# Patient Record
Sex: FEMALE | Race: WHITE | NOT HISPANIC OR LATINO | Employment: OTHER | URBAN - METROPOLITAN AREA
[De-identification: names, ages, dates, MRNs, and addresses within clinical notes are randomized per-mention and may not be internally consistent; named-entity substitution may affect disease eponyms.]

---

## 2017-09-06 ENCOUNTER — TELEPHONE (OUTPATIENT)
Dept: INTERNAL MEDICINE | Facility: CLINIC | Age: 82
End: 2017-09-06

## 2017-12-05 ENCOUNTER — APPOINTMENT (OUTPATIENT)
Dept: GENERAL RADIOLOGY | Facility: HOSPITAL | Age: 82
End: 2017-12-05

## 2017-12-05 ENCOUNTER — HOSPITAL ENCOUNTER (OUTPATIENT)
Facility: HOSPITAL | Age: 82
Setting detail: OBSERVATION
LOS: 1 days | Discharge: NURSING, REHAB OR LONG-TERM CARE | End: 2017-12-12
Attending: FAMILY MEDICINE | Admitting: ORTHOPAEDIC SURGERY

## 2017-12-05 DIAGNOSIS — Z74.09 IMPAIRED MOBILITY AND ACTIVITIES OF DAILY LIVING: ICD-10-CM

## 2017-12-05 DIAGNOSIS — S82.841A CLOSED BIMALLEOLAR FRACTURE OF RIGHT ANKLE, INITIAL ENCOUNTER: Primary | ICD-10-CM

## 2017-12-05 DIAGNOSIS — Z74.09 IMPAIRED PHYSICAL MOBILITY: ICD-10-CM

## 2017-12-05 DIAGNOSIS — Z78.9 IMPAIRED MOBILITY AND ACTIVITIES OF DAILY LIVING: ICD-10-CM

## 2017-12-05 PROBLEM — S82.851A: Status: ACTIVE | Noted: 2017-12-05

## 2017-12-05 PROBLEM — S82.843A ANKLE FRACTURE, BIMALLEOLAR, CLOSED: Status: ACTIVE | Noted: 2017-12-05

## 2017-12-05 PROCEDURE — 99219 PR INITIAL OBSERVATION CARE/DAY 50 MINUTES: CPT | Performed by: ORTHOPAEDIC SURGERY

## 2017-12-05 PROCEDURE — 96376 TX/PRO/DX INJ SAME DRUG ADON: CPT

## 2017-12-05 PROCEDURE — 99284 EMERGENCY DEPT VISIT MOD MDM: CPT

## 2017-12-05 PROCEDURE — 25010000002 FENTANYL CITRATE (PF) 100 MCG/2ML SOLUTION: Performed by: FAMILY MEDICINE

## 2017-12-05 PROCEDURE — 73610 X-RAY EXAM OF ANKLE: CPT

## 2017-12-05 PROCEDURE — 96374 THER/PROPH/DIAG INJ IV PUSH: CPT

## 2017-12-05 PROCEDURE — G0378 HOSPITAL OBSERVATION PER HR: HCPCS

## 2017-12-05 PROCEDURE — 25010000002 FENTANYL CITRATE (PF) 100 MCG/2ML SOLUTION

## 2017-12-05 RX ORDER — FENTANYL CITRATE 50 UG/ML
INJECTION, SOLUTION INTRAMUSCULAR; INTRAVENOUS
Status: DISCONTINUED
Start: 2017-12-05 | End: 2017-12-12 | Stop reason: HOSPADM

## 2017-12-05 RX ORDER — FENTANYL CITRATE 50 UG/ML
INJECTION, SOLUTION INTRAMUSCULAR; INTRAVENOUS
Status: DISCONTINUED
Start: 2017-12-05 | End: 2017-12-05 | Stop reason: WASHOUT

## 2017-12-05 RX ORDER — FENTANYL CITRATE 50 UG/ML
50 INJECTION, SOLUTION INTRAMUSCULAR; INTRAVENOUS ONCE
Status: COMPLETED | OUTPATIENT
Start: 2017-12-05 | End: 2017-12-05

## 2017-12-05 RX ORDER — FENTANYL CITRATE 50 UG/ML
INJECTION, SOLUTION INTRAMUSCULAR; INTRAVENOUS
Status: COMPLETED
Start: 2017-12-05 | End: 2017-12-05

## 2017-12-05 RX ORDER — SODIUM CHLORIDE 0.9 % (FLUSH) 0.9 %
10 SYRINGE (ML) INJECTION AS NEEDED
Status: DISCONTINUED | OUTPATIENT
Start: 2017-12-05 | End: 2017-12-12 | Stop reason: HOSPADM

## 2017-12-05 RX ADMIN — FENTANYL CITRATE 50 MCG: 50 INJECTION, SOLUTION INTRAMUSCULAR; INTRAVENOUS at 21:42

## 2017-12-05 RX ADMIN — FENTANYL CITRATE 50 MCG: 50 INJECTION, SOLUTION INTRAMUSCULAR; INTRAVENOUS at 23:12

## 2017-12-05 RX ADMIN — FENTANYL CITRATE 50 MCG: 50 INJECTION, SOLUTION INTRAMUSCULAR; INTRAVENOUS at 21:10

## 2017-12-05 RX ADMIN — Medication 10 ML: at 20:50

## 2017-12-06 ENCOUNTER — ANESTHESIA EVENT (OUTPATIENT)
Dept: PERIOP | Facility: HOSPITAL | Age: 82
End: 2017-12-06

## 2017-12-06 LAB — GLUCOSE BLDC GLUCOMTR-MCNC: 128 MG/DL (ref 70–130)

## 2017-12-06 PROCEDURE — G0378 HOSPITAL OBSERVATION PER HR: HCPCS

## 2017-12-06 PROCEDURE — 96376 TX/PRO/DX INJ SAME DRUG ADON: CPT

## 2017-12-06 PROCEDURE — 82962 GLUCOSE BLOOD TEST: CPT

## 2017-12-06 PROCEDURE — 25010000002 MORPHINE PER 10 MG: Performed by: ORTHOPAEDIC SURGERY

## 2017-12-06 PROCEDURE — 96375 TX/PRO/DX INJ NEW DRUG ADDON: CPT

## 2017-12-06 RX ORDER — SODIUM CHLORIDE AND POTASSIUM CHLORIDE 150; 900 MG/100ML; MG/100ML
75 INJECTION, SOLUTION INTRAVENOUS CONTINUOUS
Status: DISCONTINUED | OUTPATIENT
Start: 2017-12-07 | End: 2017-12-11

## 2017-12-06 RX ORDER — PROMETHAZINE HYDROCHLORIDE 25 MG/ML
12.5 INJECTION, SOLUTION INTRAMUSCULAR; INTRAVENOUS EVERY 6 HOURS PRN
Status: DISCONTINUED | OUTPATIENT
Start: 2017-12-06 | End: 2017-12-12 | Stop reason: HOSPADM

## 2017-12-06 RX ORDER — MORPHINE SULFATE 2 MG/ML
4 INJECTION, SOLUTION INTRAMUSCULAR; INTRAVENOUS EVERY 4 HOURS PRN
Status: DISCONTINUED | OUTPATIENT
Start: 2017-12-06 | End: 2017-12-06 | Stop reason: CLARIF

## 2017-12-06 RX ORDER — MORPHINE SULFATE 8 MG/ML
4 INJECTION INTRAMUSCULAR; INTRAVENOUS; SUBCUTANEOUS EVERY 4 HOURS PRN
Status: DISCONTINUED | OUTPATIENT
Start: 2017-12-06 | End: 2017-12-12 | Stop reason: HOSPADM

## 2017-12-06 RX ORDER — ZOLPIDEM TARTRATE 5 MG/1
10 TABLET ORAL NIGHTLY PRN
Status: DISCONTINUED | OUTPATIENT
Start: 2017-12-06 | End: 2017-12-12 | Stop reason: HOSPADM

## 2017-12-06 RX ADMIN — MORPHINE SULFATE 4 MG: 8 INJECTION, SOLUTION INTRAMUSCULAR; INTRAVENOUS at 04:55

## 2017-12-06 RX ADMIN — MORPHINE SULFATE 4 MG: 8 INJECTION, SOLUTION INTRAMUSCULAR; INTRAVENOUS at 10:44

## 2017-12-06 RX ADMIN — MORPHINE SULFATE 4 MG: 8 INJECTION, SOLUTION INTRAMUSCULAR; INTRAVENOUS at 21:23

## 2017-12-06 NOTE — PROGRESS NOTES
Pain controlled, eating lunch  Vitals:    12/06/17 1138   BP: 100/54   Pulse: 80   Resp: 18   Temp: 98 °F (36.7 °C)   SpO2: 94%     Alert  Splint in place  Moving toes well  Digits warm to touch  Bedrest  Surgery for ankle fracture soon.

## 2017-12-06 NOTE — H&P
Patient Care Team:  No Known Provider as PCP - General    Chief complaint   Chief Complaint   Patient presents with   • Ankle Injury   • Ankle Pain   • Fall       Subjective    Ankle Pain: Patient complains of right ankle pain.  Onset of the symptoms was today. Inciting event: injured while walk. Current symptoms include inability to bear weight and pain at the medial, lateral, dorsal and plantar aspect of the ankle.  Aggravating symptoms: any weight bearing, standing and walking. Patient's course of pain: began worsening 4 hr ago. Patient has had no prior ankle problems. Previous visits for this problem: none.  Evaluation to date: plain films: fracture.  Treatment to date: none.      Ankle Injury      Ankle Pain      Fall         Review of Systems   Constitutional: Negative.    HENT: Negative.    Eyes: Negative.    Respiratory: Negative.    Cardiovascular: Negative.    Gastrointestinal: Negative.    Endocrine: Negative.    Genitourinary: Negative.    Musculoskeletal: Positive for gait problem and joint swelling.   Skin: Negative.    Allergic/Immunologic: Negative.    Hematological: Negative.    Psychiatric/Behavioral: Negative.         History reviewed. No pertinent past medical history.  Past Surgical History:   Procedure Laterality Date   • HYSTERECTOMY       History reviewed. No pertinent family history.  Social History   Substance Use Topics   • Smoking status: Never Smoker   • Smokeless tobacco: Never Used   • Alcohol use No       (Not in a hospital admission)  Allergies:  Review of patient's allergies indicates no known allergies.    History reviewed. No pertinent family history.  Objective      Vital Signs  Temp:  [98.1 °F (36.7 °C)] 98.1 °F (36.7 °C)  Heart Rate:  [70-95] 70  Resp:  [18] 18  BP: (118-159)/(63-85) 118/63    Physical Exam      General Appearance:    Cooperative, elderly, frail, thin   Head:    Normocephalic, without obvious abnormality, atraumatic   Eyes:            Lids and lashes  normal, conjunctivae and sclerae normal, no   icterus, no pallor, corneas clear, PERRLA   Ears:    Ears appear intact with no abnormalities noted   Throat:   No oral lesions, no thrush, oral mucosa moist   Neck:   No adenopathy, supple, trachea midline, no thyromegaly, no     carotid bruit, no JVD   Back:     No kyphosis present, no scoliosis present, no skin lesions,       erythema or scars, no tenderness to percussion or                   palpation,   range of motion normal   Lungs:     Clear to auscultation,respirations regular, even and                   unlabored    Heart:    Regular rhythm and normal rate, normal S1 and S2, no            murmur, no gallop, no rub, no click   Breast Exam:    Deferred   Abdomen:     Normal bowel sounds, no masses, no organomegaly, soft        non-tender, non-distended, no guarding, no rebound                 tenderness   Genitalia:    Deferred   Extremities:   Moves all extremities well, no edema, no cyanosis, no              Redness, pain with palpation of the right ankle, + associated swelling.     Pulses:   Pulses palpable and equal bilaterally   Skin:   No bleeding, bruising or rash   Lymph nodes:   No palpable adenopathy   Neurologic:   Cranial nerves 2 - 12 grossly intact, sensation intact, DTR        present and equal bilaterally     Results Review:  Lab Results (last 24 hours)     ** No results found for the last 24 hours. **        Imaging Results (last 24 hours)     Procedure Component Value Units Date/Time    XR Ankle 3+ View Right [[id number]] Collected:  12/05/17 2053     Updated:  12/05/17 2116    Narrative:       Right ankle three view on 12/5/2017    CLINICAL INDICATION: Pain after fall    COMPARISON: None    FINDINGS: Ankle dislocation is noted with posterior and lateral  dislocation of the talus in relation to the distal tibial  plafond. There is an acute, oblique, displaced distal fibula  metadiaphysis fracture with lateral and posterior displacement  and  lateral and posterior angulation of the distal fracture  fragment. There is an acute, transverse, displaced fracture  through the base of the medial malleolus with lateral and likely  posterior displacement of the distal fracture fragment. No  definite posterior malleolar fracture is noted however would  recommend reimaging after relocation. Plantar calcaneal spur is  noted. No other fracture is noted.      Impression:       At least bimalleolar ankle fracture/dislocation as  above. Recommend reimaging after relocation.    Electronically signed by:  Anthony Cobos  12/5/2017 9:15 PM  CST Workstation: [id number]    XR Ankle 3+ View Right [[id number]] Collected:  12/05/17 2219     Updated:  12/05/17 2239    Narrative:       Right ankle three view on 12/5/2017    CLINICAL INDICATION: Post reduction    COMPARISON: 12/5/2017    FINDINGS: There remains posterior dislocation of the talus in  relation to the distal tibial plafond. Would still recommend  reimaging after successful relocation. Bimalleolar ankle  fractures are again noted with continued lateral and posterior  displacement of the distal fracture fragments.      Impression:       Unsuccessful reduction of the ankle dislocation with  continued posterior dislocation. Recommend reimaging after  relocation.    Electronically signed by:  Anthony Cobos  12/5/2017 10:38 PM  CST Workstation: [id number]      xray- dislocated trimalleolar ankle fracture.     I reviewed the patient's new clinical results.      Assessment/Plan     Principal Problem:    Ankle fracture, bimalleolar, closed      Assessment & Plan  Will need assistance before she can go home.  I discussed surgical treatment and reviewed options for treatment with her including risk of infection, non union, malunion, worsening pain, degenerative arthritic changes, talar avascular necrosis and revision surgery, possible amputation.  Medical risks of surgery include death, heart attack, arrhythmia,  heart failure, stroke, gastric ulceration perforation peritonitis deep vein thrombosis pulmonary embolus pneumonia pulmonary failure necessitating prolonged ventilation renal failure sepsis and multiorgan system failure.    All risks of surgery are discussed with her at length.    I discussed the patients findings and my recommendations with patient    aMrco A Ibrahim MD  12/05/17  11:01 PM    Time: More than 50% of time spent in counseling and coordination of care:  Total face-to-face/floor time 35 min.  Time spent in counseling 35 min. Counseling included the following topics: surgery

## 2017-12-06 NOTE — PLAN OF CARE
Problem: Patient Care Overview (Adult)  Goal: Plan of Care Review  Outcome: Ongoing (interventions implemented as appropriate)  Goal: Adult Individualization and Mutuality  Outcome: Ongoing (interventions implemented as appropriate)  Goal: Discharge Needs Assessment  Outcome: Ongoing (interventions implemented as appropriate)    Problem: Orthopaedic Fracture (Adult)  Goal: Signs and Symptoms of Listed Potential Problems Will be Absent or Manageable (Orthopaedic Fracture)  Outcome: Ongoing (interventions implemented as appropriate)

## 2017-12-06 NOTE — PLAN OF CARE
Problem: Patient Care Overview (Adult)  Goal: Plan of Care Review  Outcome: Ongoing (interventions implemented as appropriate)    12/06/17 0515   Coping/Psychosocial Response Interventions   Plan Of Care Reviewed With patient   Patient Care Overview   Progress no change       Goal: Adult Individualization and Mutuality  Outcome: Ongoing (interventions implemented as appropriate)  Goal: Discharge Needs Assessment  Outcome: Ongoing (interventions implemented as appropriate)    Problem: Orthopaedic Fracture (Adult)  Goal: Signs and Symptoms of Listed Potential Problems Will be Absent or Manageable (Orthopaedic Fracture)  Outcome: Ongoing (interventions implemented as appropriate)

## 2017-12-06 NOTE — ED PROVIDER NOTES
Subjective   Patient is a 82 y.o. female presenting with lower extremity pain.   Lower Extremity Issue   Location:  Ankle  Time since incident:  2 hours  Injury: yes    Mechanism of injury: fall    Fall:     Fall occurred:  Walking and tripped    Impact surface:  Dirt    Point of impact:  Unable to specify    Entrapped after fall: no    Ankle location:  R ankle  Pain details:     Quality:  Aching    Radiates to:  Does not radiate    Severity:  Severe    Onset quality:  Sudden    Timing:  Constant    Progression:  Unchanged  Chronicity:  New  Dislocation: yes    Foreign body present:  No foreign bodies  Prior injury to area:  Unable to specify  Relieved by:  Nothing  Worsened by:  Bearing weight  Ineffective treatments:  None tried  Associated symptoms: decreased ROM, numbness, swelling and tingling    Associated symptoms: no back pain, no fatigue, no fever, no itching, no muscle weakness, no neck pain and no stiffness    Risk factors: no concern for non-accidental trauma and no obesity        Review of Systems   Constitutional: Negative for appetite change, chills, diaphoresis, fatigue and fever.   HENT: Negative for congestion, ear discharge, ear pain, nosebleeds, rhinorrhea, sinus pressure, sore throat and trouble swallowing.    Eyes: Negative for discharge and redness.   Respiratory: Negative for apnea, cough, chest tightness, shortness of breath and wheezing.    Cardiovascular: Negative for chest pain.   Gastrointestinal: Negative for abdominal pain, diarrhea, nausea and vomiting.   Endocrine: Negative for polyuria.   Genitourinary: Negative for dysuria, frequency and urgency.   Musculoskeletal: Negative for back pain, myalgias, neck pain and stiffness.   Skin: Negative for color change, itching and rash.   Allergic/Immunologic: Negative for immunocompromised state.   Neurological: Negative for dizziness, seizures, syncope, weakness, light-headedness and headaches.   Hematological: Negative for adenopathy. Does  not bruise/bleed easily.   Psychiatric/Behavioral: Negative for behavioral problems and confusion.   All other systems reviewed and are negative.      History reviewed. No pertinent past medical history.    No Known Allergies    Past Surgical History:   Procedure Laterality Date   • HYSTERECTOMY         History reviewed. No pertinent family history.    Social History     Social History   • Marital status:      Spouse name: N/A   • Number of children: N/A   • Years of education: N/A     Social History Main Topics   • Smoking status: Never Smoker   • Smokeless tobacco: Never Used   • Alcohol use No   • Drug use: No   • Sexual activity: Defer     Other Topics Concern   • None     Social History Narrative   • None           Objective   Physical Exam   Constitutional: She is oriented to person, place, and time. She appears well-developed and well-nourished.   HENT:   Head: Normocephalic and atraumatic.   Nose: Nose normal.   Mouth/Throat: Oropharynx is clear and moist.   Eyes: Conjunctivae and EOM are normal. Pupils are equal, round, and reactive to light. Right eye exhibits no discharge. Left eye exhibits no discharge. No scleral icterus.   Neck: Normal range of motion. Neck supple. No tracheal deviation present.   Cardiovascular: Normal rate, regular rhythm and normal heart sounds.    No murmur heard.  Pulmonary/Chest: Effort normal and breath sounds normal. No stridor. No respiratory distress. She has no wheezes. She has no rales.   Abdominal: Soft. Bowel sounds are normal. She exhibits no distension and no mass. There is no tenderness. There is no rebound and no guarding.   Musculoskeletal: She exhibits edema and tenderness.        Right ankle: She exhibits decreased range of motion, swelling and deformity. Tenderness. Lateral malleolus and medial malleolus tenderness found.   Neurological: She is alert and oriented to person, place, and time. Coordination normal.   Skin: Skin is warm and dry. No rash noted.  No erythema.   Psychiatric: She has a normal mood and affect. Her behavior is normal. Thought content normal.   Nursing note and vitals reviewed.      Splint - Cast - Strapping  Date/Time: 12/5/2017 11:47 PM  Performed by: PASQUALE STEWART  Authorized by: PASQUALE STEWART     Consent:     Consent obtained:  Verbal  Pre-procedure details:     Sensation:  Normal  Procedure details:     Laterality:  Right    Location:  Ankle    Ankle:  R ankle    Splint type:  Ankle stirrup and long leg    Supplies:  Ortho-Glass  Post-procedure details:     Pain:  Improved    Sensation:  Normal    Patient tolerance of procedure:  Tolerated well, no immediate complications             ED Course  ED Course          Labs Reviewed - No data to display    XR Ankle 3+ View Right   Final Result   Unsuccessful reduction of the ankle dislocation with   continued posterior dislocation. Recommend reimaging after   relocation.      Electronically signed by:  Anthony Cobos  12/5/2017 10:38 PM   CST Workstation: [id number]      XR Ankle 3+ View Right   Final Result   At least bimalleolar ankle fracture/dislocation as   above. Recommend reimaging after relocation.      Electronically signed by:  Anthony Cobos  12/5/2017 9:15 PM   CST Workstation: [id number]      XR Ankle 3+ View Right    (Results Pending)                 MDM    Final diagnoses:   Closed bimalleolar fracture of right ankle, initial encounter            Pasquale Stewart MD  12/05/17 [id number]

## 2017-12-07 ENCOUNTER — ANESTHESIA (OUTPATIENT)
Dept: PERIOP | Facility: HOSPITAL | Age: 82
End: 2017-12-07

## 2017-12-07 ENCOUNTER — APPOINTMENT (OUTPATIENT)
Dept: GENERAL RADIOLOGY | Facility: HOSPITAL | Age: 82
End: 2017-12-07

## 2017-12-07 PROCEDURE — 25010000002 MIDAZOLAM PER 1 MG: Performed by: NURSE ANESTHETIST, CERTIFIED REGISTERED

## 2017-12-07 PROCEDURE — 25010000002 DEXAMETHASONE PER 1 MG: Performed by: NURSE ANESTHETIST, CERTIFIED REGISTERED

## 2017-12-07 PROCEDURE — 96361 HYDRATE IV INFUSION ADD-ON: CPT

## 2017-12-07 PROCEDURE — 94799 UNLISTED PULMONARY SVC/PX: CPT

## 2017-12-07 PROCEDURE — 73610 X-RAY EXAM OF ANKLE: CPT | Performed by: ORTHOPAEDIC SURGERY

## 2017-12-07 PROCEDURE — C1713 ANCHOR/SCREW BN/BN,TIS/BN: HCPCS | Performed by: ORTHOPAEDIC SURGERY

## 2017-12-07 PROCEDURE — G0378 HOSPITAL OBSERVATION PER HR: HCPCS

## 2017-12-07 PROCEDURE — 25010000002 PROPOFOL 10 MG/ML EMULSION: Performed by: NURSE ANESTHETIST, CERTIFIED REGISTERED

## 2017-12-07 PROCEDURE — 96376 TX/PRO/DX INJ SAME DRUG ADON: CPT

## 2017-12-07 PROCEDURE — 25010000002 ONDANSETRON PER 1 MG: Performed by: NURSE ANESTHETIST, CERTIFIED REGISTERED

## 2017-12-07 PROCEDURE — 25810000003 SODIUM CHLORIDE 0.9 % WITH KCL 20 MEQ 20-0.9 MEQ/L-% SOLUTION: Performed by: ORTHOPAEDIC SURGERY

## 2017-12-07 PROCEDURE — 76000 FLUOROSCOPY <1 HR PHYS/QHP: CPT

## 2017-12-07 PROCEDURE — 25010000003 CEFAZOLIN PER 500 MG: Performed by: ORTHOPAEDIC SURGERY

## 2017-12-07 PROCEDURE — 25010000002 MORPHINE PER 10 MG: Performed by: ORTHOPAEDIC SURGERY

## 2017-12-07 PROCEDURE — 27823 TREATMENT OF ANKLE FRACTURE: CPT | Performed by: ORTHOPAEDIC SURGERY

## 2017-12-07 PROCEDURE — 25010000002 FENTANYL CITRATE (PF) 100 MCG/2ML SOLUTION: Performed by: NURSE ANESTHETIST, CERTIFIED REGISTERED

## 2017-12-07 DEVICE — SCRW CANN LNG THRD 1/2 4X50MM: Type: IMPLANTABLE DEVICE | Site: ANKLE | Status: FUNCTIONAL

## 2017-12-07 DEVICE — PLT TBG 1/3 LCP W COL 6HL 69MM: Type: IMPLANTABLE DEVICE | Site: ANKLE | Status: FUNCTIONAL

## 2017-12-07 DEVICE — IMPLANTABLE DEVICE: Type: IMPLANTABLE DEVICE | Site: ANKLE | Status: FUNCTIONAL

## 2017-12-07 DEVICE — SCRW CORT S/TAP 3.5X14MM: Type: IMPLANTABLE DEVICE | Site: ANKLE | Status: FUNCTIONAL

## 2017-12-07 RX ORDER — DEXAMETHASONE SODIUM PHOSPHATE 4 MG/ML
INJECTION, SOLUTION INTRA-ARTICULAR; INTRALESIONAL; INTRAMUSCULAR; INTRAVENOUS; SOFT TISSUE AS NEEDED
Status: DISCONTINUED | OUTPATIENT
Start: 2017-12-07 | End: 2017-12-07 | Stop reason: SURG

## 2017-12-07 RX ORDER — ACETAMINOPHEN 325 MG/1
650 TABLET ORAL ONCE AS NEEDED
Status: DISCONTINUED | OUTPATIENT
Start: 2017-12-07 | End: 2017-12-07 | Stop reason: HOSPADM

## 2017-12-07 RX ORDER — BACTERIOSTATIC SODIUM CHLORIDE 0.9 %
VIAL (ML) INJECTION AS NEEDED
Status: DISCONTINUED | OUTPATIENT
Start: 2017-12-07 | End: 2017-12-12 | Stop reason: HOSPADM

## 2017-12-07 RX ORDER — CEFAZOLIN SODIUM 1 G/3ML
INJECTION, POWDER, FOR SOLUTION INTRAMUSCULAR; INTRAVENOUS AS NEEDED
Status: DISCONTINUED | OUTPATIENT
Start: 2017-12-07 | End: 2017-12-07

## 2017-12-07 RX ORDER — SODIUM CHLORIDE, SODIUM GLUCONATE, SODIUM ACETATE, POTASSIUM CHLORIDE AND MAGNESIUM CHLORIDE 526; 502; 368; 37; 30 MG/100ML; MG/100ML; MG/100ML; MG/100ML; MG/100ML
INJECTION, SOLUTION INTRAVENOUS CONTINUOUS
Status: DISCONTINUED | OUTPATIENT
Start: 2017-12-07 | End: 2017-12-11

## 2017-12-07 RX ORDER — SODIUM CHLORIDE, SODIUM GLUCONATE, SODIUM ACETATE, POTASSIUM CHLORIDE, AND MAGNESIUM CHLORIDE 526; 502; 368; 37; 30 MG/100ML; MG/100ML; MG/100ML; MG/100ML; MG/100ML
INJECTION, SOLUTION INTRAVENOUS CONTINUOUS PRN
Status: DISCONTINUED | OUTPATIENT
Start: 2017-12-07 | End: 2017-12-07 | Stop reason: SURG

## 2017-12-07 RX ORDER — MORPHINE SULFATE 8 MG/ML
4 INJECTION INTRAMUSCULAR; INTRAVENOUS; SUBCUTANEOUS EVERY 4 HOURS PRN
Status: DISCONTINUED | OUTPATIENT
Start: 2017-12-07 | End: 2017-12-12 | Stop reason: HOSPADM

## 2017-12-07 RX ORDER — DOCUSATE SODIUM 100 MG/1
100 CAPSULE, LIQUID FILLED ORAL 2 TIMES DAILY
Status: DISCONTINUED | OUTPATIENT
Start: 2017-12-07 | End: 2017-12-12 | Stop reason: HOSPADM

## 2017-12-07 RX ORDER — NALOXONE HCL 0.4 MG/ML
0.2 VIAL (ML) INJECTION AS NEEDED
Status: DISCONTINUED | OUTPATIENT
Start: 2017-12-07 | End: 2017-12-07 | Stop reason: HOSPADM

## 2017-12-07 RX ORDER — SODIUM CHLORIDE, SODIUM GLUCONATE, SODIUM ACETATE, POTASSIUM CHLORIDE AND MAGNESIUM CHLORIDE 526; 502; 368; 37; 30 MG/100ML; MG/100ML; MG/100ML; MG/100ML; MG/100ML
1000 INJECTION, SOLUTION INTRAVENOUS CONTINUOUS
Status: DISCONTINUED | OUTPATIENT
Start: 2017-12-07 | End: 2017-12-11

## 2017-12-07 RX ORDER — MIDAZOLAM HYDROCHLORIDE 1 MG/ML
INJECTION INTRAMUSCULAR; INTRAVENOUS AS NEEDED
Status: DISCONTINUED | OUTPATIENT
Start: 2017-12-07 | End: 2017-12-07 | Stop reason: SURG

## 2017-12-07 RX ORDER — BACITRACIN 50000 [IU]/1
INJECTION, POWDER, FOR SOLUTION INTRAMUSCULAR AS NEEDED
Status: DISCONTINUED | OUTPATIENT
Start: 2017-12-07 | End: 2017-12-12 | Stop reason: HOSPADM

## 2017-12-07 RX ORDER — HYDROMORPHONE HCL 110MG/55ML
0.5 PATIENT CONTROLLED ANALGESIA SYRINGE INTRAVENOUS
Status: DISCONTINUED | OUTPATIENT
Start: 2017-12-07 | End: 2017-12-07 | Stop reason: HOSPADM

## 2017-12-07 RX ORDER — ACETAMINOPHEN 500 MG
500 TABLET ORAL EVERY 6 HOURS PRN
Status: DISCONTINUED | OUTPATIENT
Start: 2017-12-07 | End: 2017-12-12 | Stop reason: HOSPADM

## 2017-12-07 RX ORDER — SODIUM CHLORIDE AND POTASSIUM CHLORIDE 150; 900 MG/100ML; MG/100ML
100 INJECTION, SOLUTION INTRAVENOUS CONTINUOUS
Status: DISCONTINUED | OUTPATIENT
Start: 2017-12-07 | End: 2017-12-11

## 2017-12-07 RX ORDER — PROPOFOL 10 MG/ML
VIAL (ML) INTRAVENOUS AS NEEDED
Status: DISCONTINUED | OUTPATIENT
Start: 2017-12-07 | End: 2017-12-07 | Stop reason: SURG

## 2017-12-07 RX ORDER — ACETAMINOPHEN 650 MG/1
650 SUPPOSITORY RECTAL ONCE AS NEEDED
Status: DISCONTINUED | OUTPATIENT
Start: 2017-12-07 | End: 2017-12-07 | Stop reason: HOSPADM

## 2017-12-07 RX ORDER — FENTANYL CITRATE 50 UG/ML
INJECTION, SOLUTION INTRAMUSCULAR; INTRAVENOUS AS NEEDED
Status: DISCONTINUED | OUTPATIENT
Start: 2017-12-07 | End: 2017-12-07 | Stop reason: SURG

## 2017-12-07 RX ORDER — DIPHENHYDRAMINE HYDROCHLORIDE 50 MG/ML
12.5 INJECTION INTRAMUSCULAR; INTRAVENOUS
Status: DISCONTINUED | OUTPATIENT
Start: 2017-12-07 | End: 2017-12-07 | Stop reason: HOSPADM

## 2017-12-07 RX ORDER — ONDANSETRON 2 MG/ML
4 INJECTION INTRAMUSCULAR; INTRAVENOUS ONCE AS NEEDED
Status: DISCONTINUED | OUTPATIENT
Start: 2017-12-07 | End: 2017-12-07 | Stop reason: HOSPADM

## 2017-12-07 RX ORDER — EPHEDRINE SULFATE 50 MG/ML
5 INJECTION, SOLUTION INTRAVENOUS ONCE AS NEEDED
Status: DISCONTINUED | OUTPATIENT
Start: 2017-12-07 | End: 2017-12-07 | Stop reason: HOSPADM

## 2017-12-07 RX ORDER — LABETALOL HYDROCHLORIDE 5 MG/ML
5 INJECTION, SOLUTION INTRAVENOUS
Status: DISCONTINUED | OUTPATIENT
Start: 2017-12-07 | End: 2017-12-07 | Stop reason: HOSPADM

## 2017-12-07 RX ORDER — FLUMAZENIL 0.1 MG/ML
0.2 INJECTION INTRAVENOUS AS NEEDED
Status: DISCONTINUED | OUTPATIENT
Start: 2017-12-07 | End: 2017-12-07 | Stop reason: HOSPADM

## 2017-12-07 RX ORDER — ONDANSETRON 2 MG/ML
INJECTION INTRAMUSCULAR; INTRAVENOUS AS NEEDED
Status: DISCONTINUED | OUTPATIENT
Start: 2017-12-07 | End: 2017-12-07 | Stop reason: SURG

## 2017-12-07 RX ORDER — LIDOCAINE HYDROCHLORIDE 20 MG/ML
INJECTION, SOLUTION INFILTRATION; PERINEURAL AS NEEDED
Status: DISCONTINUED | OUTPATIENT
Start: 2017-12-07 | End: 2017-12-07 | Stop reason: SURG

## 2017-12-07 RX ORDER — PROMETHAZINE HYDROCHLORIDE 25 MG/ML
12.5 INJECTION, SOLUTION INTRAMUSCULAR; INTRAVENOUS EVERY 6 HOURS PRN
Status: DISCONTINUED | OUTPATIENT
Start: 2017-12-07 | End: 2017-12-12 | Stop reason: HOSPADM

## 2017-12-07 RX ADMIN — ONDANSETRON 4 MG: 2 INJECTION INTRAMUSCULAR; INTRAVENOUS at 11:46

## 2017-12-07 RX ADMIN — POTASSIUM CHLORIDE AND SODIUM CHLORIDE 75 ML/HR: 900; 150 INJECTION, SOLUTION INTRAVENOUS at 07:09

## 2017-12-07 RX ADMIN — SODIUM CHLORIDE, SODIUM GLUCONATE, SODIUM ACETATE, POTASSIUM CHLORIDE AND MAGNESIUM CHLORIDE 250 ML: 526; 502; 368; 37; 30 INJECTION, SOLUTION INTRAVENOUS at 11:15

## 2017-12-07 RX ADMIN — SODIUM CHLORIDE, SODIUM GLUCONATE, SODIUM ACETATE, POTASSIUM CHLORIDE, AND MAGNESIUM CHLORIDE: 526; 502; 368; 37; 30 INJECTION, SOLUTION INTRAVENOUS at 11:39

## 2017-12-07 RX ADMIN — DEXAMETHASONE SODIUM PHOSPHATE 4 MG: 4 INJECTION, SOLUTION INTRAMUSCULAR; INTRAVENOUS at 10:49

## 2017-12-07 RX ADMIN — SODIUM CHLORIDE, SODIUM GLUCONATE, SODIUM ACETATE, POTASSIUM CHLORIDE AND MAGNESIUM CHLORIDE 100 ML: 526; 502; 368; 37; 30 INJECTION, SOLUTION INTRAVENOUS at 10:31

## 2017-12-07 RX ADMIN — SODIUM CHLORIDE, SODIUM GLUCONATE, SODIUM ACETATE, POTASSIUM CHLORIDE AND MAGNESIUM CHLORIDE 300 ML: 526; 502; 368; 37; 30 INJECTION, SOLUTION INTRAVENOUS at 10:55

## 2017-12-07 RX ADMIN — FENTANYL CITRATE 25 MCG: 50 INJECTION, SOLUTION INTRAMUSCULAR; INTRAVENOUS at 11:25

## 2017-12-07 RX ADMIN — PROPOFOL 70 MG: 10 INJECTION, EMULSION INTRAVENOUS at 10:37

## 2017-12-07 RX ADMIN — SODIUM CHLORIDE, SODIUM GLUCONATE, SODIUM ACETATE, POTASSIUM CHLORIDE AND MAGNESIUM CHLORIDE 1000 ML: 526; 502; 368; 37; 30 INJECTION, SOLUTION INTRAVENOUS at 09:58

## 2017-12-07 RX ADMIN — MORPHINE SULFATE 4 MG: 8 INJECTION, SOLUTION INTRAMUSCULAR; INTRAVENOUS at 05:44

## 2017-12-07 RX ADMIN — SODIUM CHLORIDE, SODIUM GLUCONATE, SODIUM ACETATE, POTASSIUM CHLORIDE AND MAGNESIUM CHLORIDE 300 ML: 526; 502; 368; 37; 30 INJECTION, SOLUTION INTRAVENOUS at 11:35

## 2017-12-07 RX ADMIN — POTASSIUM CHLORIDE AND SODIUM CHLORIDE 75 ML/HR: 900; 150 INJECTION, SOLUTION INTRAVENOUS at 01:18

## 2017-12-07 RX ADMIN — WATER 2 G: 1 INJECTION INTRAMUSCULAR; INTRAVENOUS; SUBCUTANEOUS at 19:36

## 2017-12-07 RX ADMIN — FENTANYL CITRATE 50 MCG: 50 INJECTION, SOLUTION INTRAMUSCULAR; INTRAVENOUS at 10:33

## 2017-12-07 RX ADMIN — MORPHINE SULFATE 4 MG: 8 INJECTION, SOLUTION INTRAMUSCULAR; INTRAVENOUS at 13:10

## 2017-12-07 RX ADMIN — MIDAZOLAM 1 MG: 1 INJECTION INTRAMUSCULAR; INTRAVENOUS at 10:33

## 2017-12-07 RX ADMIN — FENTANYL CITRATE 25 MCG: 50 INJECTION, SOLUTION INTRAMUSCULAR; INTRAVENOUS at 11:14

## 2017-12-07 RX ADMIN — FENTANYL CITRATE 25 MCG: 50 INJECTION, SOLUTION INTRAMUSCULAR; INTRAVENOUS at 10:49

## 2017-12-07 RX ADMIN — WATER 2 G: 1 INJECTION INTRAMUSCULAR; INTRAVENOUS; SUBCUTANEOUS at 10:45

## 2017-12-07 RX ADMIN — POTASSIUM CHLORIDE AND SODIUM CHLORIDE 100 ML/HR: 900; 150 INJECTION, SOLUTION INTRAVENOUS at 21:01

## 2017-12-07 RX ADMIN — ACETAMINOPHEN 500 MG: 500 TABLET ORAL at 14:37

## 2017-12-07 RX ADMIN — PROPOFOL 30 MG: 10 INJECTION, EMULSION INTRAVENOUS at 10:38

## 2017-12-07 RX ADMIN — DOCUSATE SODIUM 100 MG: 100 CAPSULE, LIQUID FILLED ORAL at 17:07

## 2017-12-07 RX ADMIN — LIDOCAINE HYDROCHLORIDE 60 MG: 20 INJECTION, SOLUTION INFILTRATION; PERINEURAL at 10:37

## 2017-12-07 RX ADMIN — SODIUM CHLORIDE, SODIUM GLUCONATE, SODIUM ACETATE, POTASSIUM CHLORIDE AND MAGNESIUM CHLORIDE 50 ML: 526; 502; 368; 37; 30 INJECTION, SOLUTION INTRAVENOUS at 11:37

## 2017-12-07 NOTE — BRIEF OP NOTE
ANKLE OPEN REDUCTION INTERNAL FIXATION  Progress Note    Rebecca Zaragoza  12/5/2017 - 12/7/2017    Pre-op Diagnosis:   Closed bimalleolar fracture of right ankle, initial encounter [S82.841A]       Post-Op Diagnosis Codes:     * Closed bimalleolar fracture of right ankle, initial encounter [S82.841A]    Procedure/CPT® Codes:  WI OPEN TX TRIMALLEOLAR ANKLE FX W FIX PST LIP [14677]  WI APPLY LOWER LEG SPLINT [42197]  CHG X-RAY ANKLE 3+ VW [28971]    Procedure(s):  OPEN REDUCTION INTERNAL FIXATION RIGHT ANKLE       (C-ARM#3)    Surgeon(s):  Marco A Ibrahim MD    Anesthesia: General    Staff:   Circulator: Nawaf Torres RN  Radiology Technologist: Hernando Almonte  Scrub Person: Paige Case  Assistant: Phylicia Barber CSA    Estimated Blood Loss: minimal    Urine Voided: * No values recorded between 12/7/2017 10:31 AM and 12/7/2017 11:58 AM *    Specimens:                None      Drains:           Findings: trimalleolar ankle fracture    Complications: none      Marco A Ibrahim MD     Date: 12/7/2017  Time: 12:09 PM

## 2017-12-07 NOTE — OP NOTE
Diagnosis right trimalleolar ankle fracture dislocation, unstable  Diagnosis right trimalleolar ankle fracture dislocation, unstable  Surgery  1) Open reduction internal fixation right unstable trimalleolar ankle fracture, with internal fixation  2) Placement of short leg splint right leg  3) Xray evaluation right ankle for open reduction internal fixation   Anesthesia general  Surgeon Shanika Kamara  Blood loss 20  ND is brought to the operating room and placed under general anesthetic.  She is positioned, the right thigh tourniquet is placed.  Consent and IV antibiotics are reviewed.  Right leg splint is removed.  The ankle is washed with chloroprep and sterile surgical barriers are placed.  Sterile surgical technique is performed for this procedure.  The tourniquet is inflated, I placed an incision of the right ankle, medial malleolus, 5cm.  I dissected upon the bone, the fracture of the medial malleolus is identified.  I irritated the fracture, irrigated the ankle joint and debrided periosteal tissue from the fracture site.  There was not a large medial malleolar piece, and there was some rubble with smaller fracture pieces.  A large cartilage flap was present on the medial talar dome and this was removed.  I reduced the medial malleolus as perfectly as could be achieved.  Two faith wires are placed.  I reamed and placed two cancellous screws, 50mm length, with good fixation.  I was able to observe the posterior malleolar fracture from the medial incision.  I placed a #2 vicryl through the posterior malleolar fracture piece, and sutured this to the tibial metaphysis and periosteum, providing some fixation of the posterior tibial piece.  The medial malleolus is now stable.  I irrigated the wound, closed with vicryl followed by nylon.  Incision of the lateral side is 8cm, I exposed the fibula, where the fracture is identified.  I irrigated the fracture site and removed the hematoma.  I reduced the  fracture, placed a 6hole plate, screws for fixation.  The ankle joint is well aligned, stable fixation through passive range of motion.  xrays of the ankle show the ankle is aligned, fixation in appropriate position.  All fixation is stable, ankle alignment is stable.  I irrigated laterally, closed with vicryl and nylon.  Sterile dressings are placed.  Cast padding is placed.  A short leg splint is placed.  The patient is taken to PACU.  No complications.

## 2017-12-07 NOTE — ANESTHESIA PROCEDURE NOTES
Airway  Urgency: elective    Airway not difficult    General Information and Staff    Patient location during procedure: OR  CRNA: FABRICE LEE    Indications and Patient Condition  Indications for airway management: airway protection    Preoxygenated: yes  Mask difficulty assessment: 0 - not attempted    Final Airway Details  Final airway type: supraglottic airway      Successful airway: classic  Size 4    Number of attempts at approach: 1

## 2017-12-07 NOTE — PLAN OF CARE
Problem: Patient Care Overview (Adult)  Goal: Plan of Care Review  Outcome: Ongoing (interventions implemented as appropriate)    12/07/17 1222   Coping/Psychosocial Response Interventions   Plan Of Care Reviewed With patient   Patient Care Overview   Progress improving   Outcome Evaluation   Outcome Summary/Follow up Plan vss, dc per anesthesia protocol, transfer to .       Goal: Discharge Needs Assessment  Outcome: Ongoing (interventions implemented as appropriate)

## 2017-12-07 NOTE — ANESTHESIA PREPROCEDURE EVALUATION
Anesthesia Evaluation     no history of anesthetic complications:  NPO Solid Status: > 8 hours  NPO Liquid Status: > 8 hours     Airway   Mallampati: II  TM distance: <3 FB  Neck ROM: limited  possible difficult intubation  Dental    (+) lower dentures and upper dentures    Pulmonary - normal exam    breath sounds clear to auscultation  (-) not a smoker    ROS comment: Sinus congestion  Cardiovascular   Exercise tolerance: good (4-7 METS)    Rhythm: regular  Rate: normal    (-) angina, murmur      Neuro/Psych  (+) TIA,    GI/Hepatic/Renal/Endo - negative ROS     Musculoskeletal     (+) back pain,       ROS comment: Right ankle fracture  Fell at home covering up her flowers for cold nights    Improved alignment of ankle dislocation with some  continued mild posterior subluxation/partial dislocation. There  has been improved alignment of apparent trimalleolar ankle  fracture  Abdominal  - normal exam   Substance History - negative use     OB/GYN          Other   (+) arthritis                                   Anesthesia Plan    ASA 3     general     intravenous induction   Anesthetic plan and risks discussed with patient.

## 2017-12-07 NOTE — PLAN OF CARE
Problem: Patient Care Overview (Adult)  Goal: Plan of Care Review  Outcome: Ongoing (interventions implemented as appropriate)    12/07/17 0425   Coping/Psychosocial Response Interventions   Plan Of Care Reviewed With patient   Patient Care Overview   Progress no change   Outcome Evaluation   Outcome Summary/Follow up Plan pt resting well throughout the night, states she only wants to take pain medication when absolutely necessary       Goal: Adult Individualization and Mutuality  Outcome: Ongoing (interventions implemented as appropriate)  Goal: Discharge Needs Assessment  Outcome: Ongoing (interventions implemented as appropriate)    Problem: Orthopaedic Fracture (Adult)  Goal: Signs and Symptoms of Listed Potential Problems Will be Absent or Manageable (Orthopaedic Fracture)  Outcome: Ongoing (interventions implemented as appropriate)

## 2017-12-07 NOTE — ANESTHESIA POSTPROCEDURE EVALUATION
Patient: Rebecca Zaragoza    Procedure Summary     Date Anesthesia Start Anesthesia Stop Room / Location    12/07/17 1031 1206 [facility] OR 11 / [facility]       Procedure Diagnosis Surgeon Provider    OPEN REDUCTION INTERNAL FIXATION RIGHT ANKLE       (C-ARM#3) (Right Ankle) Closed bimalleolar fracture of right ankle, initial encounter  (Closed bimalleolar fracture of right ankle, initial encounter [S82.651H]) MD Yair Jaeger MD          Anesthesia Type: general  Last vitals  BP   109/53 (12/07/17 0933)   Temp   (!) 100.8 °F (38.2 °C) (12/07/17 0933)   Pulse   73 (12/07/17 0933)   Resp   20 (12/07/17 0933)     SpO2   93 % (on ear, 89-91 on finger) (12/07/17 0933)     Post Anesthesia Care and Evaluation    Patient location during evaluation: PACU  Patient participation: complete - patient participated  Level of consciousness: sleepy but conscious  Pain management: adequate  Airway patency: patent  Anesthetic complications: No anesthetic complications  PONV Status: none  Cardiovascular status: acceptable  Respiratory status: acceptable  Hydration status: acceptable

## 2017-12-08 PROCEDURE — G0378 HOSPITAL OBSERVATION PER HR: HCPCS

## 2017-12-08 PROCEDURE — 25010000003 CEFAZOLIN PER 500 MG: Performed by: ORTHOPAEDIC SURGERY

## 2017-12-08 PROCEDURE — G8987 SELF CARE CURRENT STATUS: HCPCS

## 2017-12-08 PROCEDURE — 94760 N-INVAS EAR/PLS OXIMETRY 1: CPT

## 2017-12-08 PROCEDURE — 25810000003 SODIUM CHLORIDE 0.9 % WITH KCL 20 MEQ 20-0.9 MEQ/L-% SOLUTION: Performed by: ORTHOPAEDIC SURGERY

## 2017-12-08 PROCEDURE — G8978 MOBILITY CURRENT STATUS: HCPCS | Performed by: PHYSICAL THERAPIST

## 2017-12-08 PROCEDURE — 97116 GAIT TRAINING THERAPY: CPT

## 2017-12-08 PROCEDURE — G8979 MOBILITY GOAL STATUS: HCPCS | Performed by: PHYSICAL THERAPIST

## 2017-12-08 PROCEDURE — G8988 SELF CARE GOAL STATUS: HCPCS

## 2017-12-08 PROCEDURE — 94799 UNLISTED PULMONARY SVC/PX: CPT

## 2017-12-08 PROCEDURE — 97530 THERAPEUTIC ACTIVITIES: CPT | Performed by: PHYSICAL THERAPIST

## 2017-12-08 PROCEDURE — 97166 OT EVAL MOD COMPLEX 45 MIN: CPT

## 2017-12-08 PROCEDURE — 97530 THERAPEUTIC ACTIVITIES: CPT

## 2017-12-08 PROCEDURE — 25010000002 MORPHINE PER 10 MG: Performed by: ORTHOPAEDIC SURGERY

## 2017-12-08 PROCEDURE — 97162 PT EVAL MOD COMPLEX 30 MIN: CPT | Performed by: PHYSICAL THERAPIST

## 2017-12-08 RX ADMIN — Medication 10 ML: at 16:39

## 2017-12-08 RX ADMIN — MORPHINE SULFATE 4 MG: 8 INJECTION, SOLUTION INTRAMUSCULAR; INTRAVENOUS at 21:18

## 2017-12-08 RX ADMIN — ACETAMINOPHEN 500 MG: 500 TABLET ORAL at 01:13

## 2017-12-08 RX ADMIN — WATER 2 G: 1 INJECTION INTRAMUSCULAR; INTRAVENOUS; SUBCUTANEOUS at 05:36

## 2017-12-08 RX ADMIN — MORPHINE SULFATE 4 MG: 8 INJECTION, SOLUTION INTRAMUSCULAR; INTRAVENOUS at 16:39

## 2017-12-08 RX ADMIN — POTASSIUM CHLORIDE AND SODIUM CHLORIDE 100 ML/HR: 900; 150 INJECTION, SOLUTION INTRAVENOUS at 06:58

## 2017-12-08 RX ADMIN — DOCUSATE SODIUM 100 MG: 100 CAPSULE, LIQUID FILLED ORAL at 17:12

## 2017-12-08 RX ADMIN — MORPHINE SULFATE 4 MG: 8 INJECTION, SOLUTION INTRAMUSCULAR; INTRAVENOUS at 09:15

## 2017-12-08 RX ADMIN — DOCUSATE SODIUM 100 MG: 100 CAPSULE, LIQUID FILLED ORAL at 08:59

## 2017-12-08 NOTE — PROGRESS NOTES
Pain controlled  Vitals:    12/08/17 0413   BP: 109/59   Pulse: 67   Resp: 18   Temp: 98 °F (36.7 °C)   SpO2: 94%     Alert  Splint in place  Moving toes well  Brisk capillary refill  Sensation intact to touch  Physical therapy/OT  No weight bearing of the injured leg  -nursing facility transfer

## 2017-12-08 NOTE — PLAN OF CARE
Problem: Patient Care Overview (Adult)  Goal: Plan of Care Review  Outcome: Ongoing (interventions implemented as appropriate)    12/08/17 1514   Coping/Psychosocial Response Interventions   Plan Of Care Reviewed With patient   Patient Care Overview   Progress improving   Outcome Evaluation   Outcome Summary/Follow up Plan Pt t/f sup-sut with CGA, c/o increased pain upon EOB. sit-stand CGA. w/ RW. Pt ambulated ~ 68' with RW and CGA- MIn A . Constant cues needed and assistance given to manage AD. Pt having trouble with sequencing and maintaining NWB on RLE. Pt would benifit from continued skilled PT. SNF recommended at D/C for further rehab prior to discharge home. No goals met this pm.          Problem: Inpatient Physical Therapy  Goal: Bed Mobility Goal STG- PT  Outcome: Ongoing (interventions implemented as appropriate)    12/08/17 1027 12/08/17 1514   Bed Mobility PT STG   Bed Mobility PT STG, Time to Achieve 2 days --    Bed Mobility PT STG, Activity Type supine to sit/sit to supine --    Bed Mobility PT STG, [place] Level independent --    Bed Mobility PT STG, Additional Goal HOB down and no rails --    Bed Mobility PT STG, Date Goal Reviewed --  12/08/17   Bed Mobility PT STG, Outcome --  goal ongoing       Goal: Transfer Training Goal 1 STG- PT  Outcome: Ongoing (interventions implemented as appropriate)    12/08/17 1027 12/08/17 1514   Transfer Training PT STG   Transfer Training PT STG, Date Established 12/08/17 --    Transfer Training PT STG, Time to Achieve 2 days --    Transfer Training PT STG, Activity Type bed to chair /chair to bed;sit to stand/stand to sit --    Transfer Training PT STG, [place] Level supervision required --    Transfer Training PT STG, Assist Device walker, rolling --    Transfer Training PT STG, Additional Goal maintain NWB status --    Transfer Training PT STG, Date Goal Reviewed --  12/08/17   Transfer Training PT STG, Outcome --  goal ongoing       Goal: Gait Training  Goal LTG- PT  Outcome: Ongoing (interventions implemented as appropriate)    12/08/17 1027 12/08/17 1514   Gait Training PT LTG   Gait Training Goal PT LTG, Date Established 12/08/17 --    Gait Training Goal PT LTG, Time to Achieve 2 days --    Gait Training Goal PT LTG, [place] Level contact guard assist --    Gait Training Goal PT LTG, Assist Device walker, rolling --    Gait Training Goal PT LTG, Distance to Achieve 50 --    Gait Training Goal PT LTG, Additional Goal NWB RLE --    Gait Training Goal PT LTG, Date Goal Reviewed --  12/08/17   Gait Training Goal PT LTG, Outcome --  goal ongoing       Goal: Stair Training Goal LTG- PT  Outcome: Ongoing (interventions implemented as appropriate)    12/08/17 1027 12/08/17 1514   Stair Training PT LTG   Stair Training Goal PT LTG, Date Established 12/08/17 --    Stair Training Goal PT LTG, Time to Achieve by discharge --    Stair Training Goal PT LTG, Number of Steps 3 --    Stair Training Goal PT LTG, [place] Level minimum assist (75% patient effort) --    Stair Training Goal PT LTG, Assist Device 1 handrail --    Stair Training Goal PT LTG, Date Goal Reviewed --  12/08/17   Stair Training Goal PT LTG, Outcome --  goal ongoing

## 2017-12-08 NOTE — PLAN OF CARE
Problem: Patient Care Overview (Adult)  Goal: Plan of Care Review  Outcome: Ongoing (interventions implemented as appropriate)    12/08/17 1849   Coping/Psychosocial Response Interventions   Plan Of Care Reviewed With patient   Patient Care Overview   Progress improving   Outcome Evaluation   Outcome Summary/Follow up Plan VSS; patient working with therapy, requiring assistance with ADL's; pain controlled with medication;       Goal: Adult Individualization and Mutuality  Outcome: Ongoing (interventions implemented as appropriate)  Goal: Discharge Needs Assessment  Outcome: Ongoing (interventions implemented as appropriate)    Problem: Orthopaedic Fracture (Adult)  Goal: Signs and Symptoms of Listed Potential Problems Will be Absent or Manageable (Orthopaedic Fracture)  Outcome: Ongoing (interventions implemented as appropriate)

## 2017-12-08 NOTE — PLAN OF CARE
Problem: Patient Care Overview (Adult)  Goal: Plan of Care Review  Outcome: Ongoing (interventions implemented as appropriate)    12/08/17 1151   Coping/Psychosocial Response Interventions   Plan Of Care Reviewed With patient   Outcome Evaluation   Outcome Summary/Follow up Plan OT eval complete on this date. Pt required CGA for sit to stand and min A for transfers and short amb. Pt could benefit from OT services to increase independence with ADLs and functional mobility/transfers.       Goal: Discharge Needs Assessment    12/08/17 1151   Discharge Needs Assessment   Equipment Needed After Discharge grab bar;wheelchair;walker, rolling;raised toilet   Discharge Facility/Level Of Care Needs acute rehab;nursing facility, skilled         Problem: Inpatient Occupational Therapy  Goal: Transfer Training Goal 1 LTG- OT  Outcome: Ongoing (interventions implemented as appropriate)    12/08/17 1151   Transfer Training OT LTG   Transfer Training OT LTG, Date Established 12/08/17   Transfer Training OT LTG, Time to Achieve by discharge   Transfer Training OT LTG, Activity Type all transfers   Transfer Training OT LTG, [place] Level supervision required;contact guard assist   Transfer Training OT LTG, Assist Device walker, rolling       Goal: Static Standing Balance Goal LTG- OT  Outcome: Ongoing (interventions implemented as appropriate)    12/08/17 1151   Static Standing Balance OT LTG   Static Standing Balance OT LTG, Date Established 12/08/17   Static Standing Balance OT LTG, Time to Achieve by discharge   Static Standing Balance OT LTG, [place] Level supervision required  (5 minutes with functional activity.)   Static Standing Balance OT LTG, Assist Device assistive device  (R/W.)       Goal: Patient Education Goal LTG- OT  Outcome: Ongoing (interventions implemented as appropriate)    12/08/17 1151   Patient Education OT LTG   Patient Education OT LTG, Date Established 12/08/17   Patient Education OT LTG, Time to  Achieve by discharge   Patient Education OT LTG, Education Type HEP;home safety;adaptive equipment mgmt;energy conservation;work simplification   Patient Education OT LTG, Education Understanding demonstrates adequately;verbalizes understanding       Goal: ADL Goal LTG- OT  Outcome: Ongoing (interventions implemented as appropriate)    12/08/17 1151   ADL OT LTG   ADL OT LTG, Date Established 12/08/17   ADL OT LTG, Time to Achieve by discharge   ADL OT LTG, Activity Type ADL skills  (Sponge bath and dress or walk-in shower.)   ADL OT LTG, [place] Level standby assist;contact guard  (R/W.)

## 2017-12-08 NOTE — THERAPY TREATMENT NOTE
Acute Care - Physical Therapy Treatment Note  [facility]     Patient Name: Rebecca Zaragoza  : 1935  MRN: [id number]  Today's Date: 2017  Onset of Illness/Injury or Date of Surgery Date: 17  Date of Referral to PT: 17  Referring Physician: Dr Ibrahim    Admit Date: 2017    Visit Dx:    ICD-10-CM ICD-9-CM   1. Closed bimalleolar fracture of right ankle, initial encounter S82.841A 824.4   2. Impaired mobility and activities of daily living Z74.09 799.89   3. Impaired physical mobility Z74.09 781.99     Patient Active Problem List   Diagnosis   • Ankle fracture, bimalleolar, closed   • Fracture of ankle, trimalleolar, closed, right, initial encounter   • Closed bimalleolar fracture of right ankle               Adult Rehabilitation Note       17 1430          Rehab Assessment/Intervention    Discipline physical therapy assistant  -SS      Document Type therapy note (daily note)  -SS      Subjective Information agree to therapy  -SS      Patient Effort, Rehab Treatment good  -SS      Symptoms Noted During/After Treatment none  -SS      Precautions/Limitations non-weight bearing status;fall precautions   NWB LLE  -SS      Recorded by [SS] Nikki Gonzales PTA      Vital Signs    Pre Systolic BP Rehab 110  -SS      Pre Treatment Diastolic BP 56  -SS      Post Systolic BP Rehab 119  -SS      Post Treatment Diastolic BP 62  -SS      Pretreatment Heart Rate (beats/min) 65  -SS      Posttreatment Heart Rate (beats/min) 76  -SS      Pre SpO2 (%) 98  -SS      O2 Delivery Pre Treatment room air  -SS      Post SpO2 (%) 98  -SS      O2 Delivery Post Treatment room air  -SS      Pre Patient Position Supine  -SS      Intra Patient Position Standing  -SS      Post Patient Position Supine  -SS      Recorded by [SS] Nikki Gonzales PTA      Pain Assessment    Pain Assessment 0-10  -SS      Pain Score 5  -SS      Post Pain Score 5  -SS      Pain Location Ankle  -SS      Pain Orientation  Right  -SS      Pain Intervention(s) Ambulation/increased activity;Repositioned  -SS      Recorded by [SS] Nikki Gonzales PTA      Cognitive Assessment/Intervention    Current Cognitive/Communication Assessment functional  -      Orientation Status oriented x 4  -SS      Follows Commands/Answers Questions 100% of the time  -      Personal Safety WNL/WFL  -      Personal Safety Interventions fall prevention program maintained;gait belt;nonskid shoes/slippers when out of bed  -      Recorded by [SS] Nikki Gonzales PTA      Bed Mobility, Assessment/Treatment    Bed Mobility, Assistive Device bed rails;head of bed elevated  -      Bed Mob, Supine to Sit, [place] supervision required  -      Bed Mob, Sit to Supine, [place] contact guard assist  -      Recorded by [SS] Nikki Gonzales PTA      Transfer Assessment/Treatment    Transfers, Sit-Stand [place] contact guard assist  -SS      Transfers, Stand-Sit [place] contact guard assist  -SS      Transfers, Sit-Stand-Sit, Assist Device rolling walker  -      Transfer, Maintain Weight Bearing Status unable to maintain weight bearing status  -      Transfer, Safety Issues sequencing ability decreased  -SS      Transfer, Impairments impaired balance;unable to maintain weight bearing status;pain  -SS      Recorded by [SS] Nikki Gonzales PTA      Gait Assessment/Treatment    Gait, [place] Level contact guard assist  -      Gait, Assistive Device rolling walker  -      Gait, Distance (Feet) 68  -      Gait, Gait Deviations phyllis decreased;decreased heel strike  -      Gait, Maintain Weight Bearing Status unable to maintain weight bearing status  -      Gait, Safety Issues sequencing ability decreased;steps too close front assistive device  -      Gait, Impairments unable to maintain weight bearing status;impaired balance  -      Gait, Comment Pt requires constant cues for sequencing and to keep weight off of  RLE.   -SS      Recorded by [SS] Nikki Gonzales PTA      Positioning and Restraints    Pre-Treatment Position in bed  -SS      Post Treatment Position bed  -SS      In Bed supine;call light within reach;encouraged to call for assist;exit alarm on;RLE elevated  -SS      Recorded by [SS] Nikki Gonzales PTA        User Key  (r) = Recorded By, (t) = Taken By, (c) = Cosigned By    Initials Name Effective Dates     Nikki Gonzales PTA 10/17/16 -                 IP PT Goals       12/08/17 1514 12/08/17 1027       Bed Mobility PT STG    Bed Mobility PT STG, Time to Achieve  2 days  -CB     Bed Mobility PT STG, Activity Type  supine to sit/sit to supine  -CB     Bed Mobility PT STG, [place] Level  independent  -CB     Bed Mobility PT STG, Additional Goal  HOB down and no rails  -CB     Bed Mobility PT STG, Date Goal Reviewed 12/08/17  -SS      Bed Mobility PT STG, Outcome goal ongoing  -SS      Transfer Training PT STG    Transfer Training PT STG, Date Established  12/08/17  -CB     Transfer Training PT STG, Time to Achieve  2 days  -CB     Transfer Training PT STG, Activity Type  bed to chair /chair to bed;sit to stand/stand to sit  -CB     Transfer Training PT STG, [place] Level  supervision required  -CB     Transfer Training PT STG, Assist Device  walker, rolling  -CB     Transfer Training PT STG, Additional Goal  maintain NWB status  -CB     Transfer Training PT STG, Date Goal Reviewed 12/08/17  -SS      Transfer Training PT STG, Outcome goal ongoing  -SS      Gait Training PT LTG    Gait Training Goal PT LTG, Date Established  12/08/17  -CB     Gait Training Goal PT LTG, Time to Achieve  2 days  -CB     Gait Training Goal PT LTG, [place] Level  contact guard assist  -CB     Gait Training Goal PT LTG, Assist Device  walker, rolling  -CB     Gait Training Goal PT LTG, Distance to Achieve  50  -CB     Gait Training Goal PT LTG, Additional Goal  NWB RLE  -CB     Gait Training Goal PT LTG, Date  Goal Reviewed 12/08/17  -SS      Gait Training Goal PT LTG, Outcome goal ongoing  -SS      Stair Training PT LTG    Stair Training Goal PT LTG, Date Established  12/08/17  -CB     Stair Training Goal PT LTG, Time to Achieve  by discharge  -CB     Stair Training Goal PT LTG, Number of Steps  3  -CB     Stair Training Goal PT LTG, [place] Level  minimum assist (75% patient effort)  -CB     Stair Training Goal PT LTG, Assist Device  1 handrail  -CB     Stair Training Goal PT LTG, Date Goal Reviewed 12/08/17  -SS      Stair Training Goal PT LTG, Outcome goal ongoing  -        User Key  (r) = Recorded By, (t) = Taken By, (c) = Cosigned By    Initials Name Provider Type    CB Lisha Robledo, PT Physical Therapist    SS Nikki Gonzales, PTA Physical Therapy Assistant          Physical Therapy Education     Title: PT OT SLP Therapies (Active)     Topic: Physical Therapy (Active)     Point: Mobility training (Active)    Learning Progress Summary    Learner Readiness Method Response Comment Documented by Status   Patient Acceptance D NR   12/08/17 1410 Active               Point: Precautions (Active)    Learning Progress Summary    Learner Readiness Method Response Comment Documented by Status   Patient Acceptance D NR   12/08/17 1410 Active                      User Key     Initials Effective Dates Name Provider Type Discipline     04/06/17 -  Lisha Robledo, PT Physical Therapist PT                    PT Recommendation and Plan  Anticipated Equipment Needs At Discharge: front wheeled walker  Anticipated Discharge Disposition: skilled nursing facility (patient request)  Planned Therapy Interventions: bed mobility training, gait training, home exercise program, patient/family education, strengthening, transfer training, stair training  PT Frequency: per priority policy (5-14)  Plan of Care Review  Plan Of Care Reviewed With: patient  Progress: improving  Outcome Summary/Follow up Plan: Pt t/f sup-sut with  CGA, c/o increased pain upon EOB. sit-stand CGA. w/ RW. Pt ambulated ~ 68' with RW and CGA- MIn A . Constant cues needed and assistance given to manage AD. Pt having trouble with sequencing and maintaining NWB on RLE. Pt would benifit from continued skilled PT. SNF recommended at D/C for further rehab prior to discharge home. No goals met this pm.           Outcome Measures       12/08/17 1430 12/08/17 1027 12/08/17 0908    How much help from another person do you currently need...    Turning from your back to your side while in flat bed without using bedrails? 3  -SS 3  -CB     Moving from lying on back to sitting on the side of a flat bed without bedrails? 3  -SS 3  -CB     Moving to and from a bed to a chair (including a wheelchair)? 3  -SS 3  -CB     Standing up from a chair using your arms (e.g., wheelchair, bedside chair)? 3  -SS 3  -CB     Climbing 3-5 steps with a railing? 2  -SS 2  -CB     To walk in hospital room? 3  -SS 3  -CB     AM-PAC 6 Clicks Score 17  -SS 17  -CB     How much help from another is currently needed...    Putting on and taking off regular lower body clothing?   2  -RB    Bathing (including washing, rinsing, and drying)   2  -RB    Toileting (which includes using toilet bed pan or urinal)   2  -RB    Putting on and taking off regular upper body clothing   3  -RB    Taking care of personal grooming (such as brushing teeth)   4  -RB    Eating meals   4  -RB    Score   17  -RB    Functional Assessment    Outcome Measure Options AM-PAC 6 Clicks Basic Mobility (PT)  -SS AM-PAC 6 Clicks Basic Mobility (PT)  -CB AM-PAC 6 Clicks Daily Activity (OT)  -RB      User Key  (r) = Recorded By, (t) = Taken By, (c) = Cosigned By    Initials Name Provider Type    CB Lisha Robledo, PT Physical Therapist    RB Blu Joens, OT Occupational Therapist    SS Nikki Gonzales, PTA Physical Therapy Assistant           Time Calculation:         PT Charges       12/08/17 1513 12/08/17 1418       Time  Calculation    Start Time 1430  -SS 1027  -CB     Stop Time 1505  -SS 1059  -CB     Time Calculation (min) 35 min  -SS 32 min  -CB     PT Received On 12/08/17  -SS 12/08/17  -CB     PT Goal Re-Cert Due Date  12/21/17  -CB     Time Calculation- PT    Total Timed Code Minutes- PT 35 minute(s)  -SS 17 minute(s)  -CB       User Key  (r) = Recorded By, (t) = Taken By, (c) = Cosigned By    Initials Name Provider Type    CB Lisha Robledo, PT Physical Therapist    SS Nikki Gonzales PTA Physical Therapy Assistant          Therapy Charges for Today     Code Description Service Date Service Provider Modifiers Qty    55748623608 HC GAIT TRAINING EA 15 MIN 12/8/2017 Nikki Gonzales PTA GP 1    11669155644 HC PT THERAPEUTIC ACT EA 15 MIN 12/8/2017 Nikki Gonzales PTA GP 1          PT G-Codes  PT Professional Judgement Used?: Yes  Outcome Measure Options: AM-PAC 6 Clicks Basic Mobility (PT)  Score: 17  Functional Limitation: Mobility: Walking and moving around  Mobility: Walking and Moving Around Current Status (): At least 40 percent but less than 60 percent impaired, limited or restricted  Mobility: Walking and Moving Around Goal Status (): At least 20 percent but less than 40 percent impaired, limited or restricted    Nikki Gonzales PTA  12/8/2017

## 2017-12-08 NOTE — PLAN OF CARE
Problem: Patient Care Overview (Adult)  Goal: Plan of Care Review  Outcome: Ongoing (interventions implemented as appropriate)    12/08/17 1027   Coping/Psychosocial Response Interventions   Plan Of Care Reviewed With patient   Outcome Evaluation   Outcome Summary/Follow up Plan PT eval completed, was able to come to stand from recliner with rolling walker and CGA of fone but was putting PWB on RLE< was able to ambulate into bathroom and sit then stand with rolling walker CGA f of one and verbal cuew to be NWB but had difficulty, able to ambulate with w=rollng walker an addiditonal 50 feet but always putting some brandi thru RLE, could benefit from skilled PT to regain and return to highest level of function in bed mobility, trnafers, and gait, may benefit rom knee walker or sling for rolling walker       Goal: Discharge Needs Assessment  Outcome: Ongoing (interventions implemented as appropriate)    12/08/17 1027   Discharge Needs Assessment   Concerns To Be Addressed no discharge needs identified   Equipment Needed After Discharge walker, rolling  (may benefit from knee sling or knee walker)   Discharge Facility/Level Of Care Needs skilled transitional care   Current Discharge Risk physical impairment;lives alone   Discharge Disposition still a patient   Self-Care   Equipment Currently Used at Home none   Living Environment   Transportation Available family or friend will provide   Current Health   Outpatient/Agency/Support Group Needs skilled nursing facility (specify)         Problem: Inpatient Physical Therapy  Goal: Bed Mobility Goal STG- PT  Outcome: Ongoing (interventions implemented as appropriate)    12/08/17 1027   Bed Mobility PT STG   Bed Mobility PT STG, Time to Achieve 2 days   Bed Mobility PT STG, Activity Type supine to sit/sit to supine   Bed Mobility PT STG, [place] Level independent   Bed Mobility PT STG, Additional Goal HOB down and no rails       Goal: Transfer Training Goal 1 STG-  PT  Outcome: Ongoing (interventions implemented as appropriate)    12/08/17 1027   Transfer Training PT STG   Transfer Training PT STG, Date Established 12/08/17   Transfer Training PT STG, Time to Achieve 2 days   Transfer Training PT STG, Activity Type bed to chair /chair to bed;sit to stand/stand to sit   Transfer Training PT STG, [place] Level supervision required   Transfer Training PT STG, Assist Device walker, rolling   Transfer Training PT STG, Additional Goal maintain NWB status       Goal: Gait Training Goal LTG- PT  Outcome: Ongoing (interventions implemented as appropriate)    12/08/17 1027   Gait Training PT LTG   Gait Training Goal PT LTG, Date Established 12/08/17   Gait Training Goal PT LTG, Time to Achieve 2 days   Gait Training Goal PT LTG, [place] Level contact guard assist   Gait Training Goal PT LTG, Assist Device walker, rolling   Gait Training Goal PT LTG, Distance to Achieve 50   Gait Training Goal PT LTG, Additional Goal NWB RLE       Goal: Stair Training Goal LTG- PT  Outcome: Ongoing (interventions implemented as appropriate)    12/08/17 1027   Stair Training PT LTG   Stair Training Goal PT LTG, Date Established 12/08/17   Stair Training Goal PT LTG, Time to Achieve by discharge   Stair Training Goal PT LTG, Number of Steps 3   Stair Training Goal PT LTG, [place] Level minimum assist (75% patient effort)   Stair Training Goal PT LTG, Assist Device 1 handrail

## 2017-12-08 NOTE — THERAPY EVALUATION
Acute Care - Physical Therapy Initial Evaluation  [facility]     Patient Name: Rebecca Zaragoza  : 1935  MRN: [id number]  Today's Date: 2017   Onset of Illness/Injury or Date of Surgery Date: 17  Date of Referral to PT: 17  Referring Physician: Dr Ibrahim      Admit Date: 2017     Visit Dx:    ICD-10-CM ICD-9-CM   1. Closed bimalleolar fracture of right ankle, initial encounter S82.841A 824.4   2. Impaired mobility and activities of daily living Z74.09 799.89   3. Impaired physical mobility Z74.09 781.99     Patient Active Problem List   Diagnosis   • Ankle fracture, bimalleolar, closed   • Fracture of ankle, trimalleolar, closed, right, initial encounter   • Closed bimalleolar fracture of right ankle     Past Medical History:   Diagnosis Date   • Osteopetrosis      Past Surgical History:   Procedure Laterality Date   • HYSTERECTOMY            PT ASSESSMENT (last 72 hours)      PT Evaluation       17 1240 17 1027    Rehab Evaluation    Document Type  evaluation  -CB    Subjective Information  agree to therapy  -CB    Patient Effort, Rehab Treatment  good  -CB    Symptoms Noted During/After Treatment  none  -CB    General Information    Patient Profile Review  yes  -CB    Onset of Illness/Injury or Date of Surgery Date  17  -CB    Referring Physician  Dr Ibrahim  -CB    General Observations  in recliner with IV  -CB    Pertinent History Of Current Problem  fall at home, son brought her to ER  -CB    Precautions/Limitations  fall precautions  -CB    Prior Level of Function  independent:;gait;ADL's  -CB    Equipment Currently Used at Home none  -ML none  -CB    Plans/Goals Discussed With  patient  -CB    Risks Reviewed  patient:  -CB    Benefits Reviewed  patient:;improve function;decrease risk of DVT  -CB    Barriers to Rehab  none identified  -CB    Living Environment    Lives With alone  -ML alone  -CB    Living Arrangements house  -ML house  -CB    Home  Accessibility stairs to enter home  -ML stairs within home  -CB    Number of Stairs to Enter Home  3  -CB    Stair Railings at Home none  -ML none  -CB    Type of Financial/Environmental Concern none  -ML none  -CB    Transportation Available family or friend will provide  -ML family or friend will provide  -CB    Clinical Impression    Date of Referral to PT  12/07/17  -CB    PT Diagnosis  impaired physical mobility due to bimalleolar frx R ankle s/p ORIF  -CB    Prognosis  good  -CB    Criteria for Skilled Therapeutic Interventions Met  treatment indicated;yes  -CB    Pathology/Pathophysiology Noted (Describe Specifically for Each System)  musculoskeletal  -CB    Impairments Found (describe specific impairments)  aerobic capacity/endurance;gait, locomotion, and balance  -CB    Functional Limitations in Following Categories (Describe Specific Limitations)  home management;self-care  -CB    Rehab Potential  good, to achieve stated therapy goals  -CB    Predicted Duration of Therapy Intervention (days/wks)  until goals met or discharged  -CB    Vital Signs    Pre Systolic BP Rehab  108  -CB    Pre Treatment Diastolic BP  53  -CB    Post Systolic BP Rehab  127  -CB    Post Treatment Diastolic BP  60  -CB    Pretreatment Heart Rate (beats/min)  65  -CB    Posttreatment Heart Rate (beats/min)  79  -CB    Pre SpO2 (%)  98  -CB    O2 Delivery Pre Treatment  room air  -CB    Post SpO2 (%)  97  -CB    O2 Delivery Post Treatment  room air  -CB    Pre Patient Position  Sitting  -CB    Intra Patient Position  Standing  -CB    Post Patient Position  Supine  -CB    Pain Assessment    Pain Assessment  0-10  -CB    Pain Score  5  -CB    Post Pain Score  5  -CB    Pain Location  Ankle  -CB    Pain Orientation  Right  -CB    Pain Intervention(s)  Ambulation/increased activity;Medication (See MAR)  -CB    Vision Assessment/Intervention    Visual Impairment  WFL with corrective lenses  -CB    Cognitive Assessment/Intervention     Current Cognitive/Communication Assessment  functional  -CB    Orientation Status  oriented x 4  -CB    Follows Commands/Answers Questions  100% of the time  -CB    Personal Safety  WNL/WFL  -CB    Personal Safety Interventions  fall prevention program maintained;gait belt;nonskid shoes/slippers when out of bed  -CB    ROM (Range of Motion)    General ROM  lower extremity range of motion deficits identified  -CB    General ROM Detail  LLE WFL, L hip and knee WFL, ankle not tested  -CB    MMT (Manual Muscle Testing)    General MMT Assessment  lower extremity strength deficits identified  -CB    General MMT Assessment Detail  LLE 4/5, RLE 4/5 hip and knee, ankle not tested  -CB    Mobility Assessment/Training    Extremity Weight-Bearing Status  right upper extremity  -CB    Right Lower Extremity Weight-Bearing  non weight-bearing  -CB    Bed Mobility, Assessment/Treatment    Bed Mobility, Assistive Device  bed rails;head of bed elevated  -CB    Bed Mob, Sit to Supine, [place]  supervision required  -CB    Transfer Assessment/Treatment    Transfers, Sit-Stand [place]  contact guard assist  -CB    Transfers, Stand-Sit [place]  contact guard assist  -CB    Transfers, Sit-Stand-Sit, Assist Device  rolling walker  -CB    Toilet Transfer, [place]  contact guard assist  -CB    Toilet Transfer, Assistive Device  rolling walker  -CB    Transfer, Maintain Weight Bearing Status  unable to maintain weight bearing status  -CB    Transfer, Safety Issues  sequencing ability decreased  -CB    Transfer, Impairments  impaired balance;unable to maintain weight bearing status  -CB    Gait Assessment/Treatment    Gait, [place] Level  contact guard assist  -CB    Gait, Assistive Device  rolling walker  -CB    Gait, Distance (Feet)  70  -CB    Gait, Gait Deviations  phyllis decreased;decreased heel strike  -CB    Gait, Maintain Weight Bearing Status  unable to maintain weight bearing status  -CB    Gait, Safety  Issues  sequencing ability decreased;step length decreased  -CB    Gait, Impairments  unable to maintain weight bearing status;impaired balance  -CB    Sensory Assessment/Intervention    Light Touch  LLE;RLE  -CB    LLE Light Touch  WNL  -CB    RLE Light Touch  WNL  -CB    Positioning and Restraints    Pre-Treatment Position  sitting in chair/recliner  -CB    Post Treatment Position  bed  -CB    In Bed  supine;call light within reach;encouraged to call for assist;exit alarm on;side rails up x2  -CB      12/08/17 0908 12/06/17 1650    Rehab Evaluation    Document Type evaluation  -RB     Subjective Information agree to therapy;complains of;pain  -RB     Patient Effort, Rehab Treatment good  -RB     General Information    Patient Profile Review yes  -RB     Onset of Illness/Injury or Date of Surgery Date 12/05/17  -RB     Referring Physician GEOFF Mcmahan MD.  -RB     General Observations Supine in recliner with IV.  -RB     Pertinent History Of Current Problem Pt fell @ home and sustained R bimalleolar closed fx  with ORIF preformed on 12/7/2017.  -RB     Precautions/Limitations fall precautions;non-weight bearing status;other (see comments)   NWB CHRISTIANNE GAY.  -RB     Prior Level of Function independent:;gait;transfer;ADL's;home management  -RB     Equipment Currently Used at Home none  -RB none  -MM    Plans/Goals Discussed With patient  -RB     Risks Reviewed patient:  -RB     Benefits Reviewed patient:  -RB     Barriers to Rehab none identified  -RB     Living Environment    Lives With alone  -RB     Living Arrangements house  -RB     Home Accessibility stairs to enter home  -RB     Number of Stairs to Enter Home 2  -RB     Stair Railings at Home none  -RB     Transportation Available family or friend will provide  -RB car  -MM    Vital Signs    Pre Systolic BP Rehab 111  -RB     Pre Treatment Diastolic BP 58  -RB     Post Systolic BP Rehab 123  -RB     Post Treatment Diastolic BP 77  -RB     Pretreatment Heart Rate  (beats/min) 75  -RB     Posttreatment Heart Rate (beats/min) 76  -RB     Pre SpO2 (%) 95  -RB     O2 Delivery Pre Treatment room air  -RB     Post SpO2 (%) 96  -RB     O2 Delivery Post Treatment room air  -RB     Pre Patient Position Supine  -RB     Intra Patient Position Standing  -RB     Post Patient Position Supine  -RB     Pain Assessment    Pain Assessment 0-10  -RB     Pain Score 5  -RB     Post Pain Score 5  -RB     Pain Type Surgical pain  -RB     Pain Location Ankle  -RB     Pain Orientation Right  -RB     Pain Intervention(s) Medication (See MAR)  -RB     Vision Assessment/Intervention    Visual Impairment WFL with corrective lenses  -RB     Cognitive Assessment/Intervention    Current Cognitive/Communication Assessment functional  -RB     Orientation Status oriented x 4  -RB     Follows Commands/Answers Questions 100% of the time  -RB     Personal Safety WNL/WFL  -RB     Personal Safety Interventions gait belt;nonskid shoes/slippers when out of bed;supervised activity  -RB     ROM (Range of Motion)    General ROM no range of motion deficits identified  -RB     General ROM Detail B UEs.  -RB     MMT (Manual Muscle Testing)    General MMT Assessment no strength deficits identified  -RB     General MMT Assessment Detail 5/5 B UEs.  -RB     Mobility Assessment/Training    Extremity Weight-Bearing Status right lower extremity  -RB     Right Lower Extremity Weight-Bearing non weight-bearing  -RB     Transfer Assessment/Treatment    Transfers, Sit-Stand [place] contact guard assist  -RB     Transfers, Stand-Sit [place] contact guard assist  -RB     Transfers, Sit-Stand-Sit, Assist Device rolling walker  -RB     Sensory Assessment/Intervention    Light Touch LUE;RUE  -RB     LUE Light Touch WNL  -RB     RUE Light Touch WNL  -RB     Positioning and Restraints    Pre-Treatment Position sitting in chair/recliner  -RB     Post Treatment Position chair  -RB     In Chair reclined;call light within reach  -RB        12/06/17 0212       General Information    Equipment Currently Used at Home none  -BG     Living Environment    Lives With alone  -BG     Living Arrangements house  -BG     Home Accessibility no concerns  -BG     Stair Railings at Home none  -BG     Type of Financial/Environmental Concern none  -BG     Transportation Available car  -BG       User Key  (r) = Recorded By, (t) = Taken By, (c) = Cosigned By    Initials Name Provider Type     Lisha Robledo, PT Physical Therapist    RB Blu Jones, OT Occupational Therapist    ML Kimi Jordan, KELLY Case Manager    BG Mata Bryan, RN Registered Nurse    KASEY Connor RN Registered Nurse          Physical Therapy Education     Title: PT OT SLP Therapies (Active)     Topic: Physical Therapy (Active)     Point: Mobility training (Active)    Learning Progress Summary    Learner Readiness Method Response Comment Documented by Status   Patient Acceptance D NR   12/08/17 1410 Active               Point: Precautions (Active)    Learning Progress Summary    Learner Readiness Method Response Comment Documented by Status   Patient Acceptance D NR   12/08/17 1410 Active                      User Key     Initials Effective Dates Name Provider Type Discipline     04/06/17 -  Lisha Robledo, PT Physical Therapist PT                PT Recommendation and Plan  Anticipated Equipment Needs At Discharge: front wheeled walker  Anticipated Discharge Disposition: skilled nursing facility (patient request)  Planned Therapy Interventions: bed mobility training, gait training, home exercise program, patient/family education, strengthening, transfer training, stair training  PT Frequency: per priority policy (5-14)  Plan of Care Review  Plan Of Care Reviewed With: patient  Outcome Summary/Follow up Plan: PT eval completed, was able to come to stand from recliner with rolling walker and CGA of fone but was putting PWB on RLE< was able to ambulate into bathroom and sit then   stand with rolling walker CGA f of one and verbal cuew to be NWB but had difficulty, able to ambulate with w=rollng walker an addiditonal 50 feet but always putting some brandi thru RLE, could benefit from skilled PT to regain and return to highest  level of function in bed mobility, trnafers, and gait, may benefit rom knee walker or sling for rolling walker          IP PT Goals       12/08/17 1027          Bed Mobility PT STG    Bed Mobility PT STG, Time to Achieve 2 days  -CB      Bed Mobility PT STG, Activity Type supine to sit/sit to supine  -CB      Bed Mobility PT STG, [place] Level independent  -CB      Bed Mobility PT STG, Additional Goal HOB down and no rails  -CB      Transfer Training PT STG    Transfer Training PT STG, Date Established 12/08/17  -CB      Transfer Training PT STG, Time to Achieve 2 days  -CB      Transfer Training PT STG, Activity Type bed to chair /chair to bed;sit to stand/stand to sit  -CB      Transfer Training PT STG, [place] Level supervision required  -CB      Transfer Training PT STG, Assist Device walker, rolling  -CB      Transfer Training PT STG, Additional Goal maintain NWB status  -CB      Gait Training PT LTG    Gait Training Goal PT LTG, Date Established 12/08/17  -CB      Gait Training Goal PT LTG, Time to Achieve 2 days  -CB      Gait Training Goal PT LTG, [place] Level contact guard assist  -CB      Gait Training Goal PT LTG, Assist Device walker, rolling  -CB      Gait Training Goal PT LTG, Distance to Achieve 50  -CB      Gait Training Goal PT LTG, Additional Goal NWB RLE  -CB      Stair Training PT LTG    Stair Training Goal PT LTG, Date Established 12/08/17  -CB      Stair Training Goal PT LTG, Time to Achieve by discharge  -CB      Stair Training Goal PT LTG, Number of Steps 3  -CB      Stair Training Goal PT LTG, [place] Level minimum assist (75% patient effort)  -CB      Stair Training Goal PT LTG, Assist Device 1 handrail  -CB        User Key   (r) = Recorded By, (t) = Taken By, (c) = Cosigned By    Initials Name Provider Type    DUSTY Robledo, PT Physical Therapist                Outcome Measures       12/08/17 1027 12/08/17 0908       How much help from another person do you currently need...    Turning from your back to your side while in flat bed without using bedrails? 3  -CB      Moving from lying on back to sitting on the side of a flat bed without bedrails? 3  -CB      Moving to and from a bed to a chair (including a wheelchair)? 3  -CB      Standing up from a chair using your arms (e.g., wheelchair, bedside chair)? 3  -CB      Climbing 3-5 steps with a railing? 2  -CB      To walk in hospital room? 3  -CB      AM-PAC 6 Clicks Score 17  -CB      How much help from another is currently needed...    Putting on and taking off regular lower body clothing?  2  -RB     Bathing (including washing, rinsing, and drying)  2  -RB     Toileting (which includes using toilet bed pan or urinal)  2  -RB     Putting on and taking off regular upper body clothing  3  -RB     Taking care of personal grooming (such as brushing teeth)  4  -RB     Eating meals  4  -RB     Score  17  -RB     Functional Assessment    Outcome Measure Options AM-PAC 6 Clicks Basic Mobility (PT)  -CB AM-PAC 6 Clicks Daily Activity (OT)  -RB       User Key  (r) = Recorded By, (t) = Taken By, (c) = Cosigned By    Initials Name Provider Type    DUSTY Robledo, PT Physical Therapist    RB Blu Jones, OT Occupational Therapist           Time Calculation:         PT Charges       12/08/17 1418          Time Calculation    Start Time 1027  -CB      Stop Time 1059  -CB      Time Calculation (min) 32 min  -CB      PT Received On 12/08/17  -CB      PT Goal Re-Cert Due Date 12/21/17  -CB      Time Calculation- PT    Total Timed Code Minutes- PT 17 minute(s)  -CB        User Key  (r) = Recorded By, (t) = Taken By, (c) = Cosigned By    Initials Name Provider Type    DUSTY Robledo, PT  Physical Therapist          Therapy Charges for Today     Code Description Service Date Service Provider Modifiers Qty    10804206921 HC PT MOBILITY CURRENT 12/8/2017 Lisha Robledo, PT GP, CK 1    32380021940 HC PT MOBILITY PROJECTED 12/8/2017 Lisha Robledo, PT GP, CJ 1    93637339607 HC PT EVAL MOD COMPLEXITY 1 12/8/2017 Lisha Robledo, PT GP 1    54849955711 HC PT THERAPEUTIC ACT EA 15 MIN 12/8/2017 Lisha Robledo, PT GP 1          PT G-Codes  PT Professional Judgement Used?: Yes  Outcome Measure Options: AM-PAC 6 Clicks Basic Mobility (PT)  Score: 17  Functional Limitation: Mobility: Walking and moving around  Mobility: Walking and Moving Around Current Status (): At least 40 percent but less than 60 percent impaired, limited or restricted  Mobility: Walking and Moving Around Goal Status (): At least 20 percent but less than 40 percent impaired, limited or restricted      Lisha Robledo, PT  12/8/2017

## 2017-12-08 NOTE — PLAN OF CARE
Problem: Patient Care Overview (Adult)  Goal: Plan of Care Review  Outcome: Ongoing (interventions implemented as appropriate)    12/08/17 0138   Coping/Psychosocial Response Interventions   Plan Of Care Reviewed With patient   Patient Care Overview   Progress no change   Outcome Evaluation   Outcome Summary/Follow up Plan VSS, working on pain control       Goal: Adult Individualization and Mutuality  Outcome: Ongoing (interventions implemented as appropriate)  Goal: Discharge Needs Assessment  Outcome: Ongoing (interventions implemented as appropriate)    Problem: Orthopaedic Fracture (Adult)  Goal: Signs and Symptoms of Listed Potential Problems Will be Absent or Manageable (Orthopaedic Fracture)  Outcome: Ongoing (interventions implemented as appropriate)

## 2017-12-08 NOTE — THERAPY EVALUATION
Acute Care - Occupational Therapy Initial Evaluation  [facility]     Patient Name: Rebecca Zaragoza  : 1935  MRN: [id number]  Today's Date: 2017  Onset of Illness/Injury or Date of Surgery Date: 17  Date of Referral to OT: 17  Referring Physician: GEOFF Mcmahan MD.    Admit Date: 2017       ICD-10-CM ICD-9-CM   1. Closed bimalleolar fracture of right ankle, initial encounter S82.841A 824.4   2. Impaired mobility and activities of daily living Z74.09 799.89     Patient Active Problem List   Diagnosis   • Ankle fracture, bimalleolar, closed   • Fracture of ankle, trimalleolar, closed, right, initial encounter   • Closed bimalleolar fracture of right ankle     Past Medical History:   Diagnosis Date   • Osteopetrosis      Past Surgical History:   Procedure Laterality Date   • HYSTERECTOMY            OT ASSESSMENT FLOWSHEET (last 72 hours)      OT Evaluation       17 0908 17 1650 17 0212          Rehab Evaluation    Document Type evaluation  -RB        Subjective Information agree to therapy;complains of;pain  -RB        Patient Effort, Rehab Treatment good  -RB        General Information    Patient Profile Review yes  -RB        Onset of Illness/Injury or Date of Surgery Date 17  -RB        Referring Physician GEOFF Mcmahan MD.  -RB        General Observations Supine in recliner with IV.  -RB        Pertinent History Of Current Problem Pt fell @ home and sustained R bimalleolar closed fx  with ORIF preformed on 2017.  -RB        Precautions/Limitations fall precautions;non-weight bearing status;other (see comments)   NWB R LE.  -RB        Prior Level of Function independent:;gait;transfer;ADL's;home management  -RB        Equipment Currently Used at Home none  -RB none  -MM none  -BG      Plans/Goals Discussed With patient  -RB        Risks Reviewed patient:  -RB        Benefits Reviewed patient:  -RB        Barriers to Rehab none identified  -RB         Living Environment    Lives With alone  -RB  alone  -BG      Living Arrangements house  -RB  house  -BG      Home Accessibility stairs to enter home  -RB  no concerns  -BG      Number of Stairs to Enter Home 2  -RB        Stair Railings at Home none  -RB  none  -BG      Type of Financial/Environmental Concern   none  -BG      Transportation Available family or friend will provide  -RB car  -MM car  -BG      Clinical Impression    Date of Referral to OT 12/08/17  -RB        OT Diagnosis Impaired mobility and ADLs.  -RB        Functional Level At Time Of Evaluation Impaired mobility and ADLs.  -RB        Impairments Found (describe specific impairments) gait, locomotion, and balance  -RB        Patient/Family Goals Statement States she wants to go to SNF for rehab.  -RB        Criteria for Skilled Therapeutic Interventions Met yes;treatment indicated  -RB        Rehab Potential good, to achieve stated therapy goals  -RB        Therapy Frequency --   3-14x/wk  -RB        Predicted Duration of Therapy Intervention (days/wks) Until D/C or goals met.  -RB        Anticipated Equipment Needs At Discharge wheelchair;two wheeled walker  -RB        Anticipated Discharge Disposition skilled nursing facility  -RB        Functional Level Prior    Ambulation 0-->independent  -RB  0-->independent  -BG      Transferring 0-->independent  -RB  0-->independent  -BG      Toileting 0-->independent  -RB  0-->independent  -BG      Bathing 0-->independent  -RB  0-->independent  -BG      Dressing 0-->independent  -RB  0-->independent  -BG      Eating 0-->independent  -RB  0-->independent  -BG      Communication 0-->understands/communicates without difficulty  -RB  0-->understands/communicates without difficulty  -BG      Swallowing 0-->swallows foods/liquids without difficulty  -RB  0-->swallows foods/liquids without difficulty  -BG      Prior Functional Level Comment   Independent  -BG      Vital Signs    Pre Systolic BP Rehab 111  -RB         Pre Treatment Diastolic BP 58  -RB        Post Systolic BP Rehab 123  -RB        Post Treatment Diastolic BP 77  -RB        Pretreatment Heart Rate (beats/min) 75  -RB        Posttreatment Heart Rate (beats/min) 76  -RB        Pre SpO2 (%) 95  -RB        O2 Delivery Pre Treatment room air  -RB        Post SpO2 (%) 96  -RB        O2 Delivery Post Treatment room air  -RB        Pre Patient Position Supine  -RB        Intra Patient Position Standing  -RB        Post Patient Position Supine  -RB        Pain Assessment    Pain Assessment 0-10  -RB        Pain Score 5  -RB        Post Pain Score 5  -RB        Pain Type Surgical pain  -RB        Pain Location Ankle  -RB        Pain Orientation Right  -RB        Pain Intervention(s) Medication (See MAR)  -RB        Vision Assessment/Intervention    Visual Impairment WFL with corrective lenses  -RB        Cognitive Assessment/Intervention    Current Cognitive/Communication Assessment functional  -RB        Orientation Status oriented x 4  -RB        Follows Commands/Answers Questions 100% of the time  -RB        Personal Safety WNL/WFL  -RB        Personal Safety Interventions gait belt;nonskid shoes/slippers when out of bed;supervised activity  -RB        ROM (Range of Motion)    General ROM no range of motion deficits identified  -RB        General ROM Detail B UEs.  -RB        MMT (Manual Muscle Testing)    General MMT Assessment no strength deficits identified  -RB        General MMT Assessment Detail 5/5 B UEs.  -RB        Mobility Assessment/Training    Extremity Weight-Bearing Status right lower extremity  -RB        Right Lower Extremity Weight-Bearing non weight-bearing  -RB        Transfer Assessment/Treatment    Transfers, Sit-Stand [place] contact guard assist  -RB        Transfers, Stand-Sit [place] contact guard assist  -RB        Transfers, Sit-Stand-Sit, Assist Device rolling walker  -RB        Functional Mobility    Functional Mobility- Ind.  Level minimum assist (75% patient effort)  -RB        Functional Mobility- Device rolling walker  -RB        Functional Mobility-Distance (Feet) 12  -RB        Functional Mobility-Maintain WBing assist to maintain weight bearing status  -RB        Functional Mobility- Safety Issues sequencing ability decreased;steps too close front assistive device  -RB        Sensory Assessment/Intervention    Light Touch LUE;RUE  -RB        LUE Light Touch WNL  -RB        RUE Light Touch WNL  -RB        General Therapy Interventions    Planned Therapy Interventions activity intolerance;ADL retraining;balance training;transfer training;strengthening;adaptive equipment training  -RB        Activity Intolerance good  -RB        Positioning and Restraints    Pre-Treatment Position sitting in chair/recliner  -RB        Post Treatment Position chair  -RB        In Chair reclined;call light within reach  -RB          User Key  (r) = Recorded By, (t) = Taken By, (c) = Cosigned By    Initials Name Effective Dates    RB Blu Jones OT 06/15/16 -     BG Mata Bryan RN 10/17/16 -     MM Aramis Connor RN 10/17/16 -            Occupational Therapy Education     Title: PT OT SLP Therapies (Active)     Topic: Occupational Therapy (Active)     Point: Precautions (Done)    Description: Instruct learner(s) on prescribed precautions during self-care and functional transfers.    Learning Progress Summary    Learner Readiness Method Response Comment Documented by Status   Patient Acceptance E VU,NR Edu pt on use of gait belt and non skid socks when OOB and no OOB without assist.  12/08/17 1147 Done                      User Key     Initials Effective Dates Name Provider Type Discipline     06/15/16 -  Blu Jones OT Occupational Therapist OT                  OT Recommendation and Plan  Anticipated Equipment Needs At Discharge: wheelchair, two wheeled walker  Anticipated Discharge Disposition: skilled nursing facility  Planned Therapy  Interventions: activity intolerance, ADL retraining, balance training, transfer training, strengthening, adaptive equipment training  Therapy Frequency:  (3-14x/wk)  Plan of Care Review  Plan Of Care Reviewed With: patient  Outcome Summary/Follow up Plan: OT farhana complete on this date.  Pt required CGA for sit to stand and min A for transfers and short amb.  Pt could benefit from OT services to increase independence with ADLs and functional mobility/transfers.          OT Goals       12/08/17 1151          Transfer Training OT LTG    Transfer Training OT LTG, Date Established 12/08/17  -RB      Transfer Training OT LTG, Time to Achieve by discharge  -RB      Transfer Training OT LTG, Activity Type all transfers  -RB      Transfer Training OT LTG, [place] Level supervision required;contact guard assist  -RB      Transfer Training OT LTG, Assist Device walker, rolling  -RB      Static Standing Balance OT LTG    Static Standing Balance OT LTG, Date Established 12/08/17  -RB      Static Standing Balance OT LTG, Time to Achieve by discharge  -RB      Static Standing Balance OT LTG, [place] Level supervision required   5 minutes with functional activity.  -RB      Static Standing Balance OT LTG, Assist Device assistive device   R/W.  -RB      Patient Education OT LTG    Patient Education OT LTG, Date Established 12/08/17  -RB      Patient Education OT LTG, Time to Achieve by discharge  -RB      Patient Education OT LTG, Education Type HEP;home safety;adaptive equipment mgmt;energy conservation;work simplification  -RB      Patient Education OT LTG, Education Understanding demonstrates adequately;verbalizes understanding  -RB      ADL OT LTG    ADL OT LTG, Date Established 12/08/17  -RB      ADL OT LTG, Time to Achieve by discharge  -RB      ADL OT LTG, Activity Type ADL skills   Sponge bath and dress or walk-in shower.  -RB      ADL OT LTG, [place] Level standby assist;contact guard   R/W.  -RB        User  Key  (r) = Recorded By, (t) = Taken By, (c) = Cosigned By    Initials Name Provider Type    CELESTINO Jones OT Occupational Therapist                Outcome Measures       12/08/17 0908          How much help from another is currently needed...    Putting on and taking off regular lower body clothing? 2  -RB      Bathing (including washing, rinsing, and drying) 2  -RB      Toileting (which includes using toilet bed pan or urinal) 2  -RB      Putting on and taking off regular upper body clothing 3  -RB      Taking care of personal grooming (such as brushing teeth) 4  -RB      Eating meals 4  -RB      Score 17  -RB      Functional Assessment    Outcome Measure Options AM-PAC 6 Clicks Daily Activity (OT)  -RB        User Key  (r) = Recorded By, (t) = Taken By, (c) = Cosigned By    Initials Name Provider Type    CELESTINO Jones OT Occupational Therapist          Time Calculation:   OT Start Time: 0908  OT Stop Time: 0935  OT Time Calculation (min): 27 min    Therapy Charges for Today     Code Description Service Date Service Provider Modifiers Qty    59122391423  OT SELFCARE CURRENT 12/8/2017 CRYSTAL Gardner CK 1    [id number]  OT SELFCARE PROJECTED 12/8/2017 CRYSTAL Gardner CJ 1    [id number]  OT EVAL MOD COMPLEXITY 2 12/8/2017 Blu Jones OT GO 1          OT G-codes  OT Professional Judgement Used?: Yes  OT Functional Scales Options: AM-PAC 6 Clicks Daily Activity (OT)  Score: 17  Functional Limitation: Self care  Self Care Current Status (): At least 40 percent but less than 60 percent impaired, limited or restricted  Self Care Goal Status (): At least 20 percent but less than 40 percent impaired, limited or restricted    Blu Jones OT  12/8/2017

## 2017-12-08 NOTE — DISCHARGE PLACEMENT REQUEST
"Rebecca Funk (82 y.o. Female)     Date of Birth Social Security Number Address Home Phone MRN    1935  [address]  [address]   [place] [address] [phone] [id number]    [employer] Marital Status          Other        Admission Date Admission Type Admitting Provider Attending Provider Department, Room/Bed    12/5/17 Emergency Marco A Ibrahim MD [place], Marco A Hameed MD [address], 364/1    Discharge Date Discharge Disposition Discharge Destination                      Attending Provider: Marco A Ibrahim MD     Allergies:  Contrast Dye    Isolation:  None   Infection:  None   Code Status:  FULL    Ht:  165.1 cm (65\")   Wt:  64.1 kg (141 lb 6.4 oz)    Admission Cmt:  None   Principal Problem:  Fracture of ankle, trimalleolar, closed, right, initial encounter [S82.851A]                 Active Insurance as of 12/5/2017     Primary Coverage     Payor Plan Insurance Group Employer/Plan Group    [facility] MEDICARE REPLACEMENT [facility] [id number]     Payor Plan Address Payor Plan Phone Number Effective From Effective To    [address] [address]  1/1/2017     [place] [address]       Subscriber Name Subscriber Birth Date Member ID       REBECCA FUNK 1935 [id number]           Secondary Coverage     Payor Plan Insurance Group Employer/Plan Group      [id number]     Payor Plan Address Payor Plan Phone Number Effective From Effective To    [address] [phone] 6/29/1990     [place] [address]       Subscriber Name Subscriber Birth Date Member ID       REBECCA FUNK 1935 [id number]                 Emergency Contacts      (Rel.) Home Phone Work Phone Mobile Phone    Karlos Funk (Son) [phone] -- [phone]              "

## 2017-12-09 PROCEDURE — 97530 THERAPEUTIC ACTIVITIES: CPT

## 2017-12-09 PROCEDURE — G0378 HOSPITAL OBSERVATION PER HR: HCPCS

## 2017-12-09 PROCEDURE — 25010000002 MORPHINE PER 10 MG: Performed by: ORTHOPAEDIC SURGERY

## 2017-12-09 PROCEDURE — 97116 GAIT TRAINING THERAPY: CPT

## 2017-12-09 PROCEDURE — 94799 UNLISTED PULMONARY SVC/PX: CPT

## 2017-12-09 PROCEDURE — 97110 THERAPEUTIC EXERCISES: CPT

## 2017-12-09 RX ADMIN — MORPHINE SULFATE 4 MG: 8 INJECTION, SOLUTION INTRAMUSCULAR; INTRAVENOUS at 21:33

## 2017-12-09 RX ADMIN — MORPHINE SULFATE 4 MG: 8 INJECTION, SOLUTION INTRAMUSCULAR; INTRAVENOUS at 09:09

## 2017-12-09 NOTE — PLAN OF CARE
Problem: Patient Care Overview (Adult)  Goal: Plan of Care Review  Outcome: Ongoing (interventions implemented as appropriate)    12/09/17 0849 12/09/17 1332   Coping/Psychosocial Response Interventions   Plan Of Care Reviewed With patient --    Patient Care Overview   Progress no change --    Outcome Evaluation   Outcome Summary/Follow up Plan --  PT shakila tx well with good participation. Pt t/f sup-sit SBAx1, sit-stand-sit w/ SBAx1, amb 32 FWRW CGAX1-initially required vc's to maintain NWB R LE, but was then able to maintain NWB status with gt. Pt was up in chair post tx to eat lunch. Pt will require SNF placement upon D/C.         Problem: Inpatient Physical Therapy  Goal: Bed Mobility Goal STG- PT  Outcome: Ongoing (interventions implemented as appropriate)    12/08/17 1027 12/08/17 1514   Bed Mobility PT STG   Bed Mobility PT STG, Time to Achieve 2 days --    Bed Mobility PT STG, Activity Type supine to sit/sit to supine --    Bed Mobility PT STG, [place] Level independent --    Bed Mobility PT STG, Additional Goal HOB down and no rails --    Bed Mobility PT STG, Date Goal Reviewed --  12/08/17   Bed Mobility PT STG, Outcome --  goal ongoing       Goal: Transfer Training Goal 1 STG- PT  Outcome: Ongoing (interventions implemented as appropriate)    12/08/17 1027 12/08/17 1514   Transfer Training PT STG   Transfer Training PT STG, Date Established 12/08/17 --    Transfer Training PT STG, Time to Achieve 2 days --    Transfer Training PT STG, Activity Type bed to chair /chair to bed;sit to stand/stand to sit --    Transfer Training PT STG, [place] Level supervision required --    Transfer Training PT STG, Assist Device walker, rolling --    Transfer Training PT STG, Additional Goal maintain NWB status --    Transfer Training PT STG, Date Goal Reviewed --  12/08/17   Transfer Training PT STG, Outcome --  goal ongoing       Goal: Gait Training Goal LTG- PT  Outcome: Ongoing (interventions implemented as  appropriate)    12/08/17 1027 12/08/17 1514   Gait Training PT LTG   Gait Training Goal PT LTG, Date Established 12/08/17 --    Gait Training Goal PT LTG, Time to Achieve 2 days --    Gait Training Goal PT LTG, [place] Level contact guard assist --    Gait Training Goal PT LTG, Assist Device walker, rolling --    Gait Training Goal PT LTG, Distance to Achieve 50 --    Gait Training Goal PT LTG, Additional Goal NWB RLE --    Gait Training Goal PT LTG, Date Goal Reviewed --  12/08/17   Gait Training Goal PT LTG, Outcome --  goal ongoing       Goal: Stair Training Goal LTG- PT  Outcome: Ongoing (interventions implemented as appropriate)    12/08/17 1027 12/08/17 1514   Stair Training PT LTG   Stair Training Goal PT LTG, Date Established 12/08/17 --    Stair Training Goal PT LTG, Time to Achieve by discharge --    Stair Training Goal PT LTG, Number of Steps 3 --    Stair Training Goal PT LTG, [place] Level minimum assist (75% patient effort) --    Stair Training Goal PT LTG, Assist Device 1 handrail --    Stair Training Goal PT LTG, Date Goal Reviewed --  12/08/17   Stair Training Goal PT LTG, Outcome --  goal ongoing

## 2017-12-09 NOTE — PLAN OF CARE
Problem: Patient Care Overview (Adult)  Goal: Plan of Care Review  Outcome: Ongoing (interventions implemented as appropriate)    12/09/17 0849 12/09/17 1152   Coping/Psychosocial Response Interventions   Plan Of Care Reviewed With patient --    Patient Care Overview   Progress no change --    Outcome Evaluation   Outcome Summary/Follow up Plan --  Pt engaged in UE exercises in all available planes of motion utilizing 1# weight. Pt requested to walk around her room. Pt required CGA /min A during t/f with v/c reminding her no weight bearing. Continue w/ POC.          Problem: Inpatient Occupational Therapy  Goal: Transfer Training Goal 1 LTG- OT  Outcome: Ongoing (interventions implemented as appropriate)    12/08/17 1151 12/09/17 1152   Transfer Training OT LTG   Transfer Training OT LTG, Date Established 12/08/17 --    Transfer Training OT LTG, Time to Achieve by discharge --    Transfer Training OT LTG, Activity Type all transfers --    Transfer Training OT LTG, [place] Level supervision required;contact guard assist --    Transfer Training OT LTG, Assist Device walker, rolling --    Transfer Training OT LTG, Date Goal Reviewed --  12/09/17       Goal: Static Standing Balance Goal LTG- OT  Outcome: Ongoing (interventions implemented as appropriate)    12/08/17 1151 12/09/17 1152   Static Standing Balance OT LTG   Static Standing Balance OT LTG, Date Established 12/08/17 --    Static Standing Balance OT LTG, Time to Achieve by discharge --    Static Standing Balance OT LTG, [place] Level supervision required  (5 minutes with functional activity.) --    Static Standing Balance OT LTG, Assist Device assistive device  (R/W.) --    Static Standing Balance OT LTG, Date Goal Reviewed --  12/09/17       Goal: Patient Education Goal LTG- OT  Outcome: Ongoing (interventions implemented as appropriate)    12/08/17 1151 12/09/17 1152   Patient Education OT LTG   Patient Education OT LTG, Date Established 12/08/17 --     Patient Education OT LTG, Time to Achieve by discharge --    Patient Education OT LTG, Education Type HEP;home safety;adaptive equipment mgmt;energy conservation;work simplification --    Patient Education OT LTG, Education Understanding demonstrates adequately;verbalizes understanding --    Patient Education OT LTG, Date Goal Reviewed --  12/09/17       Goal: ADL Goal LTG- OT  Outcome: Ongoing (interventions implemented as appropriate)    12/08/17 1151 12/09/17 1152   ADL OT LTG   ADL OT LTG, Date Established 12/08/17 --    ADL OT LTG, Time to Achieve by discharge --    ADL OT LTG, Activity Type ADL skills  (Sponge bath and dress or walk-in shower.) --    ADL OT LTG, [place] Level standby assist;contact guard  (R/W.) --    ADL OT LTG, Date Goal Reviewed --  12/09/17

## 2017-12-09 NOTE — THERAPY TREATMENT NOTE
Acute Care - Physical Therapy Treatment Note  [facility]     Patient Name: Rebecca Zaragoza  : 1935  MRN: [id number]  Today's Date: 2017  Onset of Illness/Injury or Date of Surgery Date: 17  Date of Referral to PT: 17  Referring Physician: Dr Ibrahim    Admit Date: 2017    Visit Dx:    ICD-10-CM ICD-9-CM   1. Closed bimalleolar fracture of right ankle, initial encounter S82.841A 824.4   2. Impaired mobility and activities of daily living Z74.09 799.89   3. Impaired physical mobility Z74.09 781.99     Patient Active Problem List   Diagnosis   • Ankle fracture, bimalleolar, closed   • Fracture of ankle, trimalleolar, closed, right, initial encounter   • Closed bimalleolar fracture of right ankle               Adult Rehabilitation Note       17 1107 17 0911 17 1430    Rehab Assessment/Intervention    Discipline physical therapy assistant  -EM occupational therapy assistant  -CS,PG,CS2 physical therapy assistant  -SS    Document Type therapy note (daily note)  -EM therapy note (daily note)  -CS,PG,CS2 therapy note (daily note)  -SS    Subjective Information agree to therapy;complains of;weakness;pain  -EM agree to therapy;complains of;pain  -CS,PG,CS2 agree to therapy  -SS    Patient Effort, Rehab Treatment   good  -SS    Symptoms Noted During/After Treatment   none  -SS    Precautions/Limitations fall precautions   NWB R LE  -EM fall precautions   non-weightbearing.   -CS,PG,CS2 non-weight bearing status;fall precautions   NWB LLE  -SS    Recorded by [EM] Efra Connor PTA [CS,PG,CS2] ESTELA Ryan/IVONNE (r) Luanne Weeks, OT Student (t) ESTELA Ryan/IVONNE (c) [SS] Nikki Gonzales PTA    Vital Signs    Pre Systolic BP Rehab 168  -  -CS,PG,CS2 110  -SS    Pre Treatment Diastolic BP 86  -EM 52  -CS,PG,CS2 56  -SS    Post Systolic BP Rehab 179  -  -CS,PG,CS2 119  -SS    Post Treatment Diastolic BP 70  -EM 76  -CS,PG,CS2 62  -SS     Pretreatment Heart Rate (beats/min) 78  -EM 80  -CS,PG,CS2 65  -SS    Posttreatment Heart Rate (beats/min) 78  -EM 90  -CS,PG,CS2 76  -SS    Pre SpO2 (%) 93  -EM 92  -CS,PG,CS2 98  -SS    O2 Delivery Pre Treatment room air  -EM room air  -CS,PG,CS2 room air  -SS    Post SpO2 (%) 96  -EM 92  -CS,PG,CS2 98  -SS    O2 Delivery Post Treatment supplemental O2  -EM room air  -CS,PG,CS2 room air  -SS    Pre Patient Position Supine  -EM Supine  -CS,PG,CS2 Supine  -SS    Intra Patient Position  Standing  -CS,PG,CS2 Standing  -SS    Post Patient Position Sitting  -EM Supine  -CS,PG,CS2 Supine  -SS    Recorded by [EM] Efra Connor PTA [CS,PG,CS2] NATE RyanA/IVONNE (r) Luanne Weeks, OT Student (t) ESTELA Ryan/IVONNE (c) [SS] Nikki Gonzales PTA    Pain Assessment    Pain Assessment 0-10  -EM 0-10  -CS,PG,CS2 0-10  -SS    Pain Score 5  -EM 4  -CS,PG,CS2 5  -SS    Post Pain Score 5  -EM 2  -CS,PG,CS2 5  -SS    Pain Type Acute pain  -EM Acute pain  -CS,PG,CS2     Pain Location Ankle  -EM Ankle  -CS,PG,CS2 Ankle  -SS    Pain Orientation Right  -EM Right  -CS,PG,CS2 Right  -SS    Pain Intervention(s)   Ambulation/increased activity;Repositioned  -SS    Recorded by [EM] Efra Connor PTA [CS,PG,CS2] NATE RyanA/IVONNE (r) Luanne Weeks, OT Student (t) ESTELA Ryan/IVONNE (c) [SS] Nikki Gonzales PTA    Vision Assessment/Intervention    Visual Impairment  WFL with corrective lenses  -CS,PG,CS2     Recorded by  [CS,PG,CS2] NATE RyanA/IVONNE (r) Luanne Weeks, OT Student (t) ESTELA Ryan/L (c)     Cognitive Assessment/Intervention    Current Cognitive/Communication Assessment functional  -EM functional  -CS,PG,CS2 functional  -SS    Orientation Status oriented x 4  -EM oriented x 4  -CS,PG,CS2 oriented x 4  -SS    Follows Commands/Answers Questions 100% of the time  -% of the time  -CS,PG,CS2 100% of the time  -SS    Personal Safety WNL/WFL  -EM WNL/WFL  -CS,PG,CS2  WNL/WFL  -SS    Personal Safety Interventions nonskid shoes/slippers when out of bed;gait belt;fall prevention program maintained;supervised activity  -EM fall prevention program maintained  -CS,PG,CS2 fall prevention program maintained;gait belt;nonskid shoes/slippers when out of bed  -SS    Recorded by [EM] Efra Connor PTA [CS,PG,CS2] CHRIS Ryan (r) Luanne Weeks, OT Student (t) CHRIS Ryan (c) [SS] Nikki Gonzales PTA    Bed Mobility, Assessment/Treatment    Bed Mobility, Assistive Device  bed rails;head of bed elevated  -CS,PG,CS2 bed rails;head of bed elevated  -SS    Bed Mobility, Roll Right, [place]  conditional independence  -CS,PG,CS2     Bed Mobility, Scoot/Bridge, [place]  conditional independence  -CS,PG,CS2     Bed Mob, Supine to Sit, [place] supervision required  -EM conditional independence  -CS,PG,CS2 supervision required  -SS    Bed Mob, Sit to Supine, [place]  conditional independence  -CS,PG,CS2 contact guard assist  -SS    Bed Mob, Sidelying to Sit, [place]  conditional independence  -CS,PG,CS2     Bed Mob, Sit to Sidelying, [place]  minimum assist (75% patient effort)   had to help pt with LE  -CS,PG,CS2     Bed Mobility, Comment  --  -CS,PG,CS2     Recorded by [EM] Efra Connor PTA [CS,PG,CS2] CHRIS Ryan (r) Luanne Weeks, OT Student (t) CHRIS Ryan (c) [SS] Nikki Gonzales PTA    Transfer Assessment/Treatment    Transfers, Sit-Stand [place] contact guard assist  -EM contact guard assist  -CS,PG,CS2 contact guard assist  -SS    Transfers, Stand-Sit [place] contact guard assist  -EM contact guard assist  -CS,PG,CS2 contact guard assist  -SS    Transfers, Sit-Stand-Sit, Assist Device rolling walker  -EM rolling walker  -CS,PG,CS2 rolling walker  -SS    Transfer, Maintain Weight Bearing Status  unable to maintain weight bearing status  -CS,PG,CS2 unable to maintain weight bearing status   -SS    Transfer, Safety Issues  sequencing ability decreased  -CS,PG,CS2 sequencing ability decreased  -SS    Transfer, Impairments  impaired balance;unable to maintain weight bearing status  -CS,PG,CS2 impaired balance;unable to maintain weight bearing status;pain  -SS    Transfer, Comment  --  -CS,PG,CS2     Recorded by [EM] Efra Connor PTA [CS,PG,CS2] ESTELA Ryan/IVONNE (r) Luanne Weeks, OT Student (t) CHRIS Ryan (c) [SS] Nikki Gonzales PTA    Gait Assessment/Treatment    Gait, [place] Level contact guard assist  -EM  contact guard assist  -SS    Gait, Assistive Device rolling walker  -EM  rolling walker  -SS    Gait, Distance (Feet) 32  -EM  68  -SS    Gait, Gait Deviations   phyllis decreased;decreased heel strike  -SS    Gait, Maintain Weight Bearing Status able to maintain weight bearing status;other (see comments)   pt req vc's for NWB RLE initially, but then was able to main  -EM  unable to maintain weight bearing status  -SS    Gait, Safety Issues   sequencing ability decreased;steps too close front assistive device  -SS    Gait, Impairments   unable to maintain weight bearing status;impaired balance  -SS    Gait, Comment   Pt requires constant cues for sequencing and to keep weight off of RLE.   -SS    Recorded by [EM] Efra Connor PTA  [SS] Nikki Gonzales, JOSE F    Functional Mobility    Functional Mobility- Ind. Level  contact guard assist  -CS,PG,CS2     Functional Mobility- Device  rolling walker  -CS,PG,CS2     Functional Mobility-Distance (Feet)  15  -CS,PG,CS2     Functional Mobility-Maintain WBing  unable to maintain weight bearing status  -CS,PG,CS2     Recorded by  [CS,PG,CS2] ESTELA Ryan/IVONNE (r) Luanne Weeks, OT Student (t) CHRIS Ryan (c)     Upper Body Bathing Assessment/Training    UB Bathing Assess/Train, Comment  deferred  -CS,PG,CS2     Recorded by  [CS,PG,CS2] ESTELA Ryan/IVONNE (r) Luanne Weeks, OT Student (t)  ESTELA Ryan/L (c)     Lower Body Bathing Assessment/Training    LB Bathing Assess/Train, Comment  deferred  -CS,PG,CS2     Recorded by  [CS,PG,CS2] ESTELA Ryan/IVONNE (r) Luanne Weeks OT Student (t) ESTELA Ryan/L (c)     Upper Body Dressing Assessment/Training    UB Dressing Assess/Train, Comment  deferred  -CS,PG,CS2     Recorded by  [CS,PG,CS2] ESTELA Ryan/IVONNE (r) Luanne Weeks, OT Student (t) ESTELA Ryan/L (c)     Lower Body Dressing Assessment/Training    LB Dressing Assess/Train, Comment  deferred  -CS,PG,CS2     Recorded by  [CS,PG,CS2] ESTELA Ryan/IVONNE (r) Luanne Weeks OT Student (t) ESTELA Ryan/L (c)     Toileting Assessment/Training    Toileting Assess/Train, Comment  deferred  -CS,PG,CS2     Recorded by  [CS,PG,CS2] ESTELA Ryan/IVONNE (r) Luanne Weeks OT Student (t) ESTELA Ryan/L (c)     Grooming Assessment/Training    Grooming Assess/Train, Comment  deferred  -CS,PG,CS2     Recorded by  [CS,PG,CS2] ESTELA Ryan/IVONNE (r) Luanne Weeks OT Student (t) ESTELA Ryan/L (c)     Therapy Exercises    Bilateral Upper Extremity  AROM:;20 reps;supine;elbow flexion/extension;shoulder extension/flexion;shoulder ER/IR;pronation/supination  -CS,PG,CS2     BUE Resistance  manual resistance- minimal  -CS,PG,CS2     Recorded by  [CS,PG,CS2] ESTELA Ryan/IVONNE (r) Luanne Weeks OT Student (t) ESTELA Ryan/IVONNE (c)     Positioning and Restraints    Pre-Treatment Position in bed  -EM in bed  -CS,PG,CS2 in bed  -SS    Post Treatment Position chair   eating lunch  -EM bed  -CS,PG,CS2 bed  -SS    In Bed sitting;call light within reach;encouraged to call for assist;RUE elevated  -EM supine;call light within reach  -CS,PG,CS2 supine;call light within reach;encouraged to call for assist;exit alarm on;RLE elevated  -SS    Recorded by [EM] Efra Connor PTA [CS,PG,CS2] ESTELA Ryan/IVONNE  (r) Luanne Weeks, OT Student (t) Elizabeth Pulliam, PALMER/L (c) [SS] Nikki Gonzales, PTA      User Key  (r) = Recorded By, (t) = Taken By, (c) = Cosigned By    Initials Name Effective Dates    FRANKO Connor, PTA 08/11/15 -     SS Nikki Gonzales, PTA 10/17/16 -     CS Elizabeth Pulliam, PALMER/L 10/17/16 -     PG Luanne Weeks, OT Student 01/31/17 -                 IP PT Goals       12/08/17 1514 12/08/17 1027       Bed Mobility PT STG    Bed Mobility PT STG, Time to Achieve  2 days  -CB     Bed Mobility PT STG, Activity Type  supine to sit/sit to supine  -CB     Bed Mobility PT STG, [place] Level  independent  -CB     Bed Mobility PT STG, Additional Goal  HOB down and no rails  -CB     Bed Mobility PT STG, Date Goal Reviewed 12/08/17  -SS      Bed Mobility PT STG, Outcome goal ongoing  -SS      Transfer Training PT STG    Transfer Training PT STG, Date Established  12/08/17  -CB     Transfer Training PT STG, Time to Achieve  2 days  -CB     Transfer Training PT STG, Activity Type  bed to chair /chair to bed;sit to stand/stand to sit  -CB     Transfer Training PT STG, [place] Level  supervision required  -CB     Transfer Training PT STG, Assist Device  walker, rolling  -CB     Transfer Training PT STG, Additional Goal  maintain NWB status  -CB     Transfer Training PT STG, Date Goal Reviewed 12/08/17  -SS      Transfer Training PT STG, Outcome goal ongoing  -SS      Gait Training PT LTG    Gait Training Goal PT LTG, Date Established  12/08/17  -CB     Gait Training Goal PT LTG, Time to Achieve  2 days  -CB     Gait Training Goal PT LTG, [place] Level  contact guard assist  -CB     Gait Training Goal PT LTG, Assist Device  walker, rolling  -CB     Gait Training Goal PT LTG, Distance to Achieve  50  -CB     Gait Training Goal PT LTG, Additional Goal  NWB RLE  -CB     Gait Training Goal PT LTG, Date Goal Reviewed 12/08/17  -SS      Gait Training Goal PT LTG, Outcome goal ongoing  -SS      Stair  Training PT LTG    Stair Training Goal PT LTG, Date Established  12/08/17  -CB     Stair Training Goal PT LTG, Time to Achieve  by discharge  -CB     Stair Training Goal PT LTG, Number of Steps  3  -CB     Stair Training Goal PT LTG, [place] Level  minimum assist (75% patient effort)  -CB     Stair Training Goal PT LTG, Assist Device  1 handrail  -CB     Stair Training Goal PT LTG, Date Goal Reviewed 12/08/17  -SS      Stair Training Goal PT LTG, Outcome goal ongoing  -SS        User Key  (r) = Recorded By, (t) = Taken By, (c) = Cosigned By    Initials Name Provider Type    CB Lisha Robledo, PT Physical Therapist    SS Nikki Gonzales, PTA Physical Therapy Assistant          Physical Therapy Education     Title: PT OT SLP Therapies (Active)     Topic: Physical Therapy (Active)     Point: Mobility training (Active)    Learning Progress Summary    Learner Readiness Method Response Comment Documented by Status   Patient Acceptance D NR   12/08/17 1410 Active               Point: Precautions (Active)    Learning Progress Summary    Learner Readiness Method Response Comment Documented by Status   Patient Acceptance D NR   12/08/17 1410 Active                      User Key     Initials Effective Dates Name Provider Type Discipline     04/06/17 -  Lisha Robledo, PT Physical Therapist PT                    PT Recommendation and Plan  Anticipated Equipment Needs At Discharge: front wheeled walker  Anticipated Discharge Disposition: skilled nursing facility (patient request)  Planned Therapy Interventions: bed mobility training, gait training, home exercise program, patient/family education, strengthening, transfer training, stair training  PT Frequency: per priority policy (5-14)  Plan of Care Review  Outcome Summary/Follow up Plan: PT shakila tx well with good participation. Pt t/f sup-sit SBAx1, sit-stand-sit w/ SBAx1, amb 32 FWRW CGAX1-initially required vc's to maintain NWB R LE, but was then able to  maintain NWB status with gt.  Pt was up in chair post tx to eat lunch. Pt will require SNF placement upon D/C.          Outcome Measures       12/09/17 1107 12/09/17 1100 12/08/17 1430    How much help from another person do you currently need...    Turning from your back to your side while in flat bed without using bedrails? 4  -EM  3  -SS    Moving from lying on back to sitting on the side of a flat bed without bedrails? 4  -EM  3  -SS    Moving to and from a bed to a chair (including a wheelchair)? 3  -EM  3  -SS    Standing up from a chair using your arms (e.g., wheelchair, bedside chair)? 3  -EM  3  -SS    Climbing 3-5 steps with a railing? 2  -EM  2  -SS    To walk in hospital room? 3  -EM  3  -SS    AM-PAC 6 Clicks Score 19  -EM  17  -SS    How much help from another is currently needed...    Putting on and taking off regular lower body clothing?  2  -CS (r) PG (t) CS (c)     Bathing (including washing, rinsing, and drying)  2  -CS (r) PG (t) CS (c)     Toileting (which includes using toilet bed pan or urinal)  2  -CS (r) PG (t) CS (c)     Putting on and taking off regular upper body clothing  3  -CS (r) PG (t) CS (c)     Taking care of personal grooming (such as brushing teeth)  4  -CS (r) PG (t) CS (c)     Eating meals  4  -CS (r) PG (t) CS (c)     Score  17  -CS (r) PG (t)     Functional Assessment    Outcome Measure Options AM-PAC 6 Clicks Basic Mobility (PT)  -EM  AM-PAC 6 Clicks Basic Mobility (PT)  -SS      12/08/17 1027 12/08/17 0908       How much help from another person do you currently need...    Turning from your back to your side while in flat bed without using bedrails? 3  -CB      Moving from lying on back to sitting on the side of a flat bed without bedrails? 3  -CB      Moving to and from a bed to a chair (including a wheelchair)? 3  -CB      Standing up from a chair using your arms (e.g., wheelchair, bedside chair)? 3  -CB      Climbing 3-5 steps with a railing? 2  -CB      To walk in  hospital room? 3  -CB      AM-PAC 6 Clicks Score 17  -CB      How much help from another is currently needed...    Putting on and taking off regular lower body clothing?  2  -RB     Bathing (including washing, rinsing, and drying)  2  -RB     Toileting (which includes using toilet bed pan or urinal)  2  -RB     Putting on and taking off regular upper body clothing  3  -RB     Taking care of personal grooming (such as brushing teeth)  4  -RB     Eating meals  4  -RB     Score  17  -RB     Functional Assessment    Outcome Measure Options AM-PAC 6 Clicks Basic Mobility (PT)  -CB AM-PAC 6 Clicks Daily Activity (OT)  -RB       User Key  (r) = Recorded By, (t) = Taken By, (c) = Cosigned By    Initials Name Provider Type    FRANKO Connor PTA Physical Therapy Assistant    CB Lisha Robledo, PT Physical Therapist    RB Blu Jones, OT Occupational Therapist    SS Nikki Gonzales, PTA Physical Therapy Assistant    CS Elizabeth Pulliam, ESTELA/L Occupational Therapy Assistant    PG Luanne Weeks, OT Student OT Student           Time Calculation:         PT Charges       12/09/17 1336          Time Calculation    Start Time 1107  -EM      Stop Time 1135  -EM      Time Calculation (min) 28 min  -EM      Time Calculation- PT    Total Timed Code Minutes- PT 28 minute(s)  -EM        User Key  (r) = Recorded By, (t) = Taken By, (c) = Cosigned By    Initials Name Provider Type    FRANKO Connor PTA Physical Therapy Assistant          Therapy Charges for Today     Code Description Service Date Service Provider Modifiers Qty    25420080996 HC GAIT TRAINING EA 15 MIN 12/9/2017 Efra Connor PTA GP 1    72093987976 HC PT THERAPEUTIC ACT EA 15 MIN 12/9/2017 Efra Connor PTA GP 1          PT G-Codes  PT Professional Judgement Used?: Yes  Outcome Measure Options: AM-PAC 6 Clicks Basic Mobility (PT)  Score: 17  Functional Limitation: Mobility: Walking and moving around  Mobility: Walking and Moving Around Current Status  (): At least 40 percent but less than 60 percent impaired, limited or restricted  Mobility: Walking and Moving Around Goal Status (): At least 20 percent but less than 40 percent impaired, limited or restricted    Efra Connor, PTA  12/9/2017

## 2017-12-09 NOTE — THERAPY TREATMENT NOTE
Acute Care - Occupational Therapy Treatment Note  [facility]     Patient Name: Rebecca Zaragoza  : 1935  MRN: [id number]  Today's Date: 2017  Onset of Illness/Injury or Date of Surgery Date: 17  Date of Referral to OT: 17  Referring Physician: Dr Ibrahim      Admit Date: 2017    Visit Dx:     ICD-10-CM ICD-9-CM   1. Closed bimalleolar fracture of right ankle, initial encounter S82.841A 824.4   2. Impaired mobility and activities of daily living Z74.09 799.89   3. Impaired physical mobility Z74.09 781.99     Patient Active Problem List   Diagnosis   • Ankle fracture, bimalleolar, closed   • Fracture of ankle, trimalleolar, closed, right, initial encounter   • Closed bimalleolar fracture of right ankle             Adult Rehabilitation Note       17 0911 17 1430       Rehab Assessment/Intervention    Discipline occupational therapy assistant  -CS,PG,CS2 physical therapy assistant  -SS     Document Type therapy note (daily note)  -CS,PG,CS2 therapy note (daily note)  -SS     Subjective Information agree to therapy;complains of;pain  -CS,PG,CS2 agree to therapy  -SS     Patient Effort, Rehab Treatment  good  -SS     Symptoms Noted During/After Treatment  none  -SS     Precautions/Limitations fall precautions   non-weightbearing.   -CS,PG,CS2 non-weight bearing status;fall precautions   NWB LLE  -SS     Recorded by [CS,PG,CS2] ESTELA Ryan/IVONNE (r) Luanne Weeks OT Student (t) ESTELA Ryan/IVONNE (c) [SS] Nikki Gonzales, JOSE F     Vital Signs    Pre Systolic BP Rehab 104  -CS,PG,CS2 110  -SS     Pre Treatment Diastolic BP 52  -CS,PG,CS2 56  -SS     Post Systolic BP Rehab 169  -CS,PG,CS2 119  -SS     Post Treatment Diastolic BP 76  -CS,PG,CS2 62  -SS     Pretreatment Heart Rate (beats/min) 80  -CS,PG,CS2 65  -SS     Posttreatment Heart Rate (beats/min) 90  -CS,PG,CS2 76  -SS     Pre SpO2 (%) 92  -CS,PG,CS2 98  -SS     O2 Delivery Pre Treatment room air   -CS,PG,CS2 room air  -SS     Post SpO2 (%) 92  -CS,PG,CS2 98  -SS     O2 Delivery Post Treatment room air  -CS,PG,CS2 room air  -SS     Pre Patient Position Supine  -CS,PG,CS2 Supine  -SS     Intra Patient Position Standing  -CS,PG,CS2 Standing  -SS     Post Patient Position Supine  -CS,PG,CS2 Supine  -SS     Recorded by [CS,PG,CS2] ESTELA Ryan/IVONNE (r) Luanne Weeks, OT Student (t) CHRIS Ryan (c) [SS] Nikki Gonzales, JOSE F     Pain Assessment    Pain Assessment 0-10  -CS,PG,CS2 0-10  -SS     Pain Score 4  -CS,PG,CS2 5  -SS     Post Pain Score 2  -CS,PG,CS2 5  -SS     Pain Type Acute pain  -CS,PG,CS2      Pain Location Ankle  -CS,PG,CS2 Ankle  -SS     Pain Orientation Right  -CS,PG,CS2 Right  -SS     Pain Intervention(s)  Ambulation/increased activity;Repositioned  -SS     Recorded by [CS,PG,CS2] ESTELA Ryan/IVONNE (r) Luanne Weeks OT Student (t) CHRIS Ryan (c) [SS] Nikki Gonzales, JOSE F     Vision Assessment/Intervention    Visual Impairment WFL with corrective lenses  -CS,PG,CS2      Recorded by [CS,PG,CS2] ESTELA Ryan/IVONNE (r) Luanne Weeks OT Student (t) CHRIS Ryan (c)      Cognitive Assessment/Intervention    Current Cognitive/Communication Assessment functional  -CS,PG,CS2 functional  -SS     Orientation Status oriented x 4  -CS,PG,CS2 oriented x 4  -SS     Follows Commands/Answers Questions 100% of the time  -CS,PG,CS2 100% of the time  -SS     Personal Safety WNL/WFL  -CS,PG,CS2 WNL/WFL  -SS     Personal Safety Interventions fall prevention program maintained  -CS,PG,CS2 fall prevention program maintained;gait belt;nonskid shoes/slippers when out of bed  -SS     Recorded by [CS,PG,CS2] ESTELA Ryan/IVONNE (r) Luanne Weeks OT Student (t) CHRIS Ryan (c) [SS] Nikki Gonzales, PTA     Bed Mobility, Assessment/Treatment    Bed Mobility, Assistive Device bed rails;head of bed elevated  -CS,PG,CS2 bed rails;head  of bed elevated  -SS     Bed Mobility, Roll Right, [place] conditional independence  -CS,PG,CS2      Bed Mobility, Scoot/Bridge, [place] conditional independence  -CS,PG,CS2      Bed Mob, Supine to Sit, [place] conditional independence  -CS,PG,CS2 supervision required  -SS     Bed Mob, Sit to Supine, [place] conditional independence  -CS,PG,CS2 contact guard assist  -SS     Bed Mob, Sidelying to Sit, [place] conditional independence  -CS,PG,CS2      Bed Mob, Sit to Sidelying, [place] minimum assist (75% patient effort)   had to help pt with LE  -CS,PG,CS2      Bed Mobility, Comment --  -CS,PG,CS2      Recorded by [CS,PG,CS2] ESTELA Ryan/IVONNE (r) Luanne Weeks, OT Student (t) ESTELA Ryan/IVONNE (c) [SS] Nikki Gonzales, PTA     Transfer Assessment/Treatment    Transfers, Sit-Stand [place] contact guard assist  -CS,PG,CS2 contact guard assist  -SS     Transfers, Stand-Sit [place] contact guard assist  -CS,PG,CS2 contact guard assist  -SS     Transfers, Sit-Stand-Sit, Assist Device rolling walker  -CS,PG,CS2 rolling walker  -SS     Transfer, Maintain Weight Bearing Status unable to maintain weight bearing status  -CS,PG,CS2 unable to maintain weight bearing status  -SS     Transfer, Safety Issues sequencing ability decreased  -CS,PG,CS2 sequencing ability decreased  -SS     Transfer, Impairments impaired balance;unable to maintain weight bearing status  -CS,PG,CS2 impaired balance;unable to maintain weight bearing status;pain  -SS     Transfer, Comment --  -CS,PG,CS2      Recorded by [CS,PG,CS2] ESTELA Ryan/IVONNE (r) Luanne Weeks, OT Student (t) ESTELA Ryan/IVONNE (c) [SS] Nikki Gonzales, PTA     Gait Assessment/Treatment    Gait, [place] Level  contact guard assist  -SS     Gait, Assistive Device  rolling walker  -SS     Gait, Distance (Feet)  68  -SS     Gait, Gait Deviations  phyllis decreased;decreased heel strike  -     Gait,  Maintain Weight Bearing Status  unable to maintain weight bearing status  -SS     Gait, Safety Issues  sequencing ability decreased;steps too close front assistive device  -SS     Gait, Impairments  unable to maintain weight bearing status;impaired balance  -SS     Gait, Comment  Pt requires constant cues for sequencing and to keep weight off of RLE.   -SS     Recorded by  [SS] Nikki Gonzales PTA     Functional Mobility    Functional Mobility- Ind. Level contact guard assist  -CS,PG,CS2      Functional Mobility- Device rolling walker  -CS,PG,CS2      Functional Mobility-Distance (Feet) 15  -CS,PG,CS2      Functional Mobility-Maintain WBing unable to maintain weight bearing status  -CS,PG,CS2      Recorded by [CS,PG,CS2] NATE RyanA/IVONNE (r) Luanne Weeks, OT Student (t) ESTELA Ryan/L (c)      Upper Body Bathing Assessment/Training    UB Bathing Assess/Train, Comment deferred  -CS,PG,CS2      Recorded by [CS,PG,CS2] ESTELA Ryan/IVONNE (r) Luanne Weeks, OT Student (t) ESTELA Ryan/L (c)      Lower Body Bathing Assessment/Training    LB Bathing Assess/Train, Comment deferred  -CS,PG,CS2      Recorded by [CS,PG,CS2] ESTELA Ryan/IVONNE (r) Luanne Weeks, OT Student (t) ESTELA Ryan/L (c)      Upper Body Dressing Assessment/Training    UB Dressing Assess/Train, Comment deferred  -CS,PG,CS2      Recorded by [CS,PG,CS2] ESTELA Ryan/IVONNE (r) Luanne Weeks, OT Student (t) ESTELA Ryan/L (c)      Lower Body Dressing Assessment/Training    LB Dressing Assess/Train, Comment deferred  -CS,PG,CS2      Recorded by [CS,PG,CS2] ESTELA Ryan/IVONNE (r) Luanne Weeks OT Student (t) ESTELA Ryan/L (c)      Toileting Assessment/Training    Toileting Assess/Train, Comment deferred  -CS,PG,CS2      Recorded by [CS,PG,CS2] ESTELA Ryan/IVONNE (r) Luanne Weeks, OT Student (t) CHRIS Ryan (c)      Grooming  Assessment/Training    Grooming Assess/Train, Comment deferred  -CS,PG,CS2      Recorded by [CS,PG,CS2] ESTELA Ryan/IVONNE (r) Luanne Weeks, OT Student (t) CHRIS Ryan (dae)      Therapy Exercises    Bilateral Upper Extremity AROM:;20 reps;supine;elbow flexion/extension;shoulder extension/flexion;shoulder ER/IR;pronation/supination  -CS,PG,CS2      BUE Resistance manual resistance- minimal  -CS,PG,CS2      Recorded by [CS,PG,CS2] ESTELA Ryan/IVONNE (r) Luanne Weeks, OT Student (t) CHRIS Ryan (dae)      Positioning and Restraints    Pre-Treatment Position in bed  -CS,PG,CS2 in bed  -SS     Post Treatment Position bed  -CS,PG,CS2 bed  -SS     In Bed supine;call light within reach  -CS,PG,CS2 supine;call light within reach;encouraged to call for assist;exit alarm on;RLE elevated  -SS     Recorded by [CS,PG,CS2] ESTELA Ryan/IVONNE (r) Luanne Weeks, CRYSTAL Student (t) CHRIS Ryan (c) [SS] Nikki Gonzales PTA       User Key  (r) = Recorded By, (t) = Taken By, (c) = Cosigned By    Initials Name Effective Dates    SS Nikki Gonzales, JOSE F 10/17/16 -     CS CHRIS Ryan 10/17/16 -     PG Luanne Weeks, CRYSTAL Student 01/31/17 -                 OT Goals       12/09/17 1152 12/08/17 1151       Transfer Training OT LTG    Transfer Training OT LTG, Date Established  12/08/17  -RB     Transfer Training OT LTG, Time to Achieve  by discharge  -RB     Transfer Training OT LTG, Activity Type  all transfers  -RB     Transfer Training OT LTG, [place] Level  supervision required;contact guard assist  -RB     Transfer Training OT LTG, Assist Device  walker, rolling  -RB     Transfer Training OT LTG, Date Goal Reviewed 12/09/17  -CS (r) PG (t) CS (c)      Static Standing Balance OT LTG    Static Standing Balance OT LTG, Date Established  12/08/17  -RB     Static Standing Balance OT LTG, Time to Achieve  by discharge  -RB     Static Standing Balance OT LTG,  [place] Level  supervision required   5 minutes with functional activity.  -RB     Static Standing Balance OT LTG, Assist Device  assistive device   R/W.  -RB     Static Standing Balance OT LTG, Date Goal Reviewed 12/09/17  -CS (r) PG (t) CS (c)      Patient Education OT LTG    Patient Education OT LTG, Date Established  12/08/17  -RB     Patient Education OT LTG, Time to Achieve  by discharge  -RB     Patient Education OT LTG, Education Type  HEP;home safety;adaptive equipment mgmt;energy conservation;work simplification  -RB     Patient Education OT LTG, Education Understanding  demonstrates adequately;verbalizes understanding  -RB     Patient Education OT LTG, Date Goal Reviewed 12/09/17  -CS (r) PG (t) CS (c)      ADL OT LTG    ADL OT LTG, Date Established  12/08/17  -RB     ADL OT LTG, Time to Achieve  by discharge  -RB     ADL OT LTG, Activity Type  ADL skills   Sponge bath and dress or walk-in shower.  -RB     ADL OT LTG, [place] Level  standby assist;contact guard   R/W.  -RB     ADL OT LTG, Date Goal Reviewed 12/09/17  -CS (r) PG (t) CS (c)        User Key  (r) = Recorded By, (t) = Taken By, (c) = Cosigned By    Initials Name Provider Type    RB Blu Jones, OT Occupational Therapist    CS CHRIS Ryan Occupational Therapy Assistant    AKIN Weeks, OT Student OT Student          Occupational Therapy Education     Title: PT OT SLP Therapies (Active)     Topic: Occupational Therapy (Done)     Point: ADL training (Done)    Description: Instruct learner(s) on proper safety adaptation and remediation techniques during self care or transfers.   Instruct in proper use of assistive devices.    Learning Progress Summary    Learner Readiness Method Response Comment Documented by Status   Patient Acceptance E VU Pt educated on home safety and t/f safety. PG 12/09/17 1151 Done               Point: Home exercise program (Done)    Description: Instruct learner(s) on appropriate technique  for monitoring, assisting and/or progressing therapeutic exercises/activities.    Learning Progress Summary    Learner Readiness Method Response Comment Documented by Status   Patient Acceptance E VU Pt educated on home safety and t/f safety. PG 12/09/17 1151 Done               Point: Precautions (Done)    Description: Instruct learner(s) on prescribed precautions during self-care and functional transfers.    Learning Progress Summary    Learner Readiness Method Response Comment Documented by Status   Patient Acceptance E VU Pt educated on home safety and t/f safety. PG 12/09/17 1151 Done    Acceptance E VU,NR Edu pt on use of gait belt and non skid socks when OOB and no OOB without assist. RB 12/08/17 1147 Done               Point: Body mechanics (Done)    Description: Instruct learner(s) on proper positioning and spine alignment during self-care, functional mobility activities and/or exercises.    Learning Progress Summary    Learner Readiness Method Response Comment Documented by Status   Patient Acceptance E VU Pt educated on home safety and t/f safety. PG 12/09/17 1151 Done                      User Key     Initials Effective Dates Name Provider Type Discipline    RB 06/15/16 -  Blu Jones, OT Occupational Therapist OT    PG 01/31/17 -  Luanne Weeks, OT Student OT Student OT                  OT Recommendation and Plan  Anticipated Equipment Needs At Discharge: wheelchair, two wheeled walker  Anticipated Discharge Disposition: skilled nursing facility  Planned Therapy Interventions: activity intolerance, ADL retraining, balance training, transfer training, strengthening, adaptive equipment training  Therapy Frequency:  (3-14x/wk)           Outcome Measures       12/09/17 1100 12/08/17 1430 12/08/17 1027    How much help from another person do you currently need...    Turning from your back to your side while in flat bed without using bedrails?  3  -SS 3  -CB    Moving from lying on back to sitting on the  side of a flat bed without bedrails?  3  -SS 3  -CB    Moving to and from a bed to a chair (including a wheelchair)?  3  -SS 3  -CB    Standing up from a chair using your arms (e.g., wheelchair, bedside chair)?  3  -SS 3  -CB    Climbing 3-5 steps with a railing?  2  -SS 2  -CB    To walk in hospital room?  3  -SS 3  -CB    AM-PAC 6 Clicks Score  17  -SS 17  -CB    How much help from another is currently needed...    Putting on and taking off regular lower body clothing? 2  -CS (r) PG (t) CS (c)      Bathing (including washing, rinsing, and drying) 2  -CS (r) PG (t) CS (c)      Toileting (which includes using toilet bed pan or urinal) 2  -CS (r) PG (t) CS (c)      Putting on and taking off regular upper body clothing 3  -CS (r) PG (t) CS (c)      Taking care of personal grooming (such as brushing teeth) 4  -CS (r) PG (t) CS (c)      Eating meals 4  -CS (r) PG (t) CS (c)      Score 17  -CS (r) PG (t)      Functional Assessment    Outcome Measure Options  AM-PAC 6 Clicks Basic Mobility (PT)  -SS AM-PAC 6 Clicks Basic Mobility (PT)  -CB      12/08/17 0908          How much help from another is currently needed...    Putting on and taking off regular lower body clothing? 2  -RB      Bathing (including washing, rinsing, and drying) 2  -RB      Toileting (which includes using toilet bed pan or urinal) 2  -RB      Putting on and taking off regular upper body clothing 3  -RB      Taking care of personal grooming (such as brushing teeth) 4  -RB      Eating meals 4  -RB      Score 17  -RB      Functional Assessment    Outcome Measure Options AM-PAC 6 Clicks Daily Activity (OT)  -RB        User Key  (r) = Recorded By, (t) = Taken By, (c) = Cosigned By    Initials Name Provider Type    CB Lisha Robledo, PT Physical Therapist    RB Blu Jones, OT Occupational Therapist    SS Nikki Gonzales, PTA Physical Therapy Assistant    CS ESTELA Ryan/IVONNE Occupational Therapy Assistant    PG Luanne Weeks, OT Student OT  Student           Time Calculation:         Time Calculation- OT       12/09/17 1202          Time Calculation- OT    OT Start Time 0911  -CS      OT Stop Time 1005  -CS      OT Time Calculation (min) 54 min  -CS      Total Timed Code Minutes- OT 54 minute(s)  -CS      OT Received On 12/09/17  -CS        User Key  (r) = Recorded By, (t) = Taken By, (c) = Cosigned By    Initials Name Provider Type    CS ESTELA Ryan/IVONNE Occupational Therapy Assistant           Therapy Charges for Today     Code Description Service Date Service Provider Modifiers Qty    92227001370 HC OT THER PROC EA 15 MIN 12/9/2017 ESTELA Ryan/L GO 2    45086469723 HC OT THERAPEUTIC ACT EA 15 MIN 12/9/2017 ESTELA Ryan/L GO 2          OT G-codes  OT Professional Judgement Used?: Yes  OT Functional Scales Options: AM-PAC 6 Clicks Daily Activity (OT)  Score: 17  Functional Limitation: Self care  Self Care Current Status (): At least 40 percent but less than 60 percent impaired, limited or restricted  Self Care Goal Status (): At least 20 percent but less than 40 percent impaired, limited or restricted    CHRIS Ryan  12/9/2017

## 2017-12-09 NOTE — PROGRESS NOTES
LOS: 1 day   Patient Care Team:  No Known Provider as PCP - General    Chief Complaint:  Fracture of ankle, trimalleolar, closed, right, initial encounter    Subjective     Interval History:     Patient Complaints: none  Patient Denies:    History taken from: patient    Review of Systems:        Objective     Vital Signs  Temp:  [97.1 °F (36.2 °C)-100.9 °F (38.3 °C)] 99 °F (37.2 °C)  Heart Rate:  [70-77] 71  Resp:  [16-18] 18  BP: (104-157)/(57-69) 104/62    Physical Exam:   Ortho Exam's splint is intact neurovascular status is normal     Results Review:     I reviewed the patient's new clinical results.    Medication Review: REVIEWED MED LIST    Assessment/Plan  awaiting placement    Principal Problem:    Fracture of ankle, trimalleolar, closed, right, initial encounter  Active Problems:    Ankle fracture, bimalleolar, closed    Closed bimalleolar fracture of right ankle            Brennon Vogel MD  12/09/17  7:19 AM      Time: 10 min

## 2017-12-09 NOTE — PLAN OF CARE
Problem: Patient Care Overview (Adult)  Goal: Plan of Care Review  Outcome: Ongoing (interventions implemented as appropriate)    12/09/17 0410   Coping/Psychosocial Response Interventions   Plan Of Care Reviewed With patient   Patient Care Overview   Progress no change   Outcome Evaluation   Outcome Summary/Follow up Plan temp increase this shift, encouraging incentive use, pain controlled using prescribed analgesics

## 2017-12-10 PROCEDURE — G0378 HOSPITAL OBSERVATION PER HR: HCPCS

## 2017-12-10 PROCEDURE — 94799 UNLISTED PULMONARY SVC/PX: CPT

## 2017-12-10 PROCEDURE — 97116 GAIT TRAINING THERAPY: CPT

## 2017-12-10 PROCEDURE — 97110 THERAPEUTIC EXERCISES: CPT

## 2017-12-10 RX ORDER — HYDROCODONE BITARTRATE AND ACETAMINOPHEN 5; 325 MG/1; MG/1
1 TABLET ORAL EVERY 6 HOURS PRN
Status: DISCONTINUED | OUTPATIENT
Start: 2017-12-10 | End: 2017-12-12 | Stop reason: HOSPADM

## 2017-12-10 RX ADMIN — DOCUSATE SODIUM 100 MG: 100 CAPSULE, LIQUID FILLED ORAL at 18:27

## 2017-12-10 RX ADMIN — DOCUSATE SODIUM 100 MG: 100 CAPSULE, LIQUID FILLED ORAL at 10:25

## 2017-12-10 RX ADMIN — HYDROCODONE BITARTRATE AND ACETAMINOPHEN 1 TABLET: 5; 325 TABLET ORAL at 10:24

## 2017-12-10 RX ADMIN — HYDROCODONE BITARTRATE AND ACETAMINOPHEN 1 TABLET: 5; 325 TABLET ORAL at 21:18

## 2017-12-10 NOTE — THERAPY TREATMENT NOTE
Acute Care - Physical Therapy Treatment Note  [facility]     Patient Name: Rebecca Zaragoza  : 1935  MRN: [id number]  Today's Date: 12/10/2017  Onset of Illness/Injury or Date of Surgery Date: 17  Date of Referral to PT: 17  Referring Physician: Dr Ibrahim    Admit Date: 2017    Visit Dx:    ICD-10-CM ICD-9-CM   1. Closed bimalleolar fracture of right ankle, initial encounter S82.841A 824.4   2. Impaired mobility and activities of daily living Z74.09 799.89   3. Impaired physical mobility Z74.09 781.99     Patient Active Problem List   Diagnosis   • Ankle fracture, bimalleolar, closed   • Fracture of ankle, trimalleolar, closed, right, initial encounter   • Closed bimalleolar fracture of right ankle               Adult Rehabilitation Note       12/10/17 0952 17 1107 17 0911    Rehab Assessment/Intervention    Discipline physical therapy assistant  -EM physical therapy assistant  -EM occupational therapy assistant  -CS,PG,CS2    Document Type therapy note (daily note)  -EM therapy note (daily note)  -EM therapy note (daily note)  -CS,PG,CS2    Subjective Information agree to therapy  -EM agree to therapy;complains of;weakness;pain  -EM agree to therapy;complains of;pain  -CS,PG,CS2    Precautions/Limitations fall precautions   NWB R LE  -EM fall precautions   NWB R LE  -EM fall precautions   non-weightbearing.   -CS,PG,CS2    Recorded by [EM] Efra Connor PTA [EM] Efra Connor PTA [CS,PG,CS2] ESTELA Ryan/IVONNE (r) Luanne Weeks, OT Student (t) ESTELA Ryan/L (c)    Vital Signs    Pre Systolic BP Rehab 144  -  -  -CS,PG,CS2    Pre Treatment Diastolic BP 66  -EM 86  -EM 52  -CS,PG,CS2    Post Systolic BP Rehab  179  -  -CS,PG,CS2    Post Treatment Diastolic BP  70  -EM 76  -CS,PG,CS2    Pretreatment Heart Rate (beats/min) 84  -EM 78  -EM 80  -CS,PG,CS2    Posttreatment Heart Rate (beats/min)  78  -EM 90  -CS,PG,CS2    Pre SpO2 (%) 94   -EM 93  -EM 92  -CS,PG,CS2    O2 Delivery Pre Treatment room air  -EM room air  -EM room air  -CS,PG,CS2    Post SpO2 (%)  96  -EM 92  -CS,PG,CS2    O2 Delivery Post Treatment  supplemental O2  -EM room air  -CS,PG,CS2    Pre Patient Position Supine  -EM Supine  -EM Supine  -CS,PG,CS2    Intra Patient Position   Standing  -CS,PG,CS2    Post Patient Position Sitting  -EM Sitting  -EM Supine  -CS,PG,CS2    Recorded by [EM] Efra Connor PTA [EM] Efra Connor PTA [CS,PG,CS2] NATE RyanA/IVONNE (r) Luanne Weeks, OT Student (t) ESTELA Ryan/L (c)    Pain Assessment    Pain Assessment 0-10  -EM 0-10  -EM 0-10  -CS,PG,CS2    Pain Score 5  -EM 5  -EM 4  -CS,PG,CS2    Post Pain Score 4  -EM 5  -EM 2  -CS,PG,CS2    Pain Type Acute pain  -EM Acute pain  -EM Acute pain  -CS,PG,CS2    Pain Location Ankle  -EM Ankle  -EM Ankle  -CS,PG,CS2    Pain Orientation Right  -EM Right  -EM Right  -CS,PG,CS2    Recorded by [EM] Efra Connor PTA [EM] Efra Connor PTA [CS,PG,CS2] NATE RyanA/IVONNE (r) Luanne Weeks, OT Student (t) ESTELA Ryan/L (c)    Vision Assessment/Intervention    Visual Impairment   WFL with corrective lenses  -CS,PG,CS2    Recorded by   [CS,PG,CS2] ESTELA Ryan/IVONNE (r) Luanne Weeks, OT Student (t) ESTELA Ryan/L (c)    Cognitive Assessment/Intervention    Current Cognitive/Communication Assessment functional  -EM functional  -EM functional  -CS,PG,CS2    Orientation Status oriented x 4  -EM oriented x 4  -EM oriented x 4  -CS,PG,CS2    Follows Commands/Answers Questions 100% of the time  -% of the time  -% of the time  -CS,PG,CS2    Personal Safety  WNL/WFL  -EM WNL/WFL  -CS,PG,CS2    Personal Safety Interventions nonskid shoes/slippers when out of bed;supervised activity;gait belt;fall prevention program maintained  -EM nonskid shoes/slippers when out of bed;gait belt;fall prevention program maintained;supervised activity  -EM fall  prevention program maintained  -CS,PG,CS2    Recorded by [EM] Efra Connor PTA [EM] Efra Connor, PTA [CS,PG,CS2] ESTELA Ryan/IVONNE (r) Luanne Weeks, OT Student (t) CHRIS Ryan (c)    Bed Mobility, Assessment/Treatment    Bed Mobility, Assistive Device   bed rails;head of bed elevated  -CS,PG,CS2    Bed Mobility, Roll Right, Hall   conditional independence  -CS,PG,CS2    Bed Mobility, Scoot/Bridge, Hall   conditional independence  -CS,PG,CS2    Bed Mob, Supine to Sit, Hall independent  -EM supervision required  -EM conditional independence  -CS,PG,CS2    Bed Mob, Sit to Supine, Hall independent  -EM  conditional independence  -CS,PG,CS2    Bed Mob, Sidelying to Sit, Hall   conditional independence  -CS,PG,CS2    Bed Mob, Sit to Sidelying, Hall   minimum assist (75% patient effort)   had to help pt with LE  -CS,PG,CS2    Bed Mobility, Comment   --  -CS,PG,CS2    Recorded by [EM] Efra Connor, PTA [EM] Efra Connor, PTA [CS,PG,CS2] ESTELA Ryan/IVONNE (r) Luanne Weeks, OT Student (t) CHRIS Ryan (c)    Transfer Assessment/Treatment    Transfers, Sit-Stand Hall independent  -EM contact guard assist  -EM contact guard assist  -CS,PG,CS2    Transfers, Stand-Sit Hall independent  -EM contact guard assist  -EM contact guard assist  -CS,PG,CS2    Transfers, Sit-Stand-Sit, Assist Device rolling walker  -EM rolling walker  -EM rolling walker  -CS,PG,CS2    Transfer, Maintain Weight Bearing Status   unable to maintain weight bearing status  -CS,PG,CS2    Transfer, Safety Issues   sequencing ability decreased  -CS,PG,CS2    Transfer, Impairments   impaired balance;unable to maintain weight bearing status  -CS,PG,CS2    Transfer, Comment   --  -CS,PG,CS2    Recorded by [EM] Efra Connor, PTA [EM] Efra Connor, PTA [CS,PG,CS2] ESTELA Ryan/IVONNE (r) Luanne Weeks, OT Student (t) ESTELA Ryan/IVONNE  (c)    Gait Assessment/Treatment    Gait, [place] Level stand by assist  -EM contact guard assist  -EM     Gait, Assistive Device rolling walker  -EM rolling walker  -EM     Gait, Distance (Feet) 50  -EM 32  -EM     Gait, Maintain Weight Bearing Status able to maintain weight bearing status  -EM able to maintain weight bearing status;other (see comments)   pt req vc's for NWB RLE initially, but then was able to main  -EM     Recorded by [EM] Efra Connor PTA [EM] Efra Connro PTA     Functional Mobility    Functional Mobility- Ind. Level   contact guard assist  -CS,PG,CS2    Functional Mobility- Device   rolling walker  -CS,PG,CS2    Functional Mobility-Distance (Feet)   15  -CS,PG,CS2    Functional Mobility-Maintain WBing   unable to maintain weight bearing status  -CS,PG,CS2    Recorded by   [CS,PG,CS2] ESTELA Ryan/L (r) Luanne Weeks, OT Student (t) ESTELA Ryan/L (c)    Upper Body Bathing Assessment/Training    UB Bathing Assess/Train, Comment   deferred  -CS,PG,CS2    Recorded by   [CS,PG,CS2] ESTELA Ryan/IVONNE (r) Luanne Weeks, OT Student (t) ESTELA Ryan/L (c)    Lower Body Bathing Assessment/Training    LB Bathing Assess/Train, Comment   deferred  -CS,PG,CS2    Recorded by   [CS,PG,CS2] ESTELA Ryan/IVONNE (r) Luanne Weeks OT Student (t) ESTELA Ryan/L (c)    Upper Body Dressing Assessment/Training    UB Dressing Assess/Train, Comment   deferred  -CS,PG,CS2    Recorded by   [CS,PG,CS2] ESTELA Ryan/IVONNE (r) Luanne Weeks OT Student (t) ESTELA Ryan/L (c)    Lower Body Dressing Assessment/Training    LB Dressing Assess/Train, Comment   deferred  -CS,PG,CS2    Recorded by   [CS,PG,CS2] ESTELA Ryan/IVONNE (r) Luanne Weeks OT Student (t) ESTELA Ryan/L (c)    Toileting Assessment/Training    Toileting Assess/Train, Comment   deferred  -CS,PG,CS2    Recorded by   [CS,PG,CS2] ESTELA Ryan/IVONNE  (r) Luanne Weeks, OT Student (t) CHRIS Ryan (c)    Grooming Assessment/Training    Grooming Assess/Train, Comment   deferred  -CS,PG,CS2    Recorded by   [CS,PG,CS2] CHRIS Ryan (r) Luanne Weeks, OT Student (t) CHRIS Ryan (c)    Therapy Exercises    Left Lower Extremity AROM:;20 reps  -EM      Bilateral Lower Extremities AROM:;20 reps;sitting;LAQ;hip flexion;glut sets  -EM      Bilateral Upper Extremity   AROM:;20 reps;supine;elbow flexion/extension;shoulder extension/flexion;shoulder ER/IR;pronation/supination  -CS,PG,CS2    BUE Resistance   manual resistance- minimal  -CS,PG,CS2    Recorded by [EM] Efra Connor PTA  [CS,PG,CS2] CHRIS Ryan (r) Luanne Weeks, OT Student (t) CHRIS Ryan (c)    Positioning and Restraints    Pre-Treatment Position in bed  -EM in bed  -EM in bed  -CS,PG,CS2    Post Treatment Position chair  -EM chair   eating lunch  -EM bed  -CS,PG,CS2    In Bed  sitting;call light within reach;encouraged to call for assist;RUE elevated  -EM supine;call light within reach  -CS,PG,CS2    In Chair reclined;sitting;call light within reach;encouraged to call for assist  -EM      Recorded by [EM] Efra Connor PTA [EM] Efra Connor PTA [CS,PG,CS2] CHRIS Ryan (r) Luanne Weeks, OT Student (t) CHRIS Ryan (c)      12/08/17 1430          Rehab Assessment/Intervention    Discipline physical therapy assistant  -SS      Document Type therapy note (daily note)  -SS      Subjective Information agree to therapy  -SS      Patient Effort, Rehab Treatment good  -SS      Symptoms Noted During/After Treatment none  -SS      Precautions/Limitations non-weight bearing status;fall precautions   NWB LLE  -SS      Recorded by [SS] Nikki Gonzales PTA      Vital Signs    Pre Systolic BP Rehab 110  -SS      Pre Treatment Diastolic BP 56  -SS      Post Systolic BP Rehab 119  -SS      Post Treatment Diastolic BP 62   -      Pretreatment Heart Rate (beats/min) 65  -SS      Posttreatment Heart Rate (beats/min) 76  -SS      Pre SpO2 (%) 98  -SS      O2 Delivery Pre Treatment room air  -SS      Post SpO2 (%) 98  -SS      O2 Delivery Post Treatment room air  -SS      Pre Patient Position Supine  -SS      Intra Patient Position Standing  -SS      Post Patient Position Supine  -SS      Recorded by [SS] Nikki Gonzales PTA      Pain Assessment    Pain Assessment 0-10  -      Pain Score 5  -SS      Post Pain Score 5  -SS      Pain Location Ankle  -SS      Pain Orientation Right  -SS      Pain Intervention(s) Ambulation/increased activity;Repositioned  -SS      Recorded by [SS] Nikki Gonzales PTA      Cognitive Assessment/Intervention    Current Cognitive/Communication Assessment functional  -      Orientation Status oriented x 4  -SS      Follows Commands/Answers Questions 100% of the time  -      Personal Safety WNL/WFL  -      Personal Safety Interventions fall prevention program maintained;gait belt;nonskid shoes/slippers when out of bed  -      Recorded by [SS] Nikki Gonzales PTA      Bed Mobility, Assessment/Treatment    Bed Mobility, Assistive Device bed rails;head of bed elevated  -      Bed Mob, Supine to Sit, Johnson supervision required  -      Bed Mob, Sit to Supine, Johnson contact guard assist  -SS      Recorded by [SS] Nikki Gonzales PTA      Transfer Assessment/Treatment    Transfers, Sit-Stand Johnson contact guard assist  -SS      Transfers, Stand-Sit Johnson contact guard assist  -SS      Transfers, Sit-Stand-Sit, Assist Device rolling walker  -      Transfer, Maintain Weight Bearing Status unable to maintain weight bearing status  -      Transfer, Safety Issues sequencing ability decreased  -      Transfer, Impairments impaired balance;unable to maintain weight bearing status;pain  -      Recorded by [SS] Nikki Gonzales PTA      Gait Assessment/Treatment     Gait, [place]yer Level contact guard assist  -SS      Gait, Assistive Device rolling walker  -SS      Gait, Distance (Feet) 68  -SS      Gait, Gait Deviations phyllis decreased;decreased heel strike  -SS      Gait, Maintain Weight Bearing Status unable to maintain weight bearing status  -SS      Gait, Safety Issues sequencing ability decreased;steps too close front assistive device  -SS      Gait, Impairments unable to maintain weight bearing status;impaired balance  -SS      Gait, Comment Pt requires constant cues for sequencing and to keep weight off of RLE.   -SS      Recorded by [SS] Nikki Gonzales PTA      Positioning and Restraints    Pre-Treatment Position in bed  -SS      Post Treatment Position bed  -SS      In Bed supine;call light within reach;encouraged to call for assist;exit alarm on;RLE elevated  -SS      Recorded by [SS] Nikki Gonzales PTA        User Key  (r) = Recorded By, (t) = Taken By, (c) = Cosigned By    Initials Name Effective Dates    EM Efra Connor, PTA 08/11/15 -     SS Nikki Gonzales, PTA 10/17/16 -     CS CHRIS Ryan 10/17/16 -     PG Luanne Weeks, OT Student 01/31/17 -                 IP PT Goals       12/10/17 1142 12/08/17 1514 12/08/17 1027    Bed Mobility PT STG    Bed Mobility PT STG, Time to Achieve   2 days  -CB    Bed Mobility PT STG, Activity Type   supine to sit/sit to supine  -CB    Bed Mobility PT STG, [place]yer Level   independent  -CB    Bed Mobility PT STG, Additional Goal   HOB down and no rails  -CB    Bed Mobility PT STG, Date Goal Reviewed  12/08/17  -SS     Bed Mobility PT STG, Outcome goal met  -EM goal ongoing  -SS     Transfer Training PT STG    Transfer Training PT STG, Date Established   12/08/17  -CB    Transfer Training PT STG, Time to Achieve   2 days  -CB    Transfer Training PT STG, Activity Type   bed to chair /chair to bed;sit to stand/stand to sit  -CB    Transfer Training PT STG, [place]yer Level   supervision  required  -CB    Transfer Training PT STG, Assist Device   walker, rolling  -CB    Transfer Training PT STG, Additional Goal   maintain NWB status  -CB    Transfer Training PT STG, Date Goal Reviewed  12/08/17  -SS     Transfer Training PT STG, Outcome goal met  -EM goal ongoing  -SS     Gait Training PT LTG    Gait Training Goal PT LTG, Date Established   12/08/17  -CB    Gait Training Goal PT LTG, Time to Achieve   2 days  -CB    Gait Training Goal PT LTG, [place] Level   contact guard assist  -CB    Gait Training Goal PT LTG, Assist Device   walker, rolling  -CB    Gait Training Goal PT LTG, Distance to Achieve   50  -CB    Gait Training Goal PT LTG, Additional Goal   NWB RLE  -CB    Gait Training Goal PT LTG, Date Goal Reviewed  12/08/17  -SS     Gait Training Goal PT LTG, Outcome goal met  -EM goal ongoing  -SS     Stair Training PT LTG    Stair Training Goal PT LTG, Date Established   12/08/17  -CB    Stair Training Goal PT LTG, Time to Achieve   by discharge  -CB    Stair Training Goal PT LTG, Number of Steps   3  -CB    Stair Training Goal PT LTG, [place] Level   minimum assist (75% patient effort)  -CB    Stair Training Goal PT LTG, Assist Device   1 handrail  -CB    Stair Training Goal PT LTG, Date Goal Reviewed  12/08/17  -SS     Stair Training Goal PT LTG, Outcome  goal ongoing  -       User Key  (r) = Recorded By, (t) = Taken By, (c) = Cosigned By    Initials Name Provider Type    EM Efra Connor, PTA Physical Therapy Assistant    CB Lisha Robledo, PT Physical Therapist    SS Nikki Gonzales, PTA Physical Therapy Assistant          Physical Therapy Education     Title: PT OT SLP Therapies (Active)     Topic: Physical Therapy (Active)     Point: Mobility training (Active)    Learning Progress Summary    Learner Readiness Method Response Comment Documented by Status   Patient Acceptance D NR  CB 12/08/17 1410 Active               Point: Precautions (Active)    Learning Progress Summary     Learner Readiness Method Response Comment Documented by Status   Patient Acceptance D NR   12/08/17 1410 Active                      User Key     Initials Effective Dates Name Provider Type Discipline     04/06/17 -  Lisha Robledo, PT Physical Therapist PT                    PT Recommendation and Plan  Anticipated Equipment Needs At Discharge: front wheeled walker  Anticipated Discharge Disposition: skilled nursing facility (patient request)  Planned Therapy Interventions: bed mobility training, gait training, home exercise program, patient/family education, strengthening, transfer training, stair training  PT Frequency: per priority policy (5-14)  Plan of Care Review  Outcome Summary/Follow up Plan: Pt shakila tx well with improved gt and ability to maintain NWB R LE noted this tx. Pt t/f sup-sit-sup Ind, sit-stand-sit Ind, amb 50' with SBAx1 with FWRW and able to maintain NWB R LE.  Pt will likely require SNF upon D/C          Outcome Measures       12/10/17 0952 12/09/17 1107 12/09/17 1100    How much help from another person do you currently need...    Turning from your back to your side while in flat bed without using bedrails? 4  -EM 4  -EM     Moving from lying on back to sitting on the side of a flat bed without bedrails? 4  -EM 4  -EM     Moving to and from a bed to a chair (including a wheelchair)? 4  -EM 3  -EM     Standing up from a chair using your arms (e.g., wheelchair, bedside chair)? 4  -EM 3  -EM     Climbing 3-5 steps with a railing? 2  -EM 2  -EM     To walk in hospital room? 3  -EM 3  -EM     AM-PAC 6 Clicks Score 21  -EM 19  -EM     How much help from another is currently needed...    Putting on and taking off regular lower body clothing?   2  -CS (r) PG (t) CS (c)    Bathing (including washing, rinsing, and drying)   2  -CS (r) PG (t) CS (c)    Toileting (which includes using toilet bed pan or urinal)   2  -CS (r) PG (t) CS (c)    Putting on and taking off regular upper body clothing   3   -CS (r) PG (t) CS (c)    Taking care of personal grooming (such as brushing teeth)   4  -CS (r) PG (t) CS (c)    Eating meals   4  -CS (r) PG (t) CS (c)    Score   17  -CS (r) PG (t)    Functional Assessment    Outcome Measure Options AM-PAC 6 Clicks Basic Mobility (PT)  -EM AM-PAC 6 Clicks Basic Mobility (PT)  -EM       12/08/17 1430 12/08/17 1027 12/08/17 0908    How much help from another person do you currently need...    Turning from your back to your side while in flat bed without using bedrails? 3  -SS 3  -CB     Moving from lying on back to sitting on the side of a flat bed without bedrails? 3  -SS 3  -CB     Moving to and from a bed to a chair (including a wheelchair)? 3  -SS 3  -CB     Standing up from a chair using your arms (e.g., wheelchair, bedside chair)? 3  -SS 3  -CB     Climbing 3-5 steps with a railing? 2  -SS 2  -CB     To walk in hospital room? 3  -SS 3  -CB     AM-PAC 6 Clicks Score 17  -SS 17  -CB     How much help from another is currently needed...    Putting on and taking off regular lower body clothing?   2  -RB    Bathing (including washing, rinsing, and drying)   2  -RB    Toileting (which includes using toilet bed pan or urinal)   2  -RB    Putting on and taking off regular upper body clothing   3  -RB    Taking care of personal grooming (such as brushing teeth)   4  -RB    Eating meals   4  -RB    Score   17  -RB    Functional Assessment    Outcome Measure Options AM-PAC 6 Clicks Basic Mobility (PT)  -SS AM-PAC 6 Clicks Basic Mobility (PT)  -CB AM-PAC 6 Clicks Daily Activity (OT)  -RB      User Key  (r) = Recorded By, (t) = Taken By, (c) = Cosigned By    Initials Name Provider Type    EM Efra Connor, PTA Physical Therapy Assistant    CB Lisha Robledo, PT Physical Therapist    RB Blu Jones, OT Occupational Therapist    SS Nikki Gonzales, PTA Physical Therapy Assistant    CS ESTELA Ryan/IVONNE Occupational Therapy Assistant    PG Luanne Weeks, OT Student OT Student            Time Calculation:         PT Charges       12/10/17 1145          Time Calculation    Start Time 0952  -EM      Stop Time 1023  -EM      Time Calculation (min) 31 min  -EM      Time Calculation- PT    Total Timed Code Minutes- PT 31 minute(s)  -EM        User Key  (r) = Recorded By, (t) = Taken By, (c) = Cosigned By    Initials Name Provider Type    EM Efra Connor PTA Physical Therapy Assistant          Therapy Charges for Today     Code Description Service Date Service Provider Modifiers Qty    61316972826 HC GAIT TRAINING EA 15 MIN 12/9/2017 Efra Connor PTA GP 1    08748173262 HC PT THERAPEUTIC ACT EA 15 MIN 12/9/2017 Efra Connor PTA GP 1    84895860119 HC GAIT TRAINING EA 15 MIN 12/10/2017 Efra Connor, JOSE F GP 1    98417361308 HC PT THER PROC EA 15 MIN 12/10/2017 Efra Connor, JOSE F GP 1          PT G-Codes  PT Professional Judgement Used?: Yes  Outcome Measure Options: AM-PAC 6 Clicks Basic Mobility (PT)  Score: 17  Functional Limitation: Mobility: Walking and moving around  Mobility: Walking and Moving Around Current Status (): At least 40 percent but less than 60 percent impaired, limited or restricted  Mobility: Walking and Moving Around Goal Status (): At least 20 percent but less than 40 percent impaired, limited or restricted    Efra Connor PTA  12/10/2017

## 2017-12-10 NOTE — PROGRESS NOTES
LOS: 1 day   Patient Care Team:  No Known Provider as PCP - General    Chief Complaint:  Fracture of ankle, trimalleolar, closed, right, initial encounter    Subjective     Interval History:     Patient Complaints: none  Patient Denies:    History taken from: patient    Review of Systems:        Objective     Vital Signs  Temp:  [98.6 °F (37 °C)-100.1 °F (37.8 °C)] 98.6 °F (37 °C)  Heart Rate:  [66-78] 66  Resp:  [18] 18  BP: (108-148)/(52-82) 108/52    Physical Exam:   Ortho Exam splint OK neurovascular status okay     Results Review:     I reviewed the patient's new clinical results.    Medication Review: REVIEWED MED LIST    Assessment/Plan     Principal Problem:    Fracture of ankle, trimalleolar, closed, right, initial encounter  Active Problems:    Ankle fracture, bimalleolar, closed    Closed bimalleolar fracture of right ankle      Awaiting disposition      Brennon Vogel MD  12/10/17  8:01 AM      Time:

## 2017-12-10 NOTE — PLAN OF CARE
Problem: Patient Care Overview (Adult)  Goal: Plan of Care Review  Outcome: Ongoing (interventions implemented as appropriate)  Goal: Adult Individualization and Mutuality  Outcome: Ongoing (interventions implemented as appropriate)    Problem: Orthopaedic Fracture (Adult)  Goal: Signs and Symptoms of Listed Potential Problems Will be Absent or Manageable (Orthopaedic Fracture)  Outcome: Ongoing (interventions implemented as appropriate)

## 2017-12-10 NOTE — PLAN OF CARE
Problem: Patient Care Overview (Adult)  Goal: Plan of Care Review  Outcome: Ongoing (interventions implemented as appropriate)    12/10/17 0251   Coping/Psychosocial Response Interventions   Plan Of Care Reviewed With patient   Patient Care Overview   Progress improving   Outcome Evaluation   Outcome Summary/Follow up Plan pt states feeling better today. pain controlled using prescribed analgesics, pt would be able to tolerat oral pain meds at this time.

## 2017-12-10 NOTE — PLAN OF CARE
Problem: Patient Care Overview (Adult)  Goal: Plan of Care Review  Outcome: Ongoing (interventions implemented as appropriate)    12/10/17 0251 12/10/17 1142   Coping/Psychosocial Response Interventions   Plan Of Care Reviewed With patient --    Patient Care Overview   Progress improving --    Outcome Evaluation   Outcome Summary/Follow up Plan --  Pt shakila tx well with improved gt and ability to maintain NWB R LE noted this tx. Pt t/f sup-sit-sup Ind, sit-stand-sit Ind, amb 50' with SBAx1 with FWRW and able to maintain NWB R LE. Pt will likely require SNF upon D/C         Problem: Inpatient Physical Therapy  Goal: Bed Mobility Goal STG- PT  Outcome: Outcome(s) achieved Date Met:  12/10/17    12/08/17 1027 12/08/17 1514 12/10/17 1142   Bed Mobility PT STG   Bed Mobility PT STG, Time to Achieve 2 days --  --    Bed Mobility PT STG, Activity Type supine to sit/sit to supine --  --    Bed Mobility PT STG, [place] Level independent --  --    Bed Mobility PT STG, Additional Goal HOB down and no rails --  --    Bed Mobility PT STG, Date Goal Reviewed --  12/08/17 --    Bed Mobility PT STG, Outcome --  --  goal met       Goal: Transfer Training Goal 1 STG- PT  Outcome: Outcome(s) achieved Date Met:  12/10/17    12/08/17 1027 12/08/17 1514 12/10/17 1142   Transfer Training PT STG   Transfer Training PT STG, Date Established 12/08/17 --  --    Transfer Training PT STG, Time to Achieve 2 days --  --    Transfer Training PT STG, Activity Type bed to chair /chair to bed;sit to stand/stand to sit --  --    Transfer Training PT STG, [place] Level supervision required --  --    Transfer Training PT STG, Assist Device walker, rolling --  --    Transfer Training PT STG, Additional Goal maintain NWB status --  --    Transfer Training PT STG, Date Goal Reviewed --  12/08/17 --    Transfer Training PT STG, Outcome --  --  goal met       Goal: Gait Training Goal LTG- PT  Outcome: Outcome(s) achieved Date Met:  12/10/17  Goal:  Stair Training Goal LTG- PT  Outcome: Ongoing (interventions implemented as appropriate)    12/08/17 1027 12/08/17 1514   Stair Training PT LTG   Stair Training Goal PT LTG, Date Established 12/08/17 --    Stair Training Goal PT LTG, Time to Achieve by discharge --    Stair Training Goal PT LTG, Number of Steps 3 --    Stair Training Goal PT LTG, [place] Level minimum assist (75% patient effort) --    Stair Training Goal PT LTG, Assist Device 1 handrail --    Stair Training Goal PT LTG, Date Goal Reviewed --  12/08/17   Stair Training Goal PT LTG, Outcome --  goal ongoing

## 2017-12-11 PROCEDURE — 97110 THERAPEUTIC EXERCISES: CPT

## 2017-12-11 PROCEDURE — 97116 GAIT TRAINING THERAPY: CPT

## 2017-12-11 PROCEDURE — G0378 HOSPITAL OBSERVATION PER HR: HCPCS

## 2017-12-11 RX ORDER — HYDROCODONE BITARTRATE AND ACETAMINOPHEN 5; 325 MG/1; MG/1
1 TABLET ORAL EVERY 6 HOURS PRN
Qty: 50 TABLET | Refills: 0 | Status: SHIPPED | OUTPATIENT
Start: 2017-12-11 | End: 2017-12-20

## 2017-12-11 RX ADMIN — HYDROCODONE BITARTRATE AND ACETAMINOPHEN 1 TABLET: 5; 325 TABLET ORAL at 03:55

## 2017-12-11 RX ADMIN — HYDROCODONE BITARTRATE AND ACETAMINOPHEN 1 TABLET: 5; 325 TABLET ORAL at 23:26

## 2017-12-11 RX ADMIN — DOCUSATE SODIUM 100 MG: 100 CAPSULE, LIQUID FILLED ORAL at 18:02

## 2017-12-11 NOTE — PLAN OF CARE
Problem: Patient Care Overview (Adult)  Goal: Plan of Care Review  Outcome: Ongoing (interventions implemented as appropriate)    12/11/17 1635   Patient Care Overview   Progress progress toward functional goals as expected   Outcome Evaluation   Outcome Summary/Follow up Plan Pt did not meet any PT goals this tx. Pt able to amb 60 ft w/RW and 200 ft w/knee scooter CGA. Pt needs SFN placement.

## 2017-12-11 NOTE — DISCHARGE INSTRUCTIONS
No weight bearing injured leg  Ambulate with walker  Keep leg elevated, do not remove splint  Return for follow up orthopaedic surgery, 2 weeks.

## 2017-12-11 NOTE — THERAPY TREATMENT NOTE
Acute Care - Physical Therapy Treatment Note  [facility]     Patient Name: Rebecca Zaragoza  : 1935  MRN: [id number]  Today's Date: 2017  Onset of Illness/Injury or Date of Surgery Date: 17  Date of Referral to PT: 17  Referring Physician: Dr Ibrahim    Admit Date: 2017    Visit Dx:    ICD-10-CM ICD-9-CM   1. Closed bimalleolar fracture of right ankle, initial encounter S82.841A 824.4   2. Impaired mobility and activities of daily living Z74.09 799.89   3. Impaired physical mobility Z74.09 781.99     Patient Active Problem List   Diagnosis   • Ankle fracture, bimalleolar, closed   • Fracture of ankle, trimalleolar, closed, right, initial encounter   • Closed bimalleolar fracture of right ankle               Adult Rehabilitation Note       17 1345 17 1050 12/10/17 0952    Rehab Assessment/Intervention    Discipline physical therapy assistant  -AM physical therapy assistant  -AM physical therapy assistant  -EM    Document Type therapy note (daily note)  -AM therapy note (daily note)  -AM therapy note (daily note)  -EM    Subjective Information agree to therapy;no complaints  -AM agree to therapy;no complaints  -AM agree to therapy  -EM    Patient Effort, Rehab Treatment good  -AM good  -AM     Symptoms Noted During/After Treatment fatigue  -AM fatigue  -AM     Precautions/Limitations fall precautions;non-weight bearing status  -AM fall precautions;non-weight bearing status  -AM fall precautions   NWB R LE  -EM    Equipment Issued to Patient gait belt  -AM gait belt  -AM     Recorded by [AM] Luis Enrique Pinto PTA [AM] Luis Enrique Pinto PTA [EM] Efra Connor PTA    Vital Signs    Pre Systolic BP Rehab 128  -  -  -EM    Pre Treatment Diastolic BP 83  -AM 62  -AM 66  -EM    Post Systolic BP Rehab 113  -  -AM     Post Treatment Diastolic BP 61  -AM 66  -AM     Pretreatment Heart Rate (beats/min) 86  -AM 67  -AM 84  -EM    Posttreatment Heart Rate  (beats/min) 79  -AM 71  -AM     Pre SpO2 (%) 93  -AM 93  -AM 94  -EM    O2 Delivery Pre Treatment room air  -AM room air  -AM room air  -EM    Post SpO2 (%) 95  -AM 96  -AM     O2 Delivery Post Treatment room air  -AM room air  -AM     Pre Patient Position Supine  -AM Supine  -AM Supine  -EM    Intra Patient Position Standing  -AM Standing  -AM     Post Patient Position Supine  -AM Sitting  -AM Sitting  -EM    Recorded by [AM] Luis Enrique Pinto PTA [AM] Luis Enrique Pinto PTA [EM] Efra Connor PTA    Pain Assessment    Pain Assessment No/denies pain  -AM No/denies pain  -AM 0-10  -EM    Pain Score   5  -EM    Post Pain Score   4  -EM    Pain Type   Acute pain  -EM    Pain Location   Ankle  -EM    Pain Orientation   Right  -EM    Recorded by [AM] Luis Enrique Pinto PTA [AM] Luis Enrique Pinto PTA [EM] Efra Connor PTA    Cognitive Assessment/Intervention    Current Cognitive/Communication Assessment functional  -AM functional  -AM functional  -EM    Orientation Status oriented x 4  -AM oriented x 4  -AM oriented x 4  -EM    Follows Commands/Answers Questions 100% of the time  -% of the time  -% of the time  -EM    Personal Safety Interventions gait belt;nonskid shoes/slippers when out of bed;supervised activity  -AM gait belt;nonskid shoes/slippers when out of bed;supervised activity  -AM nonskid shoes/slippers when out of bed;supervised activity;gait belt;fall prevention program maintained  -EM    Recorded by [AM] Luis Enrique Pinto PTA [AM] Luis Enrique Pinto PTA [EM] Efra Connor PTA    ROM (Range of Motion)    General ROM lower extremity range of motion deficits identified  -AM lower extremity range of motion deficits identified  -AM     Recorded by [AM] Luis Enrique Pinto PTA [AM] Luis Enrique Pinto PTA     General LE Assessment    ROM ankle, right: LE ROM deficit  -AM ankle, right: LE ROM deficit  -AM     ROM Detail ankle in splint  -AM ankle in splint  -AM     Recorded by [AM] Luis Enrique Pinto PTA [AM]  Luis Enrique Pinto, PTA     Mobility Assessment/Training    Extremity Weight-Bearing Status right lower extremity  -AM right lower extremity  -AM     Right Lower Extremity Weight-Bearing non weight-bearing  -AM non weight-bearing  -AM     Recorded by [AM] Luis Enrique Pinto, PTA [AM] Luis Enrique Pinto, PTA     Bed Mobility, Assessment/Treatment    Bed Mobility, Roll Left, [place] independent  -AM independent  -AM     Bed Mobility, Roll Right, [place] independent  -AM independent  -AM     Bed Mobility, Scoot/Bridge, [place] not tested  -AM not tested  -AM     Bed Mob, Supine to Sit, [place] independent  -AM independent  -AM independent  -EM    Bed Mob, Sit to Supine, [place] independent  -AM independent  -AM independent  -EM    Bed Mob, Sidelying to Sit, [place] not tested  -AM not tested  -AM     Bed Mob, Sit to Sidelying, [place] not tested  -AM not tested  -AM     Recorded by [AM] Luis Enrique Pinto, PTA [AM] Luis Enrique Pinto, PTA [EM] Efra Connor, PTA    Transfer Assessment/Treatment    Transfers, Bed-Chair [place] contact guard assist  -AM stand by assist;contact guard assist  -AM     Transfers, Chair-Bed [place] contact guard assist  -AM stand by assist;contact guard assist  -AM     Transfers, Bed-Chair-Bed, Assist Device other (see comments)   knee walker  -AM rolling walker  -AM     Transfers, Sit-Stand [place] contact guard assist  -AM stand by assist;contact guard assist  -AM independent  -EM    Transfers, Stand-Sit [place] contact guard assist  -AM stand by assist;contact guard assist  -AM independent  -EM    Transfers, Sit-Stand-Sit, Assist Device other (see comments)   knee walker  -AM rolling walker  -AM rolling walker  -EM    Toilet Transfer, [place] not tested  -AM not tested  -AM     Walk-In Shower Transfer, [place] not tested  -AM not tested  -AM     Bathtub Transfer, [place] not tested  -AM not tested  -AM     Transfer, Maintain Weight  Bearing Status able to maintain weight bearing status  -AM able to maintain weight bearing status;cues to maintain weight bearing status  -AM     Transfer, Safety Issues step length decreased  -AM step length decreased  -AM     Transfer, Impairments ROM decreased;strength decreased  -AM ROM decreased;strength decreased  -AM     Recorded by [AM] Luis Enrique Pinto PTA [AM] Luis Enrique Pinto PTA [EM] Efra Connor PTA    Gait Assessment/Treatment    Gait, [place] Level stand by assist;contact guard assist  -AM stand by assist;contact guard assist  -AM stand by assist  -EM    Gait, Assistive Device other (see comments)   knee walker  -AM rolling walker  -AM rolling walker  -EM    Gait, Distance (Feet) 200  -AM 60  -AM 50  -EM    Gait, Gait Pattern Analysis 3-point gait  -AM 3-point gait  -AM     Gait, Gait Deviations bilateral:;phyllis decreased  -AM bilateral:;phyllis decreased  -AM     Gait, Maintain Weight Bearing Status able to maintain weight bearing status  -AM able to maintain weight bearing status  -AM able to maintain weight bearing status  -EM    Gait, Safety Issues step length decreased  -AM step length decreased  -AM     Gait, Impairments ROM decreased;strength decreased  -AM ROM decreased;strength decreased  -AM     Recorded by [AM] Luis Enrique Pinto PTA [AM] Luis Enrique Pinto PTA [EM] Efra Connor PTA    Stairs Assessment/Treatment    Number of Stairs  3  -AM     Stairs, Handrail Location  both sides  -AM     Stairs, [place] Level not tested  -AM contact guard assist  -AM     Stairs, Technique Used  step to step (ascending);step to step (descending)  -AM     Stairs, Maintain Weight Bearing Status  able to maintain weight bearing status  -AM     Stairs, Impairments  ROM decreased;strength decreased  -AM     Recorded by [AM] Luis Enrique Pinto PTA [AM] Luis Enrique Pinto PTA     Therapy Exercises    Left Lower Extremity  --  -AM AROM:;20 reps  -EM    Bilateral Lower Extremities  --  -AM AROM:;20  reps;sitting;LAQ;hip flexion;glut sets  -EM    Recorded by  [AM] Luis Enrique Pinto PTA [EM] Efra Connor PTA    Positioning and Restraints    Pre-Treatment Position in bed  -AM in bed  -AM in bed  -EM    Post Treatment Position bed  -AM chair  -AM chair  -EM    In Bed supine;call light within reach;encouraged to call for assist;exit alarm on;legs elevated  -AM      In Chair  reclined;call light within reach;encouraged to call for assist;exit alarm on;legs elevated  -AM reclined;sitting;call light within reach;encouraged to call for assist  -EM    Recorded by [AM] Luis Enrique Pinto PTA [AM] Luis Enrique Pinto PTA [EM] Efra Connor PTA      12/09/17 1107 12/09/17 0911       Rehab Assessment/Intervention    Discipline physical therapy assistant  -EM occupational therapy assistant  -CS,PG,CS2     Document Type therapy note (daily note)  -EM therapy note (daily note)  -CS,PG,CS2     Subjective Information agree to therapy;complains of;weakness;pain  -EM agree to therapy;complains of;pain  -CS,PG,CS2     Precautions/Limitations fall precautions   NWB R LE  -EM fall precautions   non-weightbearing.   -CS,PG,CS2     Recorded by [EM] Efra Connor PTA [CS,PG,CS2] ESTELA Ryan/IVONNE (r) Luanne Weeks OT Student (t) ESTELA Ryan/L (c)     Vital Signs    Pre Systolic BP Rehab 168  -  -CS,PG,CS2     Pre Treatment Diastolic BP 86  -EM 52  -CS,PG,CS2     Post Systolic BP Rehab 179  -  -CS,PG,CS2     Post Treatment Diastolic BP 70  -EM 76  -CS,PG,CS2     Pretreatment Heart Rate (beats/min) 78  -EM 80  -CS,PG,CS2     Posttreatment Heart Rate (beats/min) 78  -EM 90  -CS,PG,CS2     Pre SpO2 (%) 93  -EM 92  -CS,PG,CS2     O2 Delivery Pre Treatment room air  -EM room air  -CS,PG,CS2     Post SpO2 (%) 96  -EM 92  -CS,PG,CS2     O2 Delivery Post Treatment supplemental O2  -EM room air  -CS,PG,CS2     Pre Patient Position Supine  -EM Supine  -CS,PG,CS2     Intra Patient Position  Standing  -CS,PG,CS2      Post Patient Position Sitting  -EM Supine  -CS,PG,CS2     Recorded by [EM] Efra Connor PTA [CS,PG,CS2] ESTELA Ryan/IVONNE (r) Luanne Weeks, OT Student (t) CHRIS Ryan (c)     Pain Assessment    Pain Assessment 0-10  -EM 0-10  -CS,PG,CS2     Pain Score 5  -EM 4  -CS,PG,CS2     Post Pain Score 5  -EM 2  -CS,PG,CS2     Pain Type Acute pain  -EM Acute pain  -CS,PG,CS2     Pain Location Ankle  -EM Ankle  -CS,PG,CS2     Pain Orientation Right  -EM Right  -CS,PG,CS2     Recorded by [EM] Efra Connor PTA [CS,PG,CS2] ESTELA Ryan/IVONNE (r) Luanne Weeks, CRYSTAL Student (t) ESTELA Ryan/L (c)     Vision Assessment/Intervention    Visual Impairment  WFL with corrective lenses  -CS,PG,CS2     Recorded by  [CS,PG,CS2] ESTELA Ryan/IVONNE (r) Luanne Weeks, CRYSTAL Student (t) ESTELA Ryan/L (c)     Cognitive Assessment/Intervention    Current Cognitive/Communication Assessment functional  -EM functional  -CS,PG,CS2     Orientation Status oriented x 4  -EM oriented x 4  -CS,PG,CS2     Follows Commands/Answers Questions 100% of the time  -% of the time  -CS,PG,CS2     Personal Safety WNL/WFL  -EM WNL/WFL  -CS,PG,CS2     Personal Safety Interventions nonskid shoes/slippers when out of bed;gait belt;fall prevention program maintained;supervised activity  -EM fall prevention program maintained  -CS,PG,CS2     Recorded by [EM] Efra Connor PTA [CS,PG,CS2] ESTELA Ryan/IVONNE (r) Luanne Weeks, CRYSTAL Student (t) ESTELA Ryan/L (c)     Bed Mobility, Assessment/Treatment    Bed Mobility, Assistive Device  bed rails;head of bed elevated  -CS,PG,CS2     Bed Mobility, Roll Right, [place]  conditional independence  -CS,PG,CS2     Bed Mobility, Scoot/Bridge, [place]  conditional independence  -CS,PG,CS2     Bed Mob, Supine to Sit, [place] supervision required  -EM conditional independence  -CS,PG,CS2     Bed Mob, Sit to Supine, [place]   conditional independence  -CS,PG,CS2     Bed Mob, Sidelying to Sit, [place]  conditional independence  -CS,PG,CS2     Bed Mob, Sit to Sidelying, [place]  minimum assist (75% patient effort)   had to help pt with LE  -CS,PG,CS2     Bed Mobility, Comment  --  -CS,PG,CS2     Recorded by [EM] Efra Connor PTA [CS,PG,CS2] ESTELA Ryan/IVONNE (r) Luanne Weeks, OT Student (t) CHRIS Ryan (c)     Transfer Assessment/Treatment    Transfers, Sit-Stand [place] contact guard assist  -EM contact guard assist  -CS,PG,CS2     Transfers, Stand-Sit [place] contact guard assist  -EM contact guard assist  -CS,PG,CS2     Transfers, Sit-Stand-Sit, Assist Device rolling walker  -EM rolling walker  -CS,PG,CS2     Transfer, Maintain Weight Bearing Status  unable to maintain weight bearing status  -CS,PG,CS2     Transfer, Safety Issues  sequencing ability decreased  -CS,PG,CS2     Transfer, Impairments  impaired balance;unable to maintain weight bearing status  -CS,PG,CS2     Transfer, Comment  --  -CS,PG,CS2     Recorded by [EM] Efra Connor PTA [CS,PG,CS2] ESTELA Ryan/IVONNE (r) Luanne Weeks, CRYSTAL Student (t) CHRIS Ryan (c)     Gait Assessment/Treatment    Gait, [place] Level contact guard assist  -EM      Gait, Assistive Device rolling walker  -EM      Gait, Distance (Feet) 32  -EM      Gait, Maintain Weight Bearing Status able to maintain weight bearing status;other (see comments)   pt req vc's for NWB RLE initially, but then was able to main  -EM      Recorded by [EM] Efra Connor PTA      Functional Mobility    Functional Mobility- Ind. Level  contact guard assist  -CS,PG,CS2     Functional Mobility- Device  rolling walker  -CS,PG,CS2     Functional Mobility-Distance (Feet)  15  -CS,PG,CS2     Functional Mobility-Maintain WBing  unable to maintain weight bearing status  -CS,PG,CS2     Recorded by  [CS,PG,CS2] ESTELA Ryan/IVONNE (r) Luanne Weeks, OT  Student (t) NATE RyanA/L (c)     Upper Body Bathing Assessment/Training    UB Bathing Assess/Train, Comment  deferred  -CS,PG,CS2     Recorded by  [CS,PG,CS2] NATE RyanA/IVONNE (r) Luanne Weeks, OT Student (t) NATE RyanA/L (c)     Lower Body Bathing Assessment/Training    LB Bathing Assess/Train, Comment  deferred  -CS,PG,CS2     Recorded by  [CS,PG,CS2] NATE RyanA/L (r) Luanne Weeks, OT Student (t) NATE RyanA/L (c)     Upper Body Dressing Assessment/Training    UB Dressing Assess/Train, Comment  deferred  -CS,PG,CS2     Recorded by  [CS,PG,CS2] NATE RyanA/L (r) Luanne Weeks, OT Student (t) NATE RyanA/L (c)     Lower Body Dressing Assessment/Training    LB Dressing Assess/Train, Comment  deferred  -CS,PG,CS2     Recorded by  [CS,PG,CS2] NATE RyanA/L (r) Luanne Weeks, OT Student (t) ESTELA Ryan/L (c)     Toileting Assessment/Training    Toileting Assess/Train, Comment  deferred  -CS,PG,CS2     Recorded by  [CS,PG,CS2] NATE RyanA/L (r) Luanne Weeks, OT Student (t) ESTELA Ryan/L (c)     Grooming Assessment/Training    Grooming Assess/Train, Comment  deferred  -CS,PG,CS2     Recorded by  [CS,PG,CS2] Elizabeth Pulliam PALMER/L (r) Luanne Weeks, OT Student (t) ESTELA Ryan/L (dae)     Therapy Exercises    Bilateral Upper Extremity  AROM:;20 reps;supine;elbow flexion/extension;shoulder extension/flexion;shoulder ER/IR;pronation/supination  -CS,PG,CS2     BUE Resistance  manual resistance- minimal  -CS,PG,CS2     Recorded by  [CS,PG,CS2] NATE RyanA/L (r) Luanne Weeks OT Student (t) ESTELA Ryan/IVONNE (c)     Positioning and Restraints    Pre-Treatment Position in bed  -EM in bed  -CS,PG,CS2     Post Treatment Position chair   eating lunch  -EM bed  -CS,PG,CS2     In Bed sitting;call light within reach;encouraged to call for assist;PRETTY elevated  -EM  supine;call light within reach  -CS,PG,CS2     Recorded by [EM] Efra Connor, PTA [CS,PG,CS2] ESTELA Rayn/IVONNE (r) Luanne Weeks, OT Student (t) CHRIS Ryan (c)       User Key  (r) = Recorded By, (t) = Taken By, (c) = Cosigned By    Initials Name Effective Dates    EM Efra Connor, PTA 08/11/15 -     AM Luis Enrique Pinto, PTA 10/17/16 -     CS CHRIS Ryan 10/17/16 -     PG Luanne Weeks, OT Student 01/31/17 -                 IP PT Goals       12/10/17 1142 12/08/17 1514 12/08/17 1027    Bed Mobility PT STG    Bed Mobility PT STG, Time to Achieve   2 days  -CB    Bed Mobility PT STG, Activity Type   supine to sit/sit to supine  -CB    Bed Mobility PT STG, [place] Level   independent  -CB    Bed Mobility PT STG, Additional Goal   HOB down and no rails  -CB    Bed Mobility PT STG, Date Goal Reviewed  12/08/17  -SS     Bed Mobility PT STG, Outcome goal met  -EM goal ongoing  -SS     Transfer Training PT STG    Transfer Training PT STG, Date Established   12/08/17  -CB    Transfer Training PT STG, Time to Achieve   2 days  -CB    Transfer Training PT STG, Activity Type   bed to chair /chair to bed;sit to stand/stand to sit  -CB    Transfer Training PT STG, [place] Level   supervision required  -CB    Transfer Training PT STG, Assist Device   walker, rolling  -CB    Transfer Training PT STG, Additional Goal   maintain NWB status  -CB    Transfer Training PT STG, Date Goal Reviewed  12/08/17  -SS     Transfer Training PT STG, Outcome goal met  -EM goal ongoing  -SS     Gait Training PT LTG    Gait Training Goal PT LTG, Date Established   12/08/17  -CB    Gait Training Goal PT LTG, Time to Achieve   2 days  -CB    Gait Training Goal PT LTG, [place] Level   contact guard assist  -CB    Gait Training Goal PT LTG, Assist Device   walker, rolling  -CB    Gait Training Goal PT LTG, Distance to Achieve   50  -CB    Gait Training Goal PT LTG, Additional Goal   NWB RLE  -CB     Gait Training Goal PT LTG, Date Goal Reviewed  12/08/17  -     Gait Training Goal PT LTG, Outcome goal met  -EM goal ongoing  -SS     Stair Training PT LTG    Stair Training Goal PT LTG, Date Established   12/08/17  -CB    Stair Training Goal PT LTG, Time to Achieve   by discharge  -CB    Stair Training Goal PT LTG, Number of Steps   3  -CB    Stair Training Goal PT LTG, [place] Level   minimum assist (75% patient effort)  -CB    Stair Training Goal PT LTG, Assist Device   1 handrail  -CB    Stair Training Goal PT LTG, Date Goal Reviewed  12/08/17  -     Stair Training Goal PT LTG, Outcome  goal ongoing  -       User Key  (r) = Recorded By, (t) = Taken By, (c) = Cosigned By    Initials Name Provider Type    EM Efra Connor, PTA Physical Therapy Assistant     Lisha Robledo, PT Physical Therapist    SS Nikki Gonzales, PTA Physical Therapy Assistant          Physical Therapy Education     Title: PT OT SLP Therapies (Active)     Topic: Physical Therapy (Active)     Point: Mobility training (Active)    Learning Progress Summary    Learner Readiness Method Response Comment Documented by Status   Patient Acceptance D NR   12/08/17 1410 Active               Point: Precautions (Active)    Learning Progress Summary    Learner Readiness Method Response Comment Documented by Status   Patient Acceptance D NR   12/08/17 1410 Active                      User Key     Initials Effective Dates Name Provider Type Discipline     04/06/17 -  Lisha Robledo, PT Physical Therapist PT                    PT Recommendation and Plan  Anticipated Equipment Needs At Discharge: front wheeled walker  Anticipated Discharge Disposition: skilled nursing facility (patient request)  Planned Therapy Interventions: bed mobility training, gait training, home exercise program, patient/family education, strengthening, transfer training, stair training  PT Frequency: per priority policy (5-14)             Outcome Measures        12/11/17 1345 12/11/17 1050 12/10/17 0952    How much help from another person do you currently need...    Turning from your back to your side while in flat bed without using bedrails? 4  -AM 4  -AM 4  -EM    Moving from lying on back to sitting on the side of a flat bed without bedrails? 4  -AM 4  -AM 4  -EM    Moving to and from a bed to a chair (including a wheelchair)? 3  -AM 3  -AM 4  -EM    Standing up from a chair using your arms (e.g., wheelchair, bedside chair)? 3  -AM 3  -AM 4  -EM    Climbing 3-5 steps with a railing? 3  -AM 3  -AM 2  -EM    To walk in hospital room? 3  -AM 3  -AM 3  -EM    AM-PAC 6 Clicks Score 20  -AM 20  -AM 21  -EM    Functional Assessment    Outcome Measure Options AM-PAC 6 Clicks Basic Mobility (PT)  -AM AM-PAC 6 Clicks Basic Mobility (PT)  -AM AM-PAC 6 Clicks Basic Mobility (PT)  -EM      12/09/17 1107 12/09/17 1100       How much help from another person do you currently need...    Turning from your back to your side while in flat bed without using bedrails? 4  -EM      Moving from lying on back to sitting on the side of a flat bed without bedrails? 4  -EM      Moving to and from a bed to a chair (including a wheelchair)? 3  -EM      Standing up from a chair using your arms (e.g., wheelchair, bedside chair)? 3  -EM      Climbing 3-5 steps with a railing? 2  -EM      To walk in hospital room? 3  -EM      AM-PAC 6 Clicks Score 19  -EM      How much help from another is currently needed...    Putting on and taking off regular lower body clothing?  2  -CS (r) PG (t) CS (c)     Bathing (including washing, rinsing, and drying)  2  -CS (r) PG (t) CS (c)     Toileting (which includes using toilet bed pan or urinal)  2  -CS (r) PG (t) CS (c)     Putting on and taking off regular upper body clothing  3  -CS (r) PG (t) CS (c)     Taking care of personal grooming (such as brushing teeth)  4  -CS (r) PG (t) CS (c)     Eating meals  4  -CS (r) PG (t) CS (c)     Score  17  -CS (r) PG (t)      Functional Assessment    Outcome Measure Options AM-PAC 6 Clicks Basic Mobility (PT)  -EM        User Key  (r) = Recorded By, (t) = Taken By, (c) = Cosigned By    Initials Name Provider Type    EM Efra Connor PTA Physical Therapy Assistant    AM Luis Enrique Pinto PTA Physical Therapy Assistant    BIGG Pulliam, ESTELA/IVONNE Occupational Therapy Assistant    AKIN Weeks, OT Student OT Student           Time Calculation:         PT Charges       12/11/17 1345 12/11/17 1050       Time Calculation    Start Time 1345  -AM 1050  -AM     Stop Time 1410  -AM 1115  -AM     Time Calculation (min) 25 min  -AM 25 min  -AM     PT Received On 12/11/17  -AM 12/11/17  -AM     PT - Next Appointment 12/12/17  -AM 12/11/17  -AM     Time Calculation- PT    Total Timed Code Minutes- PT 25 minute(s)  -AM 25 minute(s)  -AM       User Key  (r) = Recorded By, (t) = Taken By, (c) = Cosigned By    Initials Name Provider Type    AM Luis Enrique Pinto PTA Physical Therapy Assistant          Therapy Charges for Today     Code Description Service Date Service Provider Modifiers Qty    88323965745 HC GAIT TRAINING EA 15 MIN 12/11/2017 Luis Enrique Pinto PTA GP 1     Duration:  15m (11:00 AM - 11:15 AM)      20069709967 HC PT THER PROC EA 15 MIN 12/11/2017 Luis Enrique Pinto PTA GP 1     Duration:  10m (10:50 AM - 11:00 AM)      20864719018 HC GAIT TRAINING EA 15 MIN 12/11/2017 Luis Enrique Pinto PTA GP 1     Duration:  15m ( 1:55 PM -  2:10 PM)      82285991366 HC PT THER PROC EA 15 MIN 12/11/2017 Luis Enrique Pinto PTA GP 1     Duration:  10m ( 1:45 PM -  1:55 PM)            PT G-Codes  PT Professional Judgement Used?: Yes  Outcome Measure Options: AM-PAC 6 Clicks Basic Mobility (PT)  Score: 17  Functional Limitation: Mobility: Walking and moving around  Mobility: Walking and Moving Around Current Status (): At least 40 percent but less than 60 percent impaired, limited or restricted  Mobility: Walking and Moving Around Goal Status ():  At least 20 percent but less than 40 percent impaired, limited or restricted    Luis Enrique Pinto, PTA  12/11/2017

## 2017-12-11 NOTE — PROGRESS NOTES
Pain controlled  Vitals:    12/11/17 0342   BP: 150/67   Pulse: 65   Resp: 18   Temp: 98.6 °F (37 °C)   SpO2: 93%     Alert  Splint in place  Moving toes well  Brisk capillary refill  Sensation intact to touch  Physical therapy/OT  No weight bearing of the injured leg  -nursing facility transfer today

## 2017-12-12 VITALS
TEMPERATURE: 97 F | OXYGEN SATURATION: 94 % | DIASTOLIC BLOOD PRESSURE: 59 MMHG | RESPIRATION RATE: 18 BRPM | SYSTOLIC BLOOD PRESSURE: 115 MMHG | HEART RATE: 68 BPM | BODY MASS INDEX: 23.56 KG/M2 | WEIGHT: 141.4 LBS | HEIGHT: 65 IN

## 2017-12-12 PROCEDURE — 97110 THERAPEUTIC EXERCISES: CPT

## 2017-12-12 PROCEDURE — G0378 HOSPITAL OBSERVATION PER HR: HCPCS

## 2017-12-12 PROCEDURE — 94760 N-INVAS EAR/PLS OXIMETRY 1: CPT

## 2017-12-12 PROCEDURE — 97535 SELF CARE MNGMENT TRAINING: CPT

## 2017-12-12 RX ADMIN — HYDROCODONE BITARTRATE AND ACETAMINOPHEN 1 TABLET: 5; 325 TABLET ORAL at 10:28

## 2017-12-12 RX ADMIN — DOCUSATE SODIUM 100 MG: 100 CAPSULE, LIQUID FILLED ORAL at 10:00

## 2017-12-12 NOTE — THERAPY DISCHARGE NOTE
Acute Care - Occupational Therapy Discharge Summary  [facility]     Patient Name: Rebecca Zaragoza  : 1935  MRN: [id number]    Today's Date: 2017  Onset of Illness/Injury or Date of Surgery Date: 17    Date of Referral to OT: 17  Referring Physician: Dr Ibrahim      Admit Date: 2017        OT Recommendation and Plan    Visit Dx:    ICD-10-CM ICD-9-CM   1. Closed bimalleolar fracture of right ankle, initial encounter S82.841A 824.4   2. Impaired mobility and activities of daily living Z74.09 799.89   3. Impaired physical mobility Z74.09 781.99               Time Calculation- OT       17 1155          Time Calculation- OT    OT Start Time 0833  -CS      OT Stop Time 0947  -CS      OT Time Calculation (min) 74 min  -CS      Total Timed Code Minutes- OT 74 minute(s)  -CS      OT Received On 17  -        User Key  (r) = Recorded By, (t) = Taken By, (c) = Cosigned By    Initials Name Provider Type    CHRIS Roman Occupational Therapy Assistant                  OT Goals       17 1535 17 1134 17 1152    Transfer Training OT LTG    Transfer Training OT LTG, Date Goal Reviewed  17  -CS (r) PG (t) CS (c) 17  -CS (r) PG (t) CS (c)    Transfer Training OT LTG, Outcome goal not met  -      Transfer Training OT LTG, Reason Goal Not Met discharged from facility  -      Static Standing Balance OT LTG    Static Standing Balance OT LTG, Date Goal Reviewed 17  - 17  -CS (r) PG (t) CS (c) 17  -CS (r) PG (t) CS (c)    Static Standing Balance OT LTG, Outcome goal not met  -      Static Standing Balance OT LTG, Reason Goal Not Met discharged from facility  -      Patient Education OT LTG    Patient Education OT LTG, Date Goal Reviewed   17  -CS (r) PG (t) CS (c)    Patient Education OT LTG Outcome  goal partially met  -CS (r) PG (t) CS (c)     Patient Education OT LTG, Reason Goal Not Met discharged from  facility  -      ADL OT LTG    ADL OT LTG, Date Goal Reviewed 12/12/17  - 12/12/17  -CS (r) PG (t) CS (c) 12/09/17  -CS (r) PG (t) CS (c)    ADL OT LTG, Outcome goal not met  -      ADL OT LTG, Reason Goal Not Met discharged from facility  -        12/08/17 1151          Transfer Training OT LTG    Transfer Training OT LTG, Date Established 12/08/17  -      Transfer Training OT LTG, Time to Achieve by discharge  -RB      Transfer Training OT LTG, Activity Type all transfers  -RB      Transfer Training OT LTG, [place] Level supervision required;contact guard assist  -RB      Transfer Training OT LTG, Assist Device walker, rolling  -RB      Static Standing Balance OT LTG    Static Standing Balance OT LTG, Date Established 12/08/17  -      Static Standing Balance OT LTG, Time to Achieve by discharge  -RB      Static Standing Balance OT LTG, [place] Level supervision required   5 minutes with functional activity.  -RB      Static Standing Balance OT LTG, Assist Device assistive device   R/W.  -RB      Patient Education OT LTG    Patient Education OT LTG, Date Established 12/08/17  -      Patient Education OT LTG, Time to Achieve by discharge  -RB      Patient Education OT LTG, Education Type HEP;home safety;adaptive equipment mgmt;energy conservation;work simplification  -      Patient Education OT LTG, Education Understanding demonstrates adequately;verbalizes understanding  -RB      ADL OT LTG    ADL OT LTG, Date Established 12/08/17  -      ADL OT LTG, Time to Achieve by discharge  -RB      ADL OT LTG, Activity Type ADL skills   Sponge bath and dress or walk-in shower.  -RB      ADL OT LTG, [place] Level standby assist;contact guard   R/W.  -RB        User Key  (r) = Recorded By, (t) = Taken By, (c) = Cosigned By    Initials Name Provider Type    CELESTINO Jones, OT Occupational Therapist     Edel Kasper, OTR/L Occupational Therapist     ESTELA Ryan/IVONNE Occupational  Therapy Assistant    AKIN Weeks, OT Student OT Student                Outcome Measures       12/12/17 1100 12/11/17 1345 12/11/17 1050    How much help from another person do you currently need...    Turning from your back to your side while in flat bed without using bedrails?  4  -AM 4  -AM    Moving from lying on back to sitting on the side of a flat bed without bedrails?  4  -AM 4  -AM    Moving to and from a bed to a chair (including a wheelchair)?  3  -AM 3  -AM    Standing up from a chair using your arms (e.g., wheelchair, bedside chair)?  3  -AM 3  -AM    Climbing 3-5 steps with a railing?  3  -AM 3  -AM    To walk in hospital room?  3  -AM 3  -AM    AM-PAC 6 Clicks Score  20  -AM 20  -AM    How much help from another is currently needed...    Putting on and taking off regular lower body clothing? 2  -CS (r) PG (t) CS (c)      Bathing (including washing, rinsing, and drying) 2  -CS (r) PG (t) CS (c)      Toileting (which includes using toilet bed pan or urinal) 2  -CS (r) PG (t) CS (c)      Putting on and taking off regular upper body clothing 3  -CS (r) PG (t) CS (c)      Taking care of personal grooming (such as brushing teeth) 4  -CS (r) PG (t) CS (c)      Eating meals 4  -CS (r) PG (t) CS (c)      Score 17  -CS (r) PG (t)      Functional Assessment    Outcome Measure Options  AM-PAC 6 Clicks Basic Mobility (PT)  -AM AM-PAC 6 Clicks Basic Mobility (PT)  -AM      12/10/17 0952          How much help from another person do you currently need...    Turning from your back to your side while in flat bed without using bedrails? 4  -EM      Moving from lying on back to sitting on the side of a flat bed without bedrails? 4  -EM      Moving to and from a bed to a chair (including a wheelchair)? 4  -EM      Standing up from a chair using your arms (e.g., wheelchair, bedside chair)? 4  -EM      Climbing 3-5 steps with a railing? 2  -EM      To walk in hospital room? 3  -EM      AM-PAC 6 Clicks Score 21  -EM       Functional Assessment    Outcome Measure Options AM-PAC 6 Clicks Basic Mobility (PT)  -EM        User Key  (r) = Recorded By, (t) = Taken By, (c) = Cosigned By    Initials Name Provider Type    EM Efra Connor, PTA Physical Therapy Assistant    AM Luis Enrique Pinto, JOSE F Physical Therapy Assistant    ESTELA Roman/IVONNE Occupational Therapy Assistant    AKIN Weeks, OT Student OT Student              OT Discharge Summary  Anticipated Discharge Disposition: skilled nursing facility  Reason for Discharge: Discharge from facility, Per MD order  Outcomes Achieved: Refer to plan of care for updates on goals achieved  Discharge Destination: SNF      Edel Kasper OTR/L  12/12/2017

## 2017-12-12 NOTE — THERAPY DISCHARGE NOTE
Acute Care - Physical Therapy Discharge Summary  [facility]       Patient Name: Rebecca Zaragoza  : 1935  MRN: [id number]    Today's Date: 2017  Onset of Illness/Injury or Date of Surgery Date: 17    Date of Referral to PT: 17  Referring Physician: Dr Ibrahim      Admit Date: 2017      PT Recommendation and Plan    Visit Dx:    ICD-10-CM ICD-9-CM   1. Closed bimalleolar fracture of right ankle, initial encounter S82.841A 824.4   2. Impaired mobility and activities of daily living Z74.09 799.89   3. Impaired physical mobility Z74.09 781.99             Outcome Measures       17 1100 17 1345 17 1050    How much help from another person do you currently need...    Turning from your back to your side while in flat bed without using bedrails?  4  -AM 4  -AM    Moving from lying on back to sitting on the side of a flat bed without bedrails?  4  -AM 4  -AM    Moving to and from a bed to a chair (including a wheelchair)?  3  -AM 3  -AM    Standing up from a chair using your arms (e.g., wheelchair, bedside chair)?  3  -AM 3  -AM    Climbing 3-5 steps with a railing?  3  -AM 3  -AM    To walk in hospital room?  3  -AM 3  -AM    AM-PAC 6 Clicks Score  20  -AM 20  -AM    How much help from another is currently needed...    Putting on and taking off regular lower body clothing? 2  -CS (r) PG (t) CS (c)      Bathing (including washing, rinsing, and drying) 2  -CS (r) PG (t) CS (c)      Toileting (which includes using toilet bed pan or urinal) 2  -CS (r) PG (t) CS (c)      Putting on and taking off regular upper body clothing 3  -CS (r) PG (t) CS (c)      Taking care of personal grooming (such as brushing teeth) 4  -CS (r) PG (t) CS (c)      Eating meals 4  -CS (r) PG (t) CS (c)      Score 17  -CS (r) PG (t)      Functional Assessment    Outcome Measure Options  AM-PAC 6 Clicks Basic Mobility (PT)  -AM AM-PAC 6 Clicks Basic Mobility (PT)  -AM      12/10/17 0952          How  much help from another person do you currently need...    Turning from your back to your side while in flat bed without using bedrails? 4  -EM      Moving from lying on back to sitting on the side of a flat bed without bedrails? 4  -EM      Moving to and from a bed to a chair (including a wheelchair)? 4  -EM      Standing up from a chair using your arms (e.g., wheelchair, bedside chair)? 4  -EM      Climbing 3-5 steps with a railing? 2  -EM      To walk in hospital room? 3  -EM      AM-PAC 6 Clicks Score 21  -EM      Functional Assessment    Outcome Measure Options AM-PAC 6 Clicks Basic Mobility (PT)  -EM        User Key  (r) = Recorded By, (t) = Taken By, (c) = Cosigned By    Initials Name Provider Type    EM Efra Connor, PTA Physical Therapy Assistant    DESTINY Pinto PTA Physical Therapy Assistant    ESTELA Roman/IVONNE Occupational Therapy Assistant    AKIN Weeks, OT Student OT Student                PT Charges       12/12/17 1030          Time Calculation    PT - Next Appointment 12/12/17  -AM        User Key  (r) = Recorded By, (t) = Taken By, (c) = Cosigned By    Initials Name Provider Type    DESTINY Pinto PTA Physical Therapy Assistant                  IP PT Goals       12/10/17 1142 12/08/17 1514 12/08/17 1027    Bed Mobility PT STG    Bed Mobility PT STG, Time to Achieve   2 days  -CB    Bed Mobility PT STG, Activity Type   supine to sit/sit to supine  -CB    Bed Mobility PT STG, [place] Level   independent  -CB    Bed Mobility PT STG, Additional Goal   HOB down and no rails  -CB    Bed Mobility PT STG, Date Goal Reviewed  12/08/17  -SS     Bed Mobility PT STG, Outcome goal met  -EM goal ongoing  -SS     Transfer Training PT STG    Transfer Training PT STG, Date Established   12/08/17  -CB    Transfer Training PT STG, Time to Achieve   2 days  -CB    Transfer Training PT STG, Activity Type   bed to chair /chair to bed;sit to stand/stand to sit  -CB    Transfer Training  PT STG, [place] Level   supervision required  -CB    Transfer Training PT STG, Assist Device   walker, rolling  -CB    Transfer Training PT STG, Additional Goal   maintain NWB status  -CB    Transfer Training PT STG, Date Goal Reviewed  12/08/17  -SS     Transfer Training PT STG, Outcome goal met  -EM goal ongoing  -SS     Gait Training PT LTG    Gait Training Goal PT LTG, Date Established   12/08/17  -CB    Gait Training Goal PT LTG, Time to Achieve   2 days  -CB    Gait Training Goal PT LTG, [place] Level   contact guard assist  -CB    Gait Training Goal PT LTG, Assist Device   walker, rolling  -CB    Gait Training Goal PT LTG, Distance to Achieve   50  -CB    Gait Training Goal PT LTG, Additional Goal   NWB RLE  -CB    Gait Training Goal PT LTG, Date Goal Reviewed  12/08/17  -SS     Gait Training Goal PT LTG, Outcome goal met  -EM goal ongoing  -SS     Stair Training PT LTG    Stair Training Goal PT LTG, Date Established   12/08/17  -CB    Stair Training Goal PT LTG, Time to Achieve   by discharge  -CB    Stair Training Goal PT LTG, Number of Steps   3  -CB    Stair Training Goal PT LTG, [place] Level   minimum assist (75% patient effort)  -CB    Stair Training Goal PT LTG, Assist Device   1 handrail  -CB    Stair Training Goal PT LTG, Date Goal Reviewed  12/08/17  -SS     Stair Training Goal PT LTG, Outcome  goal ongoing  -       User Key  (r) = Recorded By, (t) = Taken By, (c) = Cosigned By    Initials Name Provider Type    EM Efra Connor PTA Physical Therapy Assistant    CB Lisha Robledo, PT Physical Therapist    SS Nikki Gonzales PTA Physical Therapy Assistant          Therapy Charges for Today     Code Description Service Date Service Provider Modifiers Qty    33476262049 HC GAIT TRAINING EA 15 MIN 12/11/2017 Luis Enrique Pinto PTA GP 1     Duration:  15m (11:00 AM - 11:15 AM)      [id number] HC PT THER PROC EA 15 MIN 12/11/2017 Luis Enrique Pinto PTA GP 1     Duration:  10m (10:50 AM  - 11:00 AM)      [id number]  GAIT TRAINING EA 15 MIN 12/11/2017 Luis Enrique Pinto PTA GP 1     Duration:  15m ( 1:55 PM -  2:10 PM)      [id number] HC PT THER PROC EA 15 MIN 12/11/2017 Luis Enrique Pinto PTA GP 1     Duration:  10m ( 1:45 PM -  1:55 PM)            PT Discharge Summary  Anticipated Discharge Disposition: skilled nursing facility  Reason for Discharge: Discharge from facility, Per MD order  Outcomes Achieved: Patient able to partially acheive established goals  Discharge Destination: SNF      Luis Enrique Pinto PTA   12/12/2017

## 2017-12-12 NOTE — PLAN OF CARE
Problem: Patient Care Overview (Adult)  Goal: Plan of Care Review  Outcome: Ongoing (interventions implemented as appropriate)    12/12/17 0335   Coping/Psychosocial Response Interventions   Plan Of Care Reviewed With patient   Patient Care Overview   Progress no change   Outcome Evaluation   Outcome Summary/Follow up Plan Patient VSS, pain controlled with PO pain meds, no changes       Goal: Adult Individualization and Mutuality  Outcome: Ongoing (interventions implemented as appropriate)  Goal: Discharge Needs Assessment  Outcome: Ongoing (interventions implemented as appropriate)    Problem: Orthopaedic Fracture (Adult)  Goal: Signs and Symptoms of Listed Potential Problems Will be Absent or Manageable (Orthopaedic Fracture)  Outcome: Ongoing (interventions implemented as appropriate)

## 2017-12-12 NOTE — PLAN OF CARE
Problem: Inpatient Occupational Therapy  Goal: Transfer Training Goal 1 LTG- OT  Outcome: Unable to achieve outcome(s) by discharge Date Met:  12/12/17 12/08/17 1151 12/12/17 1134 12/12/17 1535   Transfer Training OT LTG   Transfer Training OT LTG, Date Established 12/08/17 --  --    Transfer Training OT LTG, Time to Achieve by discharge --  --    Transfer Training OT LTG, Activity Type all transfers --  --    Transfer Training OT LTG, [place] Level supervision required;contact guard assist --  --    Transfer Training OT LTG, Assist Device walker, rolling --  --    Transfer Training OT LTG, Date Goal Reviewed --  12/12/17 --    Transfer Training OT LTG, Outcome --  --  goal not met   Transfer Training OT LTG, Reason Goal Not Met --  --  discharged from facility       Goal: Static Standing Balance Goal LTG- OT  Outcome: Unable to achieve outcome(s) by discharge Date Met:  12/12/17 12/08/17 1151 12/12/17 1535   Static Standing Balance OT LTG   Static Standing Balance OT LTG, Date Established 12/08/17 --    Static Standing Balance OT LTG, Time to Achieve by discharge --    Static Standing Balance OT LTG, [place] Level supervision required  (5 minutes with functional activity.) --    Static Standing Balance OT LTG, Assist Device assistive device  (R/W.) --    Static Standing Balance OT LTG, Date Goal Reviewed --  12/12/17   Static Standing Balance OT LTG, Outcome --  goal not met   Static Standing Balance OT LTG, Reason Goal Not Met --  discharged from facility       Goal: Patient Education Goal LTG- OT  Outcome: Unable to achieve outcome(s) by discharge Date Met:  12/12/17 12/08/17 1151 12/09/17 1152 12/12/17 1134   Patient Education OT LTG   Patient Education OT LTG, Date Established 12/08/17 --  --    Patient Education OT LTG, Time to Achieve by discharge --  --    Patient Education OT LTG, Education Type HEP;home safety;adaptive equipment mgmt;energy conservation;work simplification --  --     Patient Education OT LTG, Education Understanding demonstrates adequately;verbalizes understanding --  --    Patient Education OT LTG, Date Goal Reviewed --  12/09/17 --    Patient Education OT LTG Outcome --  --  goal partially met   Patient Education OT LTG, Reason Goal Not Met --  --  --      12/12/17 1535   Patient Education OT LTG   Patient Education OT LTG, Date Established --    Patient Education OT LTG, Time to Achieve --    Patient Education OT LTG, Education Type --    Patient Education OT LTG, Education Understanding --    Patient Education OT LTG, Date Goal Reviewed --    Patient Education OT LTG Outcome --    Patient Education OT LTG, Reason Goal Not Met discharged from facility       Goal: ADL Goal LTG- OT  Outcome: Unable to achieve outcome(s) by discharge Date Met:  12/12/17 12/08/17 1151 12/12/17 1535   ADL OT LTG   ADL OT LTG, Date Established 12/08/17 --    ADL OT LTG, Time to Achieve by discharge --    ADL OT LTG, Activity Type ADL skills  (Sponge bath and dress or walk-in shower.) --    ADL OT LTG, [place] Level standby assist;contact guard  (R/W.) --    ADL OT LTG, Date Goal Reviewed --  12/12/17   ADL OT LTG, Outcome --  goal not met   ADL OT LTG, Reason Goal Not Met --  discharged from facility

## 2017-12-12 NOTE — THERAPY TREATMENT NOTE
Acute Care - Occupational Therapy Treatment Note  [facility]     Patient Name: Rebecca Zaragoza  : 1935  MRN: [id number]  Today's Date: 2017  Onset of Illness/Injury or Date of Surgery Date: 17  Date of Referral to OT: 17  Referring Physician: Dr Ibrahim      Admit Date: 2017    Visit Dx:     ICD-10-CM ICD-9-CM   1. Closed bimalleolar fracture of right ankle, initial encounter S82.841A 824.4   2. Impaired mobility and activities of daily living Z74.09 799.89   3. Impaired physical mobility Z74.09 781.99     Patient Active Problem List   Diagnosis   • Ankle fracture, bimalleolar, closed   • Fracture of ankle, trimalleolar, closed, right, initial encounter   • Closed bimalleolar fracture of right ankle             Adult Rehabilitation Note       17 0833 17 1345 17 1050    Rehab Assessment/Intervention    Discipline occupational therapy assistant  -CS,PG,CS2 physical therapy assistant  -AM physical therapy assistant  -AM    Document Type therapy note (daily note)  -CS,PG,CS2 therapy note (daily note)  -AM therapy note (daily note)  -AM    Subjective Information agree to therapy;complains of;pain  -CS,PG,CS2 agree to therapy;no complaints  -AM agree to therapy;no complaints  -AM    Patient Effort, Rehab Treatment good  -CS,PG,CS2 good  -AM good  -AM    Symptoms Noted During/After Treatment  fatigue  -AM fatigue  -AM    Precautions/Limitations fall precautions;non-weight bearing status  -CS,PG,CS2 fall precautions;non-weight bearing status  -AM fall precautions;non-weight bearing status  -AM    Equipment Issued to Patient gait belt  -CS,PG,CS2 gait belt  -AM gait belt  -AM    Recorded by [CS,PG,CS2] ESTELA Ryan/IVONNE (r) Luanne Weeks OT Student (t) ESTELA Ryan/IVONNE (c) [AM] Luis Enrique Pinto, PTA [AM] Luis Enrique Pinto, PTA    Vital Signs    Pre Systolic BP Rehab 114  -CS,PG,CS2 128  -  -AM    Pre Treatment Diastolic BP 58  -CS,PG,CS2 83  -AM  62  -AM    Post Systolic BP Rehab 97  -CS,PG,CS2 113  -  -AM    Post Treatment Diastolic BP 55  -CS,PG,CS2 61  -AM 66  -AM    Pretreatment Heart Rate (beats/min) 72  -CS,PG,CS2 86  -AM 67  -AM    Posttreatment Heart Rate (beats/min) 82  -CS,PG,CS2 79  -AM 71  -AM    Pre SpO2 (%) 91  -CS,PG,CS2 93  -AM 93  -AM    O2 Delivery Pre Treatment room air  -CS,PG,CS2 room air  -AM room air  -AM    Post SpO2 (%) 90  -CS,PG,CS2 95  -AM 96  -AM    O2 Delivery Post Treatment room air  -CS,PG,CS2 room air  -AM room air  -AM    Pre Patient Position Supine  -CS,PG,CS2 Supine  -AM Supine  -AM    Intra Patient Position Standing  -CS,PG,CS2 Standing  -AM Standing  -AM    Post Patient Position Supine  -CS,PG,CS2 Supine  -AM Sitting  -AM    Recorded by [CS,PG,CS2] ESTELA Ryan/IVONNE (r) Luanne Weeks, OT Student (t) ESTELA Ryan/IVONNE (c) [AM] Luis Enrique Pinto PTA [AM] Luis Enrique Pinto PTA    Pain Assessment    Pain Assessment 0-10  -CS,PG,CS2 No/denies pain  -AM No/denies pain  -AM    Pain Score 5  -CS,PG,CS2      Post Pain Score 5  -CS,PG,CS2      Pain Location Foot  -CS,PG,CS2      Pain Orientation Right  -CS,PG,CS2      Recorded by [CS,PG,CS2] ESTELA Ryan/IVONNE (r) Luanne Weeks, OT Student (t) ESTELA Ryan/IVONNE (c) [AM] Luis Enrique Pinto PTA [AM] Luis Enrique Pinto PTA    Cognitive Assessment/Intervention    Current Cognitive/Communication Assessment functional  -CS,PG,CS2 functional  -AM functional  -AM    Orientation Status oriented x 4  -CS,PG,CS2 oriented x 4  -AM oriented x 4  -AM    Follows Commands/Answers Questions 100% of the time  -CS,PG,CS2 100% of the time  -% of the time  -AM    Personal Safety WNL/WFL  -CS,PG,CS2      Personal Safety Interventions  gait belt;nonskid shoes/slippers when out of bed;supervised activity  -AM gait belt;nonskid shoes/slippers when out of bed;supervised activity  -AM    Recorded by [CS,PG,CS2] ESTELA Ryan/IVONNE (sue) Luanne Weeks OT Student  (t) CHRIS Ryan (c) [AM] Luis Enrique Pinto PTA [AM] Luis Enrique Pinto PTA    ROM (Range of Motion)    General ROM  lower extremity range of motion deficits identified  -AM lower extremity range of motion deficits identified  -AM    Recorded by  [AM] Luis Enrique Pinto PTA [AM] Luis Enrique Pinto PTA    General LE Assessment    ROM  ankle, right: LE ROM deficit  -AM ankle, right: LE ROM deficit  -AM    ROM Detail  ankle in splint  -AM ankle in splint  -AM    Recorded by  [AM] Luis Enrique Pinto PTA [AM] Luis Enrique Pinto PTA    Mobility Assessment/Training    Extremity Weight-Bearing Status  right lower extremity  -AM right lower extremity  -AM    Right Lower Extremity Weight-Bearing  non weight-bearing  -AM non weight-bearing  -AM    Recorded by  [AM] Luis Enrique Pinto PTA [AM] Luis Enrique Pinto PTA    Bed Mobility, Assessment/Treatment    Bed Mobility, Roll Left, [place] independent  -CS,PG,CS2 independent  -AM independent  -AM    Bed Mobility, Roll Right, [place] independent  -CS,PG,CS2 independent  -AM independent  -AM    Bed Mobility, Scoot/Bridge, [place]  not tested  -AM not tested  -AM    Bed Mob, Supine to Sit, [place] independent  -CS,PG,CS2 independent  -AM independent  -AM    Bed Mob, Sit to Supine, [place] independent  -CS,PG,CS2 independent  -AM independent  -AM    Bed Mob, Sidelying to Sit, [place]  not tested  -AM not tested  -AM    Bed Mob, Sit to Sidelying, [place]  not tested  -AM not tested  -AM    Bed Mobility, Impairments ROM decreased;strength decreased  -CS,PG,CS2      Recorded by [CS,PG,CS2] ESTELA Ryan/IVONNE (r) Luanne Weeks OT Student (t) CHRSI Ryan (c) [AM] Luis Enrique Pinto PTA [AM] Luis Enrique Pinto PTA    Transfer Assessment/Treatment    Transfers, Bed-Chair [place]  contact guard assist  -AM stand by assist;contact guard assist  -AM    Transfers, Chair-Bed [place]  contact guard assist  -AM stand by assist;contact  guard assist  -AM    Transfers, Bed-Chair-Bed, Assist Device  other (see comments)   knee walker  -AM rolling walker  -AM    Transfers, Sit-Stand [place] stand by assist  -CS contact guard assist  -AM stand by assist;contact guard assist  -AM    Transfers, Stand-Sit [place] contact guard assist  -CS contact guard assist  -AM stand by assist;contact guard assist  -AM    Transfers, Sit-Stand-Sit, Assist Device  other (see comments)   knee walker  -AM rolling walker  -AM    Toilet Transfer, [place] supervision required;contact guard assist  -CS not tested  -AM not tested  -AM    Toilet Transfer, Assistive Device rolling walker  -CS2,PG,CS3      Walk-In Shower Transfer, [place]  not tested  -AM not tested  -AM    Bathtub Transfer, [place]  not tested  -AM not tested  -AM    Transfer, Maintain Weight Bearing Status  able to maintain weight bearing status  -AM able to maintain weight bearing status;cues to maintain weight bearing status  -AM    Transfer, Safety Issues  step length decreased  -AM step length decreased  -AM    Transfer, Impairments  ROM decreased;strength decreased  -AM ROM decreased;strength decreased  -AM    Recorded by [CS] CHRIS Ryan  [CS2,PG,CS3] CHRIS Ryan (r) Luanne Weeks OT Student (t) CHRIS Ryan (c) [AM] Luis Enrique Pinto PTA [AM] Luis Enrique Pinto PTA    Gait Assessment/Treatment    Gait, [place] Level  stand by assist;contact guard assist  -AM stand by assist;contact guard assist  -AM    Gait, Assistive Device  other (see comments)   knee walker  -AM rolling walker  -AM    Gait, Distance (Feet)  200  -AM 60  -AM    Gait, Gait Pattern Analysis  3-point gait  -AM 3-point gait  -AM    Gait, Gait Deviations  bilateral:;phyllis decreased  -AM bilateral:;phyllis decreased  -AM    Gait, Maintain Weight Bearing Status  able to maintain weight bearing status  -AM able to maintain weight bearing status  -AM    Gait, Safety Issues   step length decreased  -AM step length decreased  -AM    Gait, Impairments  ROM decreased;strength decreased  -AM ROM decreased;strength decreased  -AM    Recorded by  [AM] Luis Enrique Pinto PTA [AM] Luis Enrique Pinto PTA    Stairs Assessment/Treatment    Number of Stairs   3  -AM    Stairs, Handrail Location   both sides  -AM    Stairs, [place] Level  not tested  -AM contact guard assist  -AM    Stairs, Technique Used   step to step (ascending);step to step (descending)  -AM    Stairs, Maintain Weight Bearing Status   able to maintain weight bearing status  -AM    Stairs, Impairments   ROM decreased;strength decreased  -AM    Recorded by  [AM] Luis Enrique Pinto PTA [AM] Luis Enrique Pinto PTA    Functional Mobility    Functional Mobility- Ind. Level supervision required;contact guard assist  -CS      Functional Mobility- Device rolling walker  -CS2,PG,CS3      Functional Mobility-Distance (Feet) 10  -CS2,PG,CS3      Functional Mobility-Maintain WBing able to maintain weight bearing status  -CS2,PG,CS3      Functional Mobility- Safety Issues weight-shifting ability decreased  -CS2,PG,CS3      Recorded by [CS] ESTELA Ryan/L  [CS2,PG,CS3] ESTELA Ryan/IVONNE (r) Luanne Weeks, OT Student (t) CHRIS Ryan (c)      Upper Body Bathing Assessment/Training    UB Bathing Assess/Train Assistive Device bath mitt  -CS,PG,CS2      UB Bathing Assess/Train, Position sitting  -CS,PG,CS2      UB Bathing Assess/Train, [place] Level set up required;supervision required  -CS,PG,CS2      UB Bathing Assess/Train, Impairments ROM decreased;strength decreased  -CS,PG,CS2      Recorded by [CS,PG,CS2] CHRIS Ryan (r) Luanne Weeks, OT Student (t) CHRIS Ryan (c)      Lower Body Bathing Assessment/Training    LB Bathing Assess/Train Assistive Device bath mitt  -CS,PG,CS2      LB Bathing Assess/Train, Position sitting;standing  -CS,PG,CS2      LB Bathing Assess/Train,  [place] Level set up required;supervision required;stand by assist  -CS,PG,CS2      LB Bathing Assess/Train, Impairments ROM decreased;strength decreased  -CS,PG,CS2      Recorded by [CS,PG,CS2] ESTELA Ryan/IVONNE (r) Luanne Weeks, OT Student (t) CHRIS Ryan (c)      Upper Body Dressing Assessment/Training    UB Dressing Assess/Train, Clothing Type donning:;doffing:;hospital gown  -CS,PG,CS2      UB Dressing Assess/Train, Position sitting  -CS,PG,CS2      UB Dressing Assess/Train, [place] set up required;supervision required;minimum assist (75% patient effort)   min A to tie gown   -CS,PG,CS2      UB Dressing Assess/Train, Impairments strength decreased  -CS,PG,CS2      Recorded by [CS,PG,CS2] ESTELA Ryan/IVONNE (r) Luanne Weeks, OT Student (t) ESTELA Ryan/L (c)      Lower Body Dressing Assessment/Training    LB Dressing Assess/Train, Clothing Type donning:   underwear  -CS,PG,CS2      LB Dressing Assess/Train, Assist Device --   underwear  -CS,PG,CS2      LB Dressing Assess/Train, Position sitting  -CS,PG,CS2      LB Dressing Assess/Train, [place] minimum assist (75% patient effort)  -CS,PG,CS2      LB Dressing Assess/Train, Impairments ROM decreased;strength decreased  -CS,PG,CS2      LB Dressing Assess/Train, Comment Pt required min A to get underwear over cast on LLE  -CS,PG,CS2      Recorded by [CS,PG,CS2] ESTELA Ryan/IVONNE (r) Luanne Weeks, OT Student (t) CHRIS Ryan (c)      Toileting Assessment/Training    Toileting Assess/Train, Assistive Device raised toilet seat  -CS,PG,CS2      Toileting Assess/Train, Position sitting  -CS,PG,CS2      Toileting Assess/Train, [place] Level supervision required  -CS,PG,CS2      Toileting Assess/Train, Impairments strength decreased;ROM decreased  -CS,PG,CS2      Recorded by [CS,PG,CS2] ESTELA Ryan/IVONNE (r) Luanne Weeks, OT Student (t) ESTEAL Ryan/IVONNE (c)      Grooming  Assessment/Training    Grooming Assess/Train, Position sink side  -CS,PG,CS2      Grooming Assess/Train, [place] Level set up required;supervision required  -CS,PG,CS2      Grooming Assess/Train, Impairments ROM decreased;strength decreased  -CS,PG,CS2      Grooming Assess/Train, Comment brush teeth  -CS,PG,CS2      Recorded by [CS,PG,CS2] CHRIS Ryan (r) Luanne Weeks, OT Student (t) CHRIS Ryan (c)      Therapy Exercises    Left Lower Extremity   --  -AM    Bilateral Lower Extremities   --  -AM    Bilateral Upper Extremity AROM:;20 reps;sitting;elbow flexion/extension;shoulder extension/flexion;shoulder ER/IR  -CS,PG,CS2      BUE Resistance manual resistance- minimal  -CS,PG,CS2      Recorded by [CS,PG,CS2] CHRIS Ryan (r) Luanne Weeks, OT Student (t) CHRIS Ryan (c)  [AM] Luis Enrique Pinto PTA    Positioning and Restraints    Pre-Treatment Position in bed  -CS,PG,CS2 in bed  -AM in bed  -AM    Post Treatment Position bed  -CS,PG,CS2 bed  -AM chair  -AM    In Bed supine;call light within reach  -CS,PG,CS2 supine;call light within reach;encouraged to call for assist;exit alarm on;legs elevated  -AM     In Chair   reclined;call light within reach;encouraged to call for assist;exit alarm on;legs elevated  -AM    Recorded by [CS,PG,CS2] CHRIS Ryan (r) Luanne Weeks, OT Student (t) CHRIS Ryan (c) [AM] Luis Enrique Pinto PTA [AM] Luis Enrique Pinto PTA      12/10/17 0952          Rehab Assessment/Intervention    Discipline physical therapy assistant  -EM      Document Type therapy note (daily note)  -EM      Subjective Information agree to therapy  -EM      Precautions/Limitations fall precautions   NWB R LE  -EM      Recorded by [EM] Efra Connor PTA      Vital Signs    Pre Systolic BP Rehab 144  -EM      Pre Treatment Diastolic BP 66  -EM      Pretreatment Heart Rate (beats/min) 84  -EM      Pre SpO2 (%) 94  -EM      O2 Delivery Pre  Treatment room air  -EM      Pre Patient Position Supine  -EM      Post Patient Position Sitting  -EM      Recorded by [EM] Efra Connor PTA      Pain Assessment    Pain Assessment 0-10  -EM      Pain Score 5  -EM      Post Pain Score 4  -EM      Pain Type Acute pain  -EM      Pain Location Ankle  -EM      Pain Orientation Right  -EM      Recorded by [EM] Efra Connor PTA      Cognitive Assessment/Intervention    Current Cognitive/Communication Assessment functional  -EM      Orientation Status oriented x 4  -EM      Follows Commands/Answers Questions 100% of the time  -EM      Personal Safety Interventions nonskid shoes/slippers when out of bed;supervised activity;gait belt;fall prevention program maintained  -EM      Recorded by [EM] Efra Connor PTA      Bed Mobility, Assessment/Treatment    Bed Mob, Supine to Sit, [place] independent  -EM      Bed Mob, Sit to Supine, [place] independent  -EM      Recorded by [EM] Efra Connor PTA      Transfer Assessment/Treatment    Transfers, Sit-Stand [place] independent  -EM      Transfers, Stand-Sit [place] independent  -EM      Transfers, Sit-Stand-Sit, Assist Device rolling walker  -EM      Recorded by [EM] Efra Connor PTA      Gait Assessment/Treatment    Gait, [place] Level stand by assist  -EM      Gait, Assistive Device rolling walker  -EM      Gait, Distance (Feet) 50  -EM      Gait, Maintain Weight Bearing Status able to maintain weight bearing status  -EM      Recorded by [EM] Efra Connor PTA      Therapy Exercises    Left Lower Extremity AROM:;20 reps  -EM      Bilateral Lower Extremities AROM:;20 reps;sitting;LAQ;hip flexion;glut sets  -EM      Recorded by [EM] Efra Connor PTA      Positioning and Restraints    Pre-Treatment Position in bed  -EM      Post Treatment Position chair  -EM      In Chair reclined;sitting;call light within reach;encouraged to call for assist  -EM      Recorded by [EM] Efra Connor PTA         User Key  (r) = Recorded By, (t) = Taken By, (c) = Cosigned By    Initials Name Effective Dates    EM Efra FAUST Connor, PTA 08/11/15 -     AM Luis Enrique Pinto, PTA 10/17/16 -     CS Elizabeth Pulliam, PALMER/L 10/17/16 -     PG Luanne Weeks, OT Student 01/31/17 -                 OT Goals       12/12/17 1134 12/09/17 1152 12/08/17 1151    Transfer Training OT LTG    Transfer Training OT LTG, Date Established   12/08/17  -RB    Transfer Training OT LTG, Time to Achieve   by discharge  -RB    Transfer Training OT LTG, Activity Type   all transfers  -RB    Transfer Training OT LTG, [place] Level   supervision required;contact guard assist  -RB    Transfer Training OT LTG, Assist Device   walker, rolling  -RB    Transfer Training OT LTG, Date Goal Reviewed 12/12/17  -CS (r) PG (t) CS (c) 12/09/17  -CS (r) PG (t) CS (c)     Static Standing Balance OT LTG    Static Standing Balance OT LTG, Date Established   12/08/17  -RB    Static Standing Balance OT LTG, Time to Achieve   by discharge  -RB    Static Standing Balance OT LTG, [place] Level   supervision required   5 minutes with functional activity.  -RB    Static Standing Balance OT LTG, Assist Device   assistive device   R/W.  -RB    Static Standing Balance OT LTG, Date Goal Reviewed 12/12/17  -CS (r) PG (t) CS (c) 12/09/17  -CS (r) PG (t) CS (c)     Patient Education OT LTG    Patient Education OT LTG, Date Established   12/08/17  -RB    Patient Education OT LTG, Time to Achieve   by discharge  -RB    Patient Education OT LTG, Education Type   HEP;home safety;adaptive equipment mgmt;energy conservation;work simplification  -RB    Patient Education OT LTG, Education Understanding   demonstrates adequately;verbalizes understanding  -RB    Patient Education OT LTG, Date Goal Reviewed  12/09/17  -CS (r) PG (t) CS (c)     Patient Education OT LTG Outcome goal partially met  -CS (r) PG (t) CS (c)      ADL OT LTG    ADL OT LTG, Date Established   12/08/17  -RB    ADL  OT LTG, Time to Achieve   by discharge  -RB    ADL OT LTG, Activity Type   ADL skills   Sponge bath and dress or walk-in shower.  -RB    ADL OT LTG, [place] Level   standby assist;contact guard   R/W.  -RB    ADL OT LTG, Date Goal Reviewed 12/12/17  -CS (r) PG (t) CS (c) 12/09/17  -CS (r) PG (t) CS (c)       User Key  (r) = Recorded By, (t) = Taken By, (c) = Cosigned By    Initials Name Provider Type    RB Blu Jones, OT Occupational Therapist    CS CHRIS Ryan Occupational Therapy Assistant    PG Luanne Weeks, OT Student OT Student          Occupational Therapy Education     Title: PT OT SLP Therapies (Active)     Topic: Occupational Therapy (Done)     Point: ADL training (Done)    Description: Instruct learner(s) on proper safety adaptation and remediation techniques during self care or transfers.   Instruct in proper use of assistive devices.    Learning Progress Summary    Learner Readiness Method Response Comment Documented by Status   Patient Acceptance E VU Pt educated on ambulation safety. PG 12/12/17 1133 Done    Acceptance E VU Pt educated on home safety and t/f safety. PG 12/09/17 1151 Done               Point: Home exercise program (Done)    Description: Instruct learner(s) on appropriate technique for monitoring, assisting and/or progressing therapeutic exercises/activities.    Learning Progress Summary    Learner Readiness Method Response Comment Documented by Status   Patient Acceptance E VU Pt educated on ambulation safety. PG 12/12/17 1133 Done    Acceptance E VU Pt educated on home safety and t/f safety. PG 12/09/17 1151 Done               Point: Precautions (Done)    Description: Instruct learner(s) on prescribed precautions during self-care and functional transfers.    Learning Progress Summary    Learner Readiness Method Response Comment Documented by Status   Patient Acceptance E VU Pt educated on ambulation safety. PG 12/12/17 1133 Done    Acceptance E VU Pt educated on  home safety and t/f safety. PG 12/09/17 1151 Done    Acceptance E VU,NR Edu pt on use of gait belt and non skid socks when OOB and no OOB without assist. RB 12/08/17 1147 Done               Point: Body mechanics (Done)    Description: Instruct learner(s) on proper positioning and spine alignment during self-care, functional mobility activities and/or exercises.    Learning Progress Summary    Learner Readiness Method Response Comment Documented by Status   Patient Acceptance E VU Pt educated on ambulation safety. PG 12/12/17 1133 Done    Acceptance E VU Pt educated on home safety and t/f safety. PG 12/09/17 1151 Done                      User Key     Initials Effective Dates Name Provider Type Discipline    RB 06/15/16 -  Blu Jones, OT Occupational Therapist OT    PG 01/31/17 -  Luanne Weeks OT Student OT Student OT                  OT Recommendation and Plan  Anticipated Equipment Needs At Discharge: wheelchair, two wheeled walker  Anticipated Discharge Disposition: skilled nursing facility  Planned Therapy Interventions: activity intolerance, ADL retraining, balance training, transfer training, strengthening, adaptive equipment training  Therapy Frequency:  (3-14x/wk)           Outcome Measures       12/12/17 1100 12/11/17 1345 12/11/17 1050    How much help from another person do you currently need...    Turning from your back to your side while in flat bed without using bedrails?  4  -AM 4  -AM    Moving from lying on back to sitting on the side of a flat bed without bedrails?  4  -AM 4  -AM    Moving to and from a bed to a chair (including a wheelchair)?  3  -AM 3  -AM    Standing up from a chair using your arms (e.g., wheelchair, bedside chair)?  3  -AM 3  -AM    Climbing 3-5 steps with a railing?  3  -AM 3  -AM    To walk in hospital room?  3  -AM 3  -AM    AM-PAC 6 Clicks Score  20  -AM 20  -AM    How much help from another is currently needed...    Putting on and taking off regular lower body  clothing? 2  -CS (r) PG (t) CS (c)      Bathing (including washing, rinsing, and drying) 2  -CS (r) PG (t) CS (c)      Toileting (which includes using toilet bed pan or urinal) 2  -CS (r) PG (t) CS (c)      Putting on and taking off regular upper body clothing 3  -CS (r) PG (t) CS (c)      Taking care of personal grooming (such as brushing teeth) 4  -CS (r) PG (t) CS (c)      Eating meals 4  -CS (r) PG (t) CS (c)      Score 17  -CS (r) PG (t)      Functional Assessment    Outcome Measure Options  AM-PAC 6 Clicks Basic Mobility (PT)  -AM AM-PAC 6 Clicks Basic Mobility (PT)  -AM      12/10/17 0952          How much help from another person do you currently need...    Turning from your back to your side while in flat bed without using bedrails? 4  -EM      Moving from lying on back to sitting on the side of a flat bed without bedrails? 4  -EM      Moving to and from a bed to a chair (including a wheelchair)? 4  -EM      Standing up from a chair using your arms (e.g., wheelchair, bedside chair)? 4  -EM      Climbing 3-5 steps with a railing? 2  -EM      To walk in hospital room? 3  -EM      AM-PAC 6 Clicks Score 21  -EM      Functional Assessment    Outcome Measure Options AM-PAC 6 Clicks Basic Mobility (PT)  -EM        User Key  (r) = Recorded By, (t) = Taken By, (c) = Cosigned By    Initials Name Provider Type    EM Efra Connor, JOSE F Physical Therapy Assistant    DESTINY Pinto, JOSE F Physical Therapy Assistant    CHRIS Roman Occupational Therapy Assistant    AKIN Weeks, OT Student OT Student           Time Calculation:         Time Calculation- OT       12/12/17 1155          Time Calculation- OT    OT Start Time 0833  -CS      OT Stop Time 0947  -CS      OT Time Calculation (min) 74 min  -CS      Total Timed Code Minutes- OT 74 minute(s)  -CS      OT Received On 12/12/17  -        User Key  (r) = Recorded By, (t) = Taken By, (c) = Cosigned By    Initials Name Provider Type    BIGG Johnson  CHRIS Colin Occupational Therapy Assistant           Therapy Charges for Today     Code Description Service Date Service Provider Modifiers Qty    56277003058 HC OT SELF CARE/MGMT/TRAIN EA 15 MIN 12/12/2017 CHRIS Ryan GO 4    34209794851 HC OT THER PROC EA 15 MIN 12/12/2017 CHRIS Ryan GO 1          OT G-codes  OT Professional Judgement Used?: Yes  OT Functional Scales Options: AM-PAC 6 Clicks Daily Activity (OT)  Score: 17  Functional Limitation: Self care  Self Care Current Status (): At least 40 percent but less than 60 percent impaired, limited or restricted  Self Care Goal Status (): At least 20 percent but less than 40 percent impaired, limited or restricted    CHRIS Ryan  12/12/2017

## 2017-12-12 NOTE — SIGNIFICANT NOTE
"   12/12/17 1030   Rehab Treatment   Discipline physical therapy assistant   Treatment Not Performed patient/family declined treatment  (Pt stated, \"I am exhausted from my bath, I want to wait until afternoon - after I eat - I may be leaving today so I need a ride. \")   Recommendation   PT - Next Appointment 12/12/17     "

## 2017-12-12 NOTE — PLAN OF CARE
Problem: Patient Care Overview (Adult)  Goal: Plan of Care Review  Outcome: Ongoing (interventions implemented as appropriate)    12/12/17 0335 12/12/17 1134   Coping/Psychosocial Response Interventions   Plan Of Care Reviewed With patient --    Patient Care Overview   Progress no change --    Outcome Evaluation   Outcome Summary/Follow up Plan --  Pt completed bath while seated/standing at toilet with supervision and min A required to cyndy underwear d/t cast on LLE and decreased ROM. Pt stood sink side to brush teeth with supervision, setup and SBA. Pt completed BUE strengthening exercises in all available planes of motion x20 reps. Continue w/POC.          Problem: Inpatient Occupational Therapy  Goal: Transfer Training Goal 1 LTG- OT  Outcome: Ongoing (interventions implemented as appropriate)    12/08/17 1151 12/12/17 1134   Transfer Training OT LTG   Transfer Training OT LTG, Date Established 12/08/17 --    Transfer Training OT LTG, Time to Achieve by discharge --    Transfer Training OT LTG, Activity Type all transfers --    Transfer Training OT LTG, Hall Level supervision required;contact guard assist --    Transfer Training OT LTG, Assist Device walker, rolling --    Transfer Training OT LTG, Date Goal Reviewed --  12/12/17       Goal: Static Standing Balance Goal LTG- OT  Outcome: Ongoing (interventions implemented as appropriate)    12/08/17 1151 12/12/17 1134   Static Standing Balance OT LTG   Static Standing Balance OT LTG, Date Established 12/08/17 --    Static Standing Balance OT LTG, Time to Achieve by discharge --    Static Standing Balance OT LTG, Hall Level supervision required  (5 minutes with functional activity.) --    Static Standing Balance OT LTG, Assist Device assistive device  (R/W.) --    Static Standing Balance OT LTG, Date Goal Reviewed --  12/12/17       Goal: Patient Education Goal LTG- OT  Outcome: Ongoing (interventions implemented as appropriate)    12/08/17 1151  12/09/17 1152 12/12/17 1134   Patient Education OT LTG   Patient Education OT LTG, Date Established 12/08/17 --  --    Patient Education OT LTG, Time to Achieve by discharge --  --    Patient Education OT LTG, Education Type HEP;home safety;adaptive equipment mgmt;energy conservation;work simplification --  --    Patient Education OT LTG, Education Understanding demonstrates adequately;verbalizes understanding --  --    Patient Education OT LTG, Date Goal Reviewed --  12/09/17 --    Patient Education OT LTG Outcome --  --  goal partially met       Goal: ADL Goal LTG- OT  Outcome: Ongoing (interventions implemented as appropriate)    12/08/17 1151 12/12/17 1134   ADL OT LTG   ADL OT LTG, Date Established 12/08/17 --    ADL OT LTG, Time to Achieve by discharge --    ADL OT LTG, Activity Type ADL skills  (Sponge bath and dress or walk-in shower.) --    ADL OT LTG, [place] Level standby assist;contact guard  (R/W.) --    ADL OT LTG, Date Goal Reviewed --  12/12/17

## 2017-12-14 NOTE — DISCHARGE SUMMARY
Diagnosis; right ankle fracture dislocation  Diagnosis; right ankle fracture dislocation  Ankle fracture open reduction internal fixation.  She was treated with pain medications  Normal diet  No weight bearing  Transferred to a nursing facility for post op  Tylenol for pain, she was given colace for constipation  Orthopaedic surgery follow up in 3 weeks.

## 2017-12-28 DIAGNOSIS — S82.841A CLOSED BIMALLEOLAR FRACTURE OF RIGHT ANKLE, INITIAL ENCOUNTER: Primary | ICD-10-CM

## 2017-12-29 ENCOUNTER — OFFICE VISIT (OUTPATIENT)
Dept: ORTHOPEDIC SURGERY | Facility: CLINIC | Age: 82
End: 2017-12-29

## 2017-12-29 VITALS — BODY MASS INDEX: 22.59 KG/M2 | HEIGHT: 65 IN | WEIGHT: 135.6 LBS

## 2017-12-29 DIAGNOSIS — S82.841A CLOSED BIMALLEOLAR FRACTURE OF RIGHT ANKLE, INITIAL ENCOUNTER: Primary | ICD-10-CM

## 2017-12-29 PROCEDURE — 99024 POSTOP FOLLOW-UP VISIT: CPT | Performed by: ORTHOPAEDIC SURGERY

## 2017-12-29 RX ORDER — HYDROCODONE BITARTRATE AND ACETAMINOPHEN 7.5; 325 MG/1; MG/1
1 TABLET ORAL EVERY 6 HOURS PRN
Qty: 80 TABLET | Refills: 0 | Status: SHIPPED | OUTPATIENT
Start: 2017-12-29 | End: 2018-01-29 | Stop reason: SDUPTHER

## 2017-12-29 NOTE — PROGRESS NOTES
"Rebecca Zaragoza is a 82 y.o. female returns for     Chief Complaint   Patient presents with   • Right Ankle - Follow-up     Repeat xray today in office         HISTORY OF PRESENT ILLNESS:  She is presently in convalescence home, she has been non weight bearing of the right ankle.  Comes today for suture removal.  She is complaining of pain, requesting pain medications.     CONCURRENT MEDICAL HISTORY:    Past Medical History:   Diagnosis Date   • Osteopetrosis        Allergies   Allergen Reactions   • Contrast Dye      Pt states \" I had red dye here and broke out in rash.\"         Current Outpatient Prescriptions:   •  HYDROcodone-acetaminophen (NORCO) 7.5-325 MG per tablet, Take 1 tablet by mouth Every 6 (Six) Hours As Needed for Moderate Pain ., Disp: 80 tablet, Rfl: 0    Past Surgical History:   Procedure Laterality Date   • ANKLE OPEN REDUCTION INTERNAL FIXATION Right 12/7/2017    Procedure: OPEN REDUCTION INTERNAL FIXATION RIGHT ANKLE       (C-ARM#3);  Surgeon: Marco A Ibrahim MD;  Location: [facility];  Service:    • HYSTERECTOMY         ROS  No fevers or chills.  No chest pain or shortness of air.  No GI or  disturbances.    PHYSICAL EXAMINATION:       Ht 165.1 cm (65\")  Wt 61.5 kg (135 lb 9.6 oz)  BMI 22.57 kg/m2    Physical Exam    GAIT:     []  Normal  []  Antalgic    Assistive device: []  None  []  Walker     []  Crutches  []  Cane     []  Wheelchair  []  Stretcher    Ortho Exam    Mild periincisional erythema, no drainage.  Incision suture line is healing well  Swelling is minimal  Fracture blister of medial ankle is resolving.        Xr Ankle 3+ View Right    Result Date: 12/29/2017  Narrative: Xray right ankle with AP oblique lateral view bone quality is poor, excellent alignment, fractures are not healed, ankle joint is aligned appropriately.    Xr Ankle 3+ View Right    Result Date: 12/6/2017  Narrative: Right ankle three view on 12/5/2017 CLINICAL INDICATION: Post reduction " COMPARISON: 12/5/2017 FINDINGS: There has been improved alignment of the talus in relation to the distal tibial plafond but there remains some posterior positioning of the talar dome in relation to the distal tibia. There has been improved alignment of the bimalleolar ankle fracture with some continued lateral and posterior displacement. There also appears to be a posterior malleolar fracture that is mildly displaced.     Impression: Improved alignment of ankle dislocation with some continued mild posterior subluxation/partial dislocation. There has been improved alignment of apparent trimalleolar ankle fracture. Electronically signed by:  Anthony Cobos  12/6/2017 12:06 AM CST Workstation: [email or web address] / [email or web address]    Xr Ankle 3+ View Right    Result Date: 12/5/2017  Narrative: Right ankle three view on 12/5/2017 CLINICAL INDICATION: Post reduction COMPARISON: 12/5/2017 FINDINGS: There remains posterior dislocation of the talus in relation to the distal tibial plafond. Would still recommend reimaging after successful relocation. Bimalleolar ankle fractures are again noted with continued lateral and posterior displacement of the distal fracture fragments.     Impression: Unsuccessful reduction of the ankle dislocation with continued posterior dislocation. Recommend reimaging after relocation. Electronically signed by:  Anthony Cobos  12/5/2017 10:38 PM CST Workstation: [email or web address]    Xr Ankle 3+ View Right    Result Date: 12/5/2017  Narrative: Right ankle three view on 12/5/2017 CLINICAL INDICATION: Pain after fall COMPARISON: None FINDINGS: Ankle dislocation is noted with posterior and lateral dislocation of the talus in relation to the distal tibial plafond. There is an acute, oblique, displaced distal fibula metadiaphysis fracture with lateral and posterior displacement and lateral and posterior angulation of the distal fracture fragment. There is an acute, transverse, displaced fracture through the base of the  medial malleolus with lateral and likely posterior displacement of the distal fracture fragment. No definite posterior malleolar fracture is noted however would recommend reimaging after relocation. Plantar calcaneal spur is noted. No other fracture is noted.     Impression: At least bimalleolar ankle fracture/dislocation as above. Recommend reimaging after relocation. Electronically signed by:  Anthony Cobos  12/5/2017 9:15 PM [facility] Workstation: [email or web address]            ASSESSMENT:    Diagnoses and all orders for this visit:    Closed bimalleolar fracture of right ankle, initial encounter    Other orders  -     HYDROcodone-acetaminophen (NORCO) 7.5-325 MG per tablet; Take 1 tablet by mouth Every 6 (Six) Hours As Needed for Moderate Pain .          PLAN    Continue non weight bearing, short leg cast for one month.    norco prescription is provided today for pain and I cautioned her that narcotic medications can become addictive.    Left ankle xray      Marco A Ibrahim MD

## 2018-01-25 DIAGNOSIS — S82.841A CLOSED BIMALLEOLAR FRACTURE OF RIGHT ANKLE, INITIAL ENCOUNTER: Primary | ICD-10-CM

## 2018-01-29 ENCOUNTER — OFFICE VISIT (OUTPATIENT)
Dept: ORTHOPEDIC SURGERY | Facility: CLINIC | Age: 83
End: 2018-01-29

## 2018-01-29 VITALS — WEIGHT: 135 LBS | HEIGHT: 65 IN | BODY MASS INDEX: 22.49 KG/M2

## 2018-01-29 DIAGNOSIS — S82.841D CLOSED BIMALLEOLAR FRACTURE OF RIGHT ANKLE WITH ROUTINE HEALING, SUBSEQUENT ENCOUNTER: Primary | ICD-10-CM

## 2018-01-29 PROCEDURE — 99024 POSTOP FOLLOW-UP VISIT: CPT | Performed by: ORTHOPAEDIC SURGERY

## 2018-01-29 RX ORDER — HYDROCODONE BITARTRATE AND ACETAMINOPHEN 7.5; 325 MG/1; MG/1
1 TABLET ORAL EVERY 6 HOURS PRN
Qty: 80 TABLET | Refills: 0 | Status: SHIPPED | OUTPATIENT
Start: 2018-01-29 | End: 2018-03-16 | Stop reason: SDUPTHER

## 2018-03-09 DIAGNOSIS — S82.841D CLOSED BIMALLEOLAR FRACTURE OF RIGHT ANKLE WITH ROUTINE HEALING, SUBSEQUENT ENCOUNTER: Primary | ICD-10-CM

## 2018-03-16 ENCOUNTER — OFFICE VISIT (OUTPATIENT)
Dept: ORTHOPEDIC SURGERY | Facility: CLINIC | Age: 83
End: 2018-03-16

## 2018-03-16 VITALS — WEIGHT: 139 LBS | HEIGHT: 65 IN | BODY MASS INDEX: 23.16 KG/M2

## 2018-03-16 DIAGNOSIS — S82.841S CLOSED BIMALLEOLAR FRACTURE OF RIGHT ANKLE, SEQUELA: Primary | ICD-10-CM

## 2018-03-16 PROCEDURE — 99213 OFFICE O/P EST LOW 20 MIN: CPT | Performed by: ORTHOPAEDIC SURGERY

## 2018-03-16 RX ORDER — HYDROCODONE BITARTRATE AND ACETAMINOPHEN 7.5; 325 MG/1; MG/1
1 TABLET ORAL EVERY 6 HOURS PRN
Qty: 60 TABLET | Refills: 0 | Status: SHIPPED | OUTPATIENT
Start: 2018-03-16 | End: 2018-05-18

## 2018-03-16 NOTE — PROGRESS NOTES
"Rebecca Zaragoza is a 82 y.o. female returns for     Chief Complaint   Patient presents with   • Right Ankle - Follow-up       HISTORY OF PRESENT ILLNESS: Patient is here for 6 week recheck- ORIF RT ankle. Date of surgery- 12/07/2018. Patient states she has slowly began placing partial weight on her right ankle as instructed. Patient reports 8/10 pain at this time. Pain seems to increase at night. Patient was sent to xray upon arrival.       CONCURRENT MEDICAL HISTORY:    Past Medical History:   Diagnosis Date   • Osteopetrosis        Allergies   Allergen Reactions   • Contrast Dye      Pt states \" I had red dye here and broke out in rash.\"         Current Outpatient Prescriptions:   •  HYDROcodone-acetaminophen (NORCO) 7.5-325 MG per tablet, Take 1 tablet by mouth Every 6 (Six) Hours As Needed for Moderate Pain ., Disp: 60 tablet, Rfl: 0    Past Surgical History:   Procedure Laterality Date   • ANKLE OPEN REDUCTION INTERNAL FIXATION Right 12/7/2017    Procedure: OPEN REDUCTION INTERNAL FIXATION RIGHT ANKLE       (C-ARM#3);  Surgeon: Marco A Ibrahim MD;  Location: [facility];  Service:    • HYSTERECTOMY         ROS  No fevers or chills.  No chest pain or shortness of air.  No GI or  disturbances.    PHYSICAL EXAMINATION:       Ht 165.1 cm (65\")   Wt 63 kg (139 lb)   BMI 23.13 kg/m²     Physical Exam    GAIT:     []  Normal  []  Antalgic    Assistive device: []  None  []  Walker     []  Crutches  [x]  Cane     []  Wheelchair  []  Stretcher    Ortho Exam   Left ankle incision is healed  Swelling minimal      Xr Ankle 3+ View Right    Result Date: 3/16/2018  Narrative: Xray right ankle with AP lateral and oblique view, bone quality is decreased, alignment is good, posterior tibial fracture piece is displaced, joint alignment is congruent fracture appliance in position, evidence of fibula and medial malleolus healing.              ASSESSMENT:    Diagnoses and all orders for this visit:    Closed " bimalleolar fracture of right ankle, sequela    Other orders  -     HYDROcodone-acetaminophen (NORCO) 7.5-325 MG per tablet; Take 1 tablet by mouth Every 6 (Six) Hours As Needed for Moderate Pain .          PLAN    Recommend ankle brace.  norco is prescribed, I cautioned her that narcotic medications can be addictive.    Xray right ankle     Marco A Ibrahim MD

## 2018-03-29 DIAGNOSIS — S82.841D CLOSED BIMALLEOLAR FRACTURE OF RIGHT ANKLE WITH ROUTINE HEALING, SUBSEQUENT ENCOUNTER: Primary | ICD-10-CM

## 2018-03-30 ENCOUNTER — OFFICE VISIT (OUTPATIENT)
Dept: ORTHOPEDIC SURGERY | Facility: CLINIC | Age: 83
End: 2018-03-30

## 2018-03-30 VITALS — BODY MASS INDEX: 22.49 KG/M2 | HEIGHT: 65 IN | WEIGHT: 135 LBS

## 2018-03-30 DIAGNOSIS — S82.841D CLOSED BIMALLEOLAR FRACTURE OF RIGHT ANKLE WITH ROUTINE HEALING, SUBSEQUENT ENCOUNTER: Primary | ICD-10-CM

## 2018-03-30 PROCEDURE — 99213 OFFICE O/P EST LOW 20 MIN: CPT | Performed by: ORTHOPAEDIC SURGERY

## 2018-03-30 NOTE — PROGRESS NOTES
"Rebecca Zaragoza is a 82 y.o. female returns for     Chief Complaint   Patient presents with   • Right Ankle - Fracture     Xray today       HISTORY OF PRESENT ILLNESS:  Follow up right ankle with repeat xray today. Patient reports she is not doing well.  She has fallen several times.  3-4 times a day.  Starts trembling then she falls.  Has a walker at home.   ankle pain is controlled.  Ankle swelling is controlled.     CONCURRENT MEDICAL HISTORY:    Past Medical History:   Diagnosis Date   • Osteopetrosis        Allergies   Allergen Reactions   • Contrast Dye      Pt states \" I had red dye here and broke out in rash.\"         Current Outpatient Prescriptions:   •  HYDROcodone-acetaminophen (NORCO) 7.5-325 MG per tablet, Take 1 tablet by mouth Every 6 (Six) Hours As Needed for Moderate Pain ., Disp: 60 tablet, Rfl: 0    Past Surgical History:   Procedure Laterality Date   • ANKLE OPEN REDUCTION INTERNAL FIXATION Right 12/7/2017    Procedure: OPEN REDUCTION INTERNAL FIXATION RIGHT ANKLE       (C-ARM#3);  Surgeon: Marco A Ibrahim MD;  Location: [facility];  Service:    • HYSTERECTOMY         ROS  No fevers or chills.  No chest pain or shortness of air.  No GI or  disturbances.    PHYSICAL EXAMINATION:       Ht 165.1 cm (65\")   Wt 61.2 kg (135 lb)   BMI 22.47 kg/m²     Physical Exam    GAIT:     []  Normal  []  Antalgic    Assistive device: []  None  []  Walker     []  Crutches  []  Cane     []  Wheelchair  []  Stretcher    Ortho Exam    Right ankle incisions are well healed, swelling is controlled  Plantarflexion and dorsiflexion of foot is reasonable.  Skin wrinkles are present.  In wheelchair.        Xr Ankle 3+ View Right    Result Date: 3/30/2018  Narrative: Xray right ankle with Anteroposterior lateral view bone quality is diminished, ankle alignment and fracture appliance in position.  Ankle mortise is well aligned.      Xr Ankle 3+ View Right    Result Date: 3/16/2018  Narrative: Xray right " ankle with AP lateral and oblique view, bone quality is decreased, alignment is good, posterior tibial fracture piece is displaced, joint alignment is congruent fracture appliance in position, evidence of fibula and medial malleolus healing.              ASSESSMENT:    Diagnoses and all orders for this visit:    Closed bimalleolar fracture of right ankle with routine healing, subsequent encounter          PLAN      She needs follow up with primary care physician for falls risk assessment. Home health is ordered for assessment, hopefully can prevent some falls with additional physical therapy.        Marco A Ibrahim MD

## 2018-04-03 ENCOUNTER — TELEPHONE (OUTPATIENT)
Dept: ORTHOPEDIC SURGERY | Facility: CLINIC | Age: 83
End: 2018-04-03

## 2018-04-09 ENCOUNTER — HOSPITAL ENCOUNTER (EMERGENCY)
Facility: HOSPITAL | Age: 83
Discharge: HOME | End: 2018-04-09
Attending: EMERGENCY MEDICINE | Admitting: EMERGENCY MEDICINE

## 2018-04-09 VITALS
HEART RATE: 66 BPM | WEIGHT: 135 LBS | TEMPERATURE: 98.3 F | RESPIRATION RATE: 18 BRPM | OXYGEN SATURATION: 97 % | HEIGHT: 64 IN | BODY MASS INDEX: 23.05 KG/M2 | DIASTOLIC BLOOD PRESSURE: 73 MMHG | SYSTOLIC BLOOD PRESSURE: 145 MMHG

## 2018-04-09 DIAGNOSIS — N39.0 URINARY TRACT INFECTION WITHOUT HEMATURIA, SITE UNSPECIFIED: Primary | ICD-10-CM

## 2018-04-09 LAB
ALBUMIN SERPL-MCNC: 3.4 G/DL (ref 3.4–4.8)
ALBUMIN/GLOB SERPL: 0.9 G/DL (ref 1.1–1.8)
ALP SERPL-CCNC: 128 U/L (ref 38–126)
ALT SERPL W P-5'-P-CCNC: 44 U/L (ref 9–52)
ANION GAP SERPL CALCULATED.3IONS-SCNC: 12 MMOL/L (ref 5–15)
AST SERPL-CCNC: 51 U/L (ref 14–36)
BACTERIA UR QL AUTO: ABNORMAL /HPF
BASOPHILS # BLD AUTO: 0.04 10*3/MM3 (ref 0–0.2)
BASOPHILS NFR BLD AUTO: 0.3 % (ref 0–2)
BILIRUB SERPL-MCNC: 0.7 MG/DL (ref 0.2–1.3)
BILIRUB UR QL STRIP: NEGATIVE
BUN BLD-MCNC: 12 MG/DL (ref 7–21)
BUN/CREAT SERPL: 12 (ref 7–25)
CALCIUM SPEC-SCNC: 8.8 MG/DL (ref 8.4–10.2)
CHLORIDE SERPL-SCNC: 100 MMOL/L (ref 95–110)
CLARITY UR: ABNORMAL
CO2 SERPL-SCNC: 28 MMOL/L (ref 22–31)
COLOR UR: YELLOW
CREAT BLD-MCNC: 1 MG/DL (ref 0.5–1)
DEPRECATED RDW RBC AUTO: 47.1 FL (ref 36.4–46.3)
EOSINOPHIL # BLD AUTO: 0.14 10*3/MM3 (ref 0–0.7)
EOSINOPHIL NFR BLD AUTO: 1.2 % (ref 0–7)
ERYTHROCYTE [DISTWIDTH] IN BLOOD BY AUTOMATED COUNT: 15 % (ref 11.5–14.5)
GFR SERPL CREATININE-BSD FRML MDRD: 53 ML/MIN/1.73 (ref 39–90)
GLOBULIN UR ELPH-MCNC: 3.6 GM/DL (ref 2.3–3.5)
GLUCOSE BLD-MCNC: 101 MG/DL (ref 60–100)
GLUCOSE UR STRIP-MCNC: NEGATIVE MG/DL
HCT VFR BLD AUTO: 28 % (ref 35–45)
HGB BLD-MCNC: 9.5 G/DL (ref 12–15.5)
HGB UR QL STRIP.AUTO: NEGATIVE
HOLD SPECIMEN: NORMAL
HOLD SPECIMEN: NORMAL
HYALINE CASTS UR QL AUTO: ABNORMAL /LPF
IMM GRANULOCYTES # BLD: 0.07 10*3/MM3 (ref 0–0.02)
IMM GRANULOCYTES NFR BLD: 0.6 % (ref 0–0.5)
KETONES UR QL STRIP: NEGATIVE
LEUKOCYTE ESTERASE UR QL STRIP.AUTO: ABNORMAL
LIPASE SERPL-CCNC: 61 U/L (ref 23–300)
LYMPHOCYTES # BLD AUTO: 1.72 10*3/MM3 (ref 0.6–4.2)
LYMPHOCYTES NFR BLD AUTO: 14.5 % (ref 10–50)
MCH RBC QN AUTO: 29.2 PG (ref 26.5–34)
MCHC RBC AUTO-ENTMCNC: 33.9 G/DL (ref 31.4–36)
MCV RBC AUTO: 86.2 FL (ref 80–98)
MONOCYTES # BLD AUTO: 0.76 10*3/MM3 (ref 0–0.9)
MONOCYTES NFR BLD AUTO: 6.4 % (ref 0–12)
NEUTROPHILS # BLD AUTO: 9.1 10*3/MM3 (ref 2–8.6)
NEUTROPHILS NFR BLD AUTO: 77 % (ref 37–80)
NITRITE UR QL STRIP: NEGATIVE
PH UR STRIP.AUTO: 6 [PH] (ref 5–9)
PLATELET # BLD AUTO: 589 10*3/MM3 (ref 150–450)
PMV BLD AUTO: 10.4 FL (ref 8–12)
POTASSIUM BLD-SCNC: 3 MMOL/L (ref 3.5–5.1)
PROT SERPL-MCNC: 7 G/DL (ref 6.3–8.6)
PROT UR QL STRIP: NEGATIVE
RBC # BLD AUTO: 3.25 10*6/MM3 (ref 3.77–5.16)
RBC # UR: ABNORMAL /HPF
REF LAB TEST METHOD: ABNORMAL
SODIUM BLD-SCNC: 140 MMOL/L (ref 137–145)
SP GR UR STRIP: 1.01 (ref 1–1.03)
SQUAMOUS #/AREA URNS HPF: ABNORMAL /HPF
UROBILINOGEN UR QL STRIP: ABNORMAL
WBC NRBC COR # BLD: 11.83 10*3/MM3 (ref 3.2–9.8)
WBC UR QL AUTO: ABNORMAL /HPF
WHOLE BLOOD HOLD SPECIMEN: NORMAL
WHOLE BLOOD HOLD SPECIMEN: NORMAL

## 2018-04-09 PROCEDURE — 83690 ASSAY OF LIPASE: CPT | Performed by: EMERGENCY MEDICINE

## 2018-04-09 PROCEDURE — 36415 COLL VENOUS BLD VENIPUNCTURE: CPT

## 2018-04-09 PROCEDURE — 25010000002 CEFTRIAXONE PER 250 MG: Performed by: EMERGENCY MEDICINE

## 2018-04-09 PROCEDURE — 80053 COMPREHEN METABOLIC PANEL: CPT | Performed by: EMERGENCY MEDICINE

## 2018-04-09 PROCEDURE — 85025 COMPLETE CBC W/AUTO DIFF WBC: CPT | Performed by: EMERGENCY MEDICINE

## 2018-04-09 PROCEDURE — 96365 THER/PROPH/DIAG IV INF INIT: CPT

## 2018-04-09 PROCEDURE — 81001 URINALYSIS AUTO W/SCOPE: CPT | Performed by: EMERGENCY MEDICINE

## 2018-04-09 PROCEDURE — 96361 HYDRATE IV INFUSION ADD-ON: CPT

## 2018-04-09 PROCEDURE — 99283 EMERGENCY DEPT VISIT LOW MDM: CPT

## 2018-04-09 RX ORDER — NITROFURANTOIN 25; 75 MG/1; MG/1
100 CAPSULE ORAL 2 TIMES DAILY
Qty: 14 CAPSULE | Refills: 0 | Status: SHIPPED | OUTPATIENT
Start: 2018-04-09 | End: 2018-05-18

## 2018-04-09 RX ORDER — SODIUM CHLORIDE 0.9 % (FLUSH) 0.9 %
10 SYRINGE (ML) INJECTION AS NEEDED
Status: DISCONTINUED | OUTPATIENT
Start: 2018-04-09 | End: 2018-04-09 | Stop reason: HOSPADM

## 2018-04-09 RX ORDER — SODIUM CHLORIDE 9 MG/ML
125 INJECTION, SOLUTION INTRAVENOUS CONTINUOUS
Status: DISCONTINUED | OUTPATIENT
Start: 2018-04-09 | End: 2018-04-09 | Stop reason: HOSPADM

## 2018-04-09 RX ADMIN — CEFTRIAXONE SODIUM 1 G: 1 INJECTION, POWDER, FOR SOLUTION INTRAMUSCULAR; INTRAVENOUS at 16:53

## 2018-04-09 RX ADMIN — SODIUM CHLORIDE 125 ML/HR: 9 INJECTION, SOLUTION INTRAVENOUS at 15:43

## 2018-04-09 NOTE — ED PROVIDER NOTES
"Subjective   Patient presents with dysuria ×3 weeks.  Patient notes increased volume of urine, increased frequency.  Patient has maybe one episode of chills about a week ago.  Patient denies fevers.  Patient has any kind of bowel changes or diarrhea.  Patient continues with by mouth intake.  Patient with a chronic back pain and notes increased back pain over the past 48 hours.  This is in the left side low back.  Patient notes that she was considering a appointment with Dr. Novoa but opted to come to the ED to expedite the workup.        History provided by:  Patient   used: No        Review of Systems   Constitutional: Negative.  Negative for appetite change, chills and fever.   HENT: Negative.  Negative for congestion.    Eyes: Negative.  Negative for photophobia and visual disturbance.   Respiratory: Negative.  Negative for cough, chest tightness and shortness of breath.    Cardiovascular: Negative.  Negative for chest pain and palpitations.   Gastrointestinal: Negative.  Negative for abdominal pain, constipation, diarrhea, nausea and vomiting.   Endocrine: Negative.    Genitourinary: Positive for dysuria and frequency. Negative for decreased urine volume, flank pain and hematuria.   Musculoskeletal: Positive for back pain. Negative for arthralgias, myalgias, neck pain and neck stiffness.   Skin: Negative.  Negative for pallor.   Neurological: Negative.  Negative for dizziness, syncope, weakness, light-headedness, numbness and headaches.   Psychiatric/Behavioral: Negative.  Negative for confusion and suicidal ideas. The patient is not nervous/anxious.    All other systems reviewed and are negative.      Past Medical History:   Diagnosis Date   • Osteopetrosis        Allergies   Allergen Reactions   • Contrast Dye      Pt states \" I had red dye here and broke out in rash.\"       Past Surgical History:   Procedure Laterality Date   • ANKLE OPEN REDUCTION INTERNAL FIXATION Right 12/7/2017    " Procedure: OPEN REDUCTION INTERNAL FIXATION RIGHT ANKLE       (C-ARM#3);  Surgeon: Marco A Ibrahim MD;  Location: [facility] OR;  Service:    • HYSTERECTOMY         No family history on file.    Social History     Social History   • Marital status:      Social History Main Topics   • Smoking status: Never Smoker   • Smokeless tobacco: Never Used   • Alcohol use No   • Drug use: No   • Sexual activity: Defer     Other Topics Concern   • Not on file           Objective   Physical Exam   Constitutional: She is oriented to person, place, and time. She appears well-developed and well-nourished. No distress.   HENT:   Head: Normocephalic and atraumatic.   Nose: Nose normal.   Mouth/Throat: Oropharynx is clear and moist.   Eyes: Conjunctivae and EOM are normal. No scleral icterus.   Neck: Normal range of motion. Neck supple. No JVD present.   Cardiovascular: Normal rate, regular rhythm, normal heart sounds and intact distal pulses.  Exam reveals no gallop and no friction rub.    No murmur heard.  Pulmonary/Chest: Effort normal. No respiratory distress. She has no wheezes. She has no rales. She exhibits no tenderness.   Abdominal: Soft. She exhibits no distension and no mass. There is no tenderness. There is no rebound and no guarding.   Musculoskeletal: Normal range of motion. She exhibits no edema, tenderness or deformity.   Lymphadenopathy:     She has no cervical adenopathy.   Neurological: She is alert and oriented to person, place, and time. No cranial nerve deficit. She exhibits normal muscle tone.   Skin: Skin is warm and dry. No rash noted. She is not diaphoretic. No erythema. No pallor.   Psychiatric: She has a normal mood and affect. Her behavior is normal. Judgment and thought content normal.   Nursing note and vitals reviewed.      Procedures         ED Course  ED Course      Labs Reviewed   COMPREHENSIVE METABOLIC PANEL - Abnormal; Notable for the following:        Result Value    Glucose 101 (*)      Potassium 3.0 (*)     AST (SGOT) 51 (*)     Alkaline Phosphatase 128 (*)     Globulin 3.6 (*)     A/G Ratio 0.9 (*)     All other components within normal limits    Narrative:     The MDRD GFR formula is only valid for adults with stable renal function between ages 18 and 70.   URINALYSIS W/ MICROSCOPIC IF INDICATED - Abnormal; Notable for the following:     Appearance, UA Cloudy (*)     Leuk Esterase, UA Moderate (2+) (*)     All other components within normal limits   CBC WITH AUTO DIFFERENTIAL - Abnormal; Notable for the following:     WBC 11.83 (*)     RBC 3.25 (*)     Hemoglobin 9.5 (*)     Hematocrit 28.0 (*)     RDW 15.0 (*)     RDW-SD 47.1 (*)     Platelets 589 (*)     Immature Grans % 0.6 (*)     Neutrophils, Absolute 9.10 (*)     Immature Grans, Absolute 0.07 (*)     All other components within normal limits   URINALYSIS, MICROSCOPIC ONLY - Abnormal; Notable for the following:     RBC, UA 0-2 (*)     WBC, UA Too Numerous to Count (*)     Bacteria, UA 3+ (*)     All other components within normal limits   LIPASE - Normal   RAINBOW DRAW    Narrative:     The following orders were created for panel order [place] Draw.  Procedure                               Abnormality         Status                     ---------                               -----------         ------                     Light Blue Top[[id number]]                                   Final result               Green Top (Gel)[[id number]]                                  Final result               Lavender Top[[id number]]                                     Final result               Gold Top - SST[[id number]]                                   Final result                 Please view results for these tests on the individual orders.   CBC AND DIFFERENTIAL    Narrative:     The following orders were created for panel order CBC & Differential.  Procedure                               Abnormality         Status                     ---------                                -----------         ------                     CBC Auto Differential[[id number]]        Abnormal            Final result                 Please view results for these tests on the individual orders.   LIGHT BLUE TOP   GREEN TOP   LAVENDER TOP   GOLD TOP - SST       No orders to display     UTI.  The Rocephin given in the ED.  We'll discharge with Macrobid with primary care follow-up as needed.  Patient continues nontoxic.              MDM    Final diagnoses:   Urinary tract infection without hematuria, site unspecified            Michele Mac MD  04/09/18 1004

## 2018-05-18 ENCOUNTER — OFFICE VISIT (OUTPATIENT)
Dept: ORTHOPEDIC SURGERY | Facility: CLINIC | Age: 83
End: 2018-05-18

## 2018-05-18 VITALS — HEIGHT: 64 IN | WEIGHT: 127 LBS | BODY MASS INDEX: 21.68 KG/M2

## 2018-05-18 DIAGNOSIS — S82.841D CLOSED BIMALLEOLAR FRACTURE OF RIGHT ANKLE WITH ROUTINE HEALING, SUBSEQUENT ENCOUNTER: Primary | ICD-10-CM

## 2018-05-18 PROCEDURE — 99213 OFFICE O/P EST LOW 20 MIN: CPT | Performed by: ORTHOPAEDIC SURGERY

## 2018-05-18 NOTE — PROGRESS NOTES
"Rebecca Zaragoza is a 82 y.o. female returns for     Chief Complaint   Patient presents with   • Right Ankle - Follow-up       HISTORY OF PRESENT ILLNESS: Patient being seen for right ankle follow up.   Complains of swelling of the right ankle.  The ankle is minimally painful.  She is ambulating well.         CONCURRENT MEDICAL HISTORY:    Past Medical History:   Diagnosis Date   • Osteopetrosis        Allergies   Allergen Reactions   • Contrast Dye      Pt states \" I had red dye here and broke out in rash.\"       No current outpatient prescriptions on file.    Past Surgical History:   Procedure Laterality Date   • ANKLE OPEN REDUCTION INTERNAL FIXATION Right 12/7/2017    Procedure: OPEN REDUCTION INTERNAL FIXATION RIGHT ANKLE       (C-ARM#3);  Surgeon: Marco A Ibrahim MD;  Location: [facility];  Service:    • HYSTERECTOMY         ROS  No fevers or chills.  No chest pain or shortness of air.  No GI or  disturbances.    PHYSICAL EXAMINATION:       Ht 162.6 cm (64\")   Wt 57.6 kg (127 lb)   BMI 21.80 kg/m²     Physical Exam    GAIT:     []  Normal  []  Antalgic    Assistive device: [x]  None  []  Walker     []  Crutches  []  Cane     []  Wheelchair  []  Stretcher    Ortho Exam    Right ankle plantarflexion 20, dorsiflexion 10  No tenderness with palpation  + swelling around the ankle joint.    Minimal tenderness  Ambulating well without a cane.          XR Ankle 3+ View Right  3/30/2018    Xray right ankle with Anteroposterior lateral view bone quality is diminished, ankle alignment and fracture appliance in position.  Ankle mortise is well aligned.            ASSESSMENT:    Diagnoses and all orders for this visit:    Closed bimalleolar fracture of right ankle with routine healing, subsequent encounter          PLAN      Continue progressive ambulation and strengthening.  Elevation and use of antiembolic compressive hose recommended to control swelling.    Xray right ankle.    Referral for primary care " establishment.        Marco A Ibrahim MD

## 2018-06-16 ENCOUNTER — HOSPITAL ENCOUNTER (EMERGENCY)
Facility: HOSPITAL | Age: 83
Discharge: HOME | End: 2018-06-16
Admitting: NURSE PRACTITIONER

## 2018-06-16 ENCOUNTER — APPOINTMENT (OUTPATIENT)
Dept: CT IMAGING | Facility: HOSPITAL | Age: 83
End: 2018-06-16

## 2018-06-16 VITALS
HEIGHT: 65 IN | TEMPERATURE: 98 F | SYSTOLIC BLOOD PRESSURE: 170 MMHG | HEART RATE: 58 BPM | OXYGEN SATURATION: 99 % | DIASTOLIC BLOOD PRESSURE: 71 MMHG | WEIGHT: 127 LBS | BODY MASS INDEX: 21.16 KG/M2 | RESPIRATION RATE: 18 BRPM

## 2018-06-16 DIAGNOSIS — R10.31 RIGHT LOWER QUADRANT ABDOMINAL PAIN: Primary | ICD-10-CM

## 2018-06-16 LAB
ALBUMIN SERPL-MCNC: 4.6 G/DL (ref 3.4–4.8)
ALBUMIN/GLOB SERPL: 1.3 G/DL (ref 1.1–1.8)
ALP SERPL-CCNC: 60 U/L (ref 38–126)
ALT SERPL W P-5'-P-CCNC: 25 U/L (ref 9–52)
ANION GAP SERPL CALCULATED.3IONS-SCNC: 12 MMOL/L (ref 5–15)
AST SERPL-CCNC: 34 U/L (ref 14–36)
BACTERIA UR QL AUTO: ABNORMAL /HPF
BASOPHILS # BLD AUTO: 0.05 10*3/MM3 (ref 0–0.2)
BASOPHILS NFR BLD AUTO: 0.6 % (ref 0–2)
BILIRUB SERPL-MCNC: 0.5 MG/DL (ref 0.2–1.3)
BILIRUB UR QL STRIP: NEGATIVE
BUN BLD-MCNC: 20 MG/DL (ref 7–21)
BUN/CREAT SERPL: 19.2 (ref 7–25)
CALCIUM SPEC-SCNC: 10 MG/DL (ref 8.4–10.2)
CHLORIDE SERPL-SCNC: 105 MMOL/L (ref 95–110)
CLARITY UR: CLEAR
CO2 SERPL-SCNC: 27 MMOL/L (ref 22–31)
COLOR UR: YELLOW
CREAT BLD-MCNC: 1.04 MG/DL (ref 0.5–1)
DEPRECATED RDW RBC AUTO: 51.5 FL (ref 36.4–46.3)
EOSINOPHIL # BLD AUTO: 0.29 10*3/MM3 (ref 0–0.7)
EOSINOPHIL NFR BLD AUTO: 3.5 % (ref 0–7)
ERYTHROCYTE [DISTWIDTH] IN BLOOD BY AUTOMATED COUNT: 16 % (ref 11.5–14.5)
GFR SERPL CREATININE-BSD FRML MDRD: 51 ML/MIN/1.73 (ref 39–90)
GLOBULIN UR ELPH-MCNC: 3.6 GM/DL (ref 2.3–3.5)
GLUCOSE BLD-MCNC: 94 MG/DL (ref 60–100)
GLUCOSE UR STRIP-MCNC: NEGATIVE MG/DL
HCT VFR BLD AUTO: 35.3 % (ref 35–45)
HGB BLD-MCNC: 11.7 G/DL (ref 12–15.5)
HGB UR QL STRIP.AUTO: NEGATIVE
HOLD SPECIMEN: NORMAL
HYALINE CASTS UR QL AUTO: ABNORMAL /LPF
IMM GRANULOCYTES # BLD: 0.01 10*3/MM3 (ref 0–0.02)
IMM GRANULOCYTES NFR BLD: 0.1 % (ref 0–0.5)
KETONES UR QL STRIP: NEGATIVE
LEUKOCYTE ESTERASE UR QL STRIP.AUTO: ABNORMAL
LIPASE SERPL-CCNC: 97 U/L (ref 23–300)
LYMPHOCYTES # BLD AUTO: 2.58 10*3/MM3 (ref 0.6–4.2)
LYMPHOCYTES NFR BLD AUTO: 31.2 % (ref 10–50)
MCH RBC QN AUTO: 29 PG (ref 26.5–34)
MCHC RBC AUTO-ENTMCNC: 33.1 G/DL (ref 31.4–36)
MCV RBC AUTO: 87.4 FL (ref 80–98)
MONOCYTES # BLD AUTO: 0.63 10*3/MM3 (ref 0–0.9)
MONOCYTES NFR BLD AUTO: 7.6 % (ref 0–12)
NEUTROPHILS # BLD AUTO: 4.71 10*3/MM3 (ref 2–8.6)
NEUTROPHILS NFR BLD AUTO: 57 % (ref 37–80)
NITRITE UR QL STRIP: NEGATIVE
PH UR STRIP.AUTO: 7 [PH] (ref 5–9)
PLATELET # BLD AUTO: 282 10*3/MM3 (ref 150–450)
PMV BLD AUTO: 11.2 FL (ref 8–12)
POTASSIUM BLD-SCNC: 3.8 MMOL/L (ref 3.5–5.1)
PROT SERPL-MCNC: 8.2 G/DL (ref 6.3–8.6)
PROT UR QL STRIP: NEGATIVE
RBC # BLD AUTO: 4.04 10*6/MM3 (ref 3.77–5.16)
RBC # UR: ABNORMAL /HPF
REF LAB TEST METHOD: ABNORMAL
SODIUM BLD-SCNC: 144 MMOL/L (ref 137–145)
SP GR UR STRIP: 1.01 (ref 1–1.03)
SQUAMOUS #/AREA URNS HPF: ABNORMAL /HPF
UROBILINOGEN UR QL STRIP: ABNORMAL
WBC NRBC COR # BLD: 8.27 10*3/MM3 (ref 3.2–9.8)
WBC UR QL AUTO: ABNORMAL /HPF
WHOLE BLOOD HOLD SPECIMEN: NORMAL

## 2018-06-16 PROCEDURE — 81001 URINALYSIS AUTO W/SCOPE: CPT | Performed by: NURSE PRACTITIONER

## 2018-06-16 PROCEDURE — 96374 THER/PROPH/DIAG INJ IV PUSH: CPT

## 2018-06-16 PROCEDURE — 99284 EMERGENCY DEPT VISIT MOD MDM: CPT

## 2018-06-16 PROCEDURE — 80053 COMPREHEN METABOLIC PANEL: CPT | Performed by: NURSE PRACTITIONER

## 2018-06-16 PROCEDURE — 74176 CT ABD & PELVIS W/O CONTRAST: CPT

## 2018-06-16 PROCEDURE — 85025 COMPLETE CBC W/AUTO DIFF WBC: CPT | Performed by: NURSE PRACTITIONER

## 2018-06-16 PROCEDURE — 25010000002 ONDANSETRON PER 1 MG: Performed by: NURSE PRACTITIONER

## 2018-06-16 PROCEDURE — 83690 ASSAY OF LIPASE: CPT | Performed by: NURSE PRACTITIONER

## 2018-06-16 RX ORDER — SODIUM CHLORIDE 0.9 % (FLUSH) 0.9 %
10 SYRINGE (ML) INJECTION AS NEEDED
Status: DISCONTINUED | OUTPATIENT
Start: 2018-06-16 | End: 2018-06-16 | Stop reason: HOSPADM

## 2018-06-16 RX ORDER — ONDANSETRON 2 MG/ML
4 INJECTION INTRAMUSCULAR; INTRAVENOUS ONCE
Status: COMPLETED | OUTPATIENT
Start: 2018-06-16 | End: 2018-06-16

## 2018-06-16 RX ADMIN — ONDANSETRON 4 MG: 2 INJECTION INTRAMUSCULAR; INTRAVENOUS at 16:41

## 2018-06-17 NOTE — ED PROVIDER NOTES
"Subjective   82-year-old female emergency department complaining of right lower quadrant pain.  This began 4 weeks ago that wax and wane.  This past week the pain has increased.  Patient reports that the pain wakes her up at night.  Denies any dysuria, frequency, nausea, vomiting, diarrhea.        History provided by:  Patient      Review of Systems   Constitutional: Negative for activity change, chills, fatigue and fever.   HENT: Negative for congestion, ear pain, hearing loss, mouth sores, nosebleeds, postnasal drip, sinus pain, sinus pressure and voice change.    Respiratory: Negative for apnea, cough and wheezing.    Cardiovascular: Negative for chest pain and palpitations.   Gastrointestinal: Positive for abdominal pain. Negative for abdominal distention, constipation, nausea and vomiting.   Endocrine: Negative for cold intolerance.   Genitourinary: Negative for difficulty urinating, dysuria and urgency.   Musculoskeletal: Positive for arthralgias.   Skin: Negative for rash.   Allergic/Immunologic: Negative for immunocompromised state.   Neurological: Negative for dizziness, weakness and numbness.   Hematological: Negative for adenopathy.   Psychiatric/Behavioral: Negative for confusion.   All other systems reviewed and are negative.      Past Medical History:   Diagnosis Date   • Osteopetrosis        Allergies   Allergen Reactions   • Contrast Dye      Pt states \" I had red dye here and broke out in rash.\"       Past Surgical History:   Procedure Laterality Date   • ANKLE OPEN REDUCTION INTERNAL FIXATION Right 12/7/2017    Procedure: OPEN REDUCTION INTERNAL FIXATION RIGHT ANKLE       (C-ARM#3);  Surgeon: Marco A Ibrahim MD;  Location: [facility];  Service:    • HYSTERECTOMY         History reviewed. No pertinent family history.    Social History     Social History   • Marital status:      Social History Main Topics   • Smoking status: Never Smoker   • Smokeless tobacco: Never Used   • Alcohol " use No   • Drug use: No   • Sexual activity: Defer     Other Topics Concern   • Not on file           Objective   Physical Exam   Constitutional: She is oriented to person, place, and time. Vital signs are normal. She appears well-developed and well-nourished.   HENT:   Head: Normocephalic.   Nose: Nose normal.   Eyes: Conjunctivae are normal. Pupils are equal, round, and reactive to light.   Neck: Normal range of motion.   Cardiovascular: Normal rate, regular rhythm and normal heart sounds.    Pulmonary/Chest: Effort normal and breath sounds normal.   Abdominal: Soft. There is tenderness in the right lower quadrant.   Musculoskeletal: Normal range of motion.   Neurological: She is alert and oriented to person, place, and time. GCS eye subscore is 4. GCS verbal subscore is 5. GCS motor subscore is 6.   Skin: Skin is warm and dry.   Psychiatric: She has a normal mood and affect.   Nursing note and vitals reviewed.      Procedures           ED Course      ..  Results for orders placed or performed during the hospital encounter of 06/16/18   Comprehensive Metabolic Panel   Result Value Ref Range    Glucose 94 60 - 100 mg/dL    BUN 20 7 - 21 mg/dL    Creatinine 1.04 (H) 0.50 - 1.00 mg/dL    Sodium 144 137 - 145 mmol/L    Potassium 3.8 3.5 - 5.1 mmol/L    Chloride 105 95 - 110 mmol/L    CO2 27.0 22.0 - 31.0 mmol/L    Calcium 10.0 8.4 - 10.2 mg/dL    Total Protein 8.2 6.3 - 8.6 g/dL    Albumin 4.60 3.40 - 4.80 g/dL    ALT (SGPT) 25 9 - 52 U/L    AST (SGOT) 34 14 - 36 U/L    Alkaline Phosphatase 60 38 - 126 U/L    Total Bilirubin 0.5 0.2 - 1.3 mg/dL    eGFR Non African Amer 51 39 - 90 mL/min/1.73    Globulin 3.6 (H) 2.3 - 3.5 gm/dL    A/G Ratio 1.3 1.1 - 1.8 g/dL    BUN/Creatinine Ratio 19.2 7.0 - 25.0    Anion Gap 12.0 5.0 - 15.0 mmol/L   Lipase   Result Value Ref Range    Lipase 97 23 - 300 U/L   Urinalysis With / Microscopic If Indicated (No Culture) - Urine, Clean Catch   Result Value Ref Range    Color, UA Yellow  Yellow, Straw, Dark Yellow, Mary    Appearance, UA Clear Clear    pH, UA 7.0 5.0 - 9.0    Specific [place], UA 1.015 1.003 - 1.030    Glucose, UA Negative Negative    Ketones, UA Negative Negative    Bilirubin, UA Negative Negative    Blood, UA Negative Negative    Protein, UA Negative Negative    Leuk Esterase, UA Small (1+) (A) Negative    Nitrite, UA Negative Negative    Urobilinogen, UA 0.2 E.U./dL 0.2 - 1.0 E.U./dL   CBC Auto Differential   Result Value Ref Range    WBC 8.27 3.20 - 9.80 10*3/mm3    RBC 4.04 3.77 - 5.16 10*6/mm3    Hemoglobin 11.7 (L) 12.0 - 15.5 g/dL    Hematocrit 35.3 35.0 - 45.0 %    MCV 87.4 80.0 - 98.0 fL    MCH 29.0 26.5 - 34.0 pg    MCHC 33.1 31.4 - 36.0 g/dL    RDW 16.0 (H) 11.5 - 14.5 %    RDW-SD 51.5 (H) 36.4 - 46.3 fl    MPV 11.2 8.0 - 12.0 fL    Platelets 282 150 - 450 10*3/mm3    Neutrophil % 57.0 37.0 - 80.0 %    Lymphocyte % 31.2 10.0 - 50.0 %    Monocyte % 7.6 0.0 - 12.0 %    Eosinophil % 3.5 0.0 - 7.0 %    Basophil % 0.6 0.0 - 2.0 %    Immature Grans % 0.1 0.0 - 0.5 %    Neutrophils, Absolute 4.71 2.00 - 8.60 10*3/mm3    Lymphocytes, Absolute 2.58 0.60 - 4.20 10*3/mm3    Monocytes, Absolute 0.63 0.00 - 0.90 10*3/mm3    Eosinophils, Absolute 0.29 0.00 - 0.70 10*3/mm3    Basophils, Absolute 0.05 0.00 - 0.20 10*3/mm3    Immature Grans, Absolute 0.01 0.00 - 0.02 10*3/mm3   Urinalysis, Microscopic Only - Urine, Clean Catch   Result Value Ref Range    RBC, UA 0-2 (A) None Seen /HPF    WBC, UA 6-12 (A) None Seen, 0-2, 3-5 /HPF    Bacteria, UA None Seen None Seen /HPF    Squamous Epithelial Cells, UA None Seen None Seen, 0-2 /HPF    Hyaline Casts, UA None Seen None Seen /LPF    Methodology Automated Microscopy    Light Blue Top   Result Value Ref Range    Extra Tube hold for add-on    Gold Top - SST   Result Value Ref Range    Extra Tube Hold for add-ons.      ..  CT Abdomen Pelvis Without Contrast   Final Result   No CT evidence of acute abdominal or pelvic finding.      Moderate  volume of stool. Diverticulosis without acute   diverticulitis.      Incidental and/or chronic findings as detailed above.      Electronically signed by:  Edwin Torres MD  6/16/2018 6:32 PM   CDT Workstation: [id number]                    MDM  Number of Diagnoses or Management Options     Amount and/or Complexity of Data Reviewed  Clinical lab tests: reviewed  Tests in the radiology section of CPT®: reviewed    Risk of Complications, Morbidity, and/or Mortality  General comments: Explained to patient that there was nothing acute noted on CT regarding her position of pain.          Final diagnoses:   Right lower quadrant abdominal pain            Aren Anand, JUSTINA  06/16/18 1941

## 2018-07-18 DIAGNOSIS — S82.841D CLOSED BIMALLEOLAR FRACTURE OF RIGHT ANKLE WITH ROUTINE HEALING, SUBSEQUENT ENCOUNTER: Primary | ICD-10-CM

## 2018-07-20 ENCOUNTER — OFFICE VISIT (OUTPATIENT)
Dept: ORTHOPEDIC SURGERY | Facility: CLINIC | Age: 83
End: 2018-07-20

## 2018-07-20 VITALS — BODY MASS INDEX: 20.99 KG/M2 | WEIGHT: 126 LBS | HEIGHT: 65 IN

## 2018-07-20 DIAGNOSIS — S82.841D CLOSED BIMALLEOLAR FRACTURE OF RIGHT ANKLE WITH ROUTINE HEALING, SUBSEQUENT ENCOUNTER: Primary | ICD-10-CM

## 2018-07-20 PROCEDURE — 99212 OFFICE O/P EST SF 10 MIN: CPT | Performed by: ORTHOPAEDIC SURGERY

## 2018-07-20 RX ORDER — ALENDRONATE SODIUM 70 MG/1
TABLET ORAL
COMMUNITY
End: 2019-01-01 | Stop reason: HOSPADM

## 2018-07-20 NOTE — PROGRESS NOTES
"Rebecca Zaragoza is a 82 y.o. female returns for     Chief Complaint   Patient presents with   • Right Ankle - Follow-up       HISTORY OF PRESENT ILLNESS: Patient being seen for right ankle follow up. X-rays done today. Patient uses cane for assistance with ambulation.     Doing well, minimal pain, she sometimes uses icy hot for pains of the ankle.         CONCURRENT MEDICAL HISTORY:    Past Medical History:   Diagnosis Date   • Osteopetrosis        Allergies   Allergen Reactions   • Contrast Dye      Pt states \" I had red dye here and broke out in rash.\"         Current Outpatient Prescriptions:   •  alendronate (FOSAMAX) 70 MG tablet, alendronate 70 mg tablet  TAKE 1 TABLET EVERY WEEK, Disp: , Rfl:     Past Surgical History:   Procedure Laterality Date   • ANKLE OPEN REDUCTION INTERNAL FIXATION Right 12/7/2017    Procedure: OPEN REDUCTION INTERNAL FIXATION RIGHT ANKLE       (C-ARM#3);  Surgeon: Marco A Ibrahim MD;  Location: [facility];  Service:    • HYSTERECTOMY         ROS  No fevers or chills.  No chest pain or shortness of air.  No GI or  disturbances.    PHYSICAL EXAMINATION:       Ht 165.1 cm (65\")   Wt 57.2 kg (126 lb)   BMI 20.97 kg/m²     Physical Exam    GAIT:     []  Normal  []  Antalgic    Assistive device: []  None  []  Walker     []  Crutches  [x]  Cane     []  Wheelchair  []  Stretcher    Ortho Exam    Ambulating with cane.  Minimal limp  Swelling minimal  Ankle plantarflexion 20  Ankle dorsiflexion 5  Ankle eversion and inversion is good.    No tenderness with palpation of the ankle region.      Xr Ankle 3+ View Right    Result Date: 7/20/2018  Narrative: Ordering Provider:  Marco A Ibrahim MD Ordering Diagnosis/Indication:  Closed bimalleolar fracture of right ankle with routine healing, subsequent encounter Procedure:  XR ANKLE 3+ VW RIGHT Exam Date:  7/20/18 RELEVANT PRIOR IMAGES:  XR Ankle 3+ View Right 03/30/2018 [id number] Final COMPARISON:  Todays X-rays were " compared to previous images dated 3/30/2018.     Impression:  ankle fracture is aligned, healed, fracture appliance in position, ankle joint space concentric Bone quality: poor Alignment: good Fracture: bimalleolar ankle fracture Soft tissue: normal, no swell             ASSESSMENT:    Diagnoses and all orders for this visit:    Closed bimalleolar fracture of right ankle with routine healing, subsequent encounter    Other orders  -     alendronate (FOSAMAX) 70 MG tablet; alendronate 70 mg tablet   TAKE 1 TABLET EVERY WEEK          PLAN    Activity as tolerated, weight bearing as tolerated.        Marco A Ibrahim MD

## 2018-07-24 ENCOUNTER — HOSPITAL ENCOUNTER (OUTPATIENT)
Dept: ULTRASOUND IMAGING | Facility: HOSPITAL | Age: 83
Discharge: HOME | End: 2018-07-24
Admitting: NURSE PRACTITIONER

## 2018-07-24 DIAGNOSIS — N18.30 CHRONIC KIDNEY DISEASE, STAGE III (MODERATE) (HCC): ICD-10-CM

## 2018-07-24 DIAGNOSIS — N28.1 CYST OF KIDNEY, ACQUIRED: ICD-10-CM

## 2018-07-24 DIAGNOSIS — N39.0 URINARY TRACT INFECTION WITHOUT HEMATURIA, SITE UNSPECIFIED: ICD-10-CM

## 2018-07-24 DIAGNOSIS — I10 HYPERTENSION, ESSENTIAL: ICD-10-CM

## 2018-07-24 PROCEDURE — 76775 US EXAM ABDO BACK WALL LIM: CPT

## 2019-01-01 ENCOUNTER — OFFICE VISIT (OUTPATIENT)
Dept: ORTHOPEDIC SURGERY | Facility: CLINIC | Age: 84
End: 2019-01-01

## 2019-01-01 ENCOUNTER — HOSPITAL ENCOUNTER (OUTPATIENT)
Facility: HOSPITAL | Age: 84
Setting detail: OBSERVATION
Discharge: HOSPICE | End: 2019-08-12
Attending: EMERGENCY MEDICINE | Admitting: FAMILY MEDICINE

## 2019-01-01 ENCOUNTER — APPOINTMENT (OUTPATIENT)
Dept: MRI IMAGING | Facility: HOSPITAL | Age: 84
End: 2019-01-01

## 2019-01-01 ENCOUNTER — APPOINTMENT (OUTPATIENT)
Dept: CT IMAGING | Facility: HOSPITAL | Age: 84
End: 2019-01-01

## 2019-01-01 VITALS
TEMPERATURE: 97.3 F | RESPIRATION RATE: 16 BRPM | OXYGEN SATURATION: 95 % | HEART RATE: 79 BPM | BODY MASS INDEX: 19.98 KG/M2 | SYSTOLIC BLOOD PRESSURE: 144 MMHG | DIASTOLIC BLOOD PRESSURE: 63 MMHG | WEIGHT: 119.9 LBS | HEIGHT: 65 IN

## 2019-01-01 VITALS — BODY MASS INDEX: 20.99 KG/M2 | HEIGHT: 65 IN | WEIGHT: 126 LBS

## 2019-01-01 DIAGNOSIS — S22.080A CLOSED WEDGE COMPRESSION FRACTURE OF ELEVENTH THORACIC VERTEBRA, INITIAL ENCOUNTER: ICD-10-CM

## 2019-01-01 DIAGNOSIS — C41.2: Primary | ICD-10-CM

## 2019-01-01 DIAGNOSIS — M54.5 LOW BACK PAIN, UNSPECIFIED BACK PAIN LATERALITY, UNSPECIFIED CHRONICITY, WITH SCIATICA PRESENCE UNSPECIFIED: Primary | ICD-10-CM

## 2019-01-01 DIAGNOSIS — R74.8 ELEVATED LIVER ENZYMES: ICD-10-CM

## 2019-01-01 LAB
ALBUMIN SERPL-MCNC: 3 G/DL (ref 3.5–5.2)
ALBUMIN SERPL-MCNC: 3.1 G/DL (ref 3.5–5.2)
ALBUMIN SERPL-MCNC: 3.2 G/DL (ref 3.5–5.2)
ALBUMIN SERPL-MCNC: 3.3 G/DL (ref 3.5–5.2)
ALBUMIN/GLOB SERPL: 1.3 G/DL
ALBUMIN/GLOB SERPL: 1.3 G/DL
ALBUMIN/GLOB SERPL: 1.4 G/DL
ALBUMIN/GLOB SERPL: 1.4 G/DL
ALP SERPL-CCNC: 440 U/L (ref 39–117)
ALP SERPL-CCNC: 445 U/L (ref 39–117)
ALP SERPL-CCNC: 472 U/L (ref 39–117)
ALP SERPL-CCNC: 510 U/L (ref 39–117)
ALT SERPL W P-5'-P-CCNC: 126 U/L (ref 1–33)
ALT SERPL W P-5'-P-CCNC: 129 U/L (ref 1–33)
ALT SERPL W P-5'-P-CCNC: 137 U/L (ref 1–33)
ALT SERPL W P-5'-P-CCNC: 143 U/L (ref 1–33)
ANION GAP SERPL CALCULATED.3IONS-SCNC: 12 MMOL/L (ref 5–15)
ANION GAP SERPL CALCULATED.3IONS-SCNC: 14 MMOL/L (ref 5–15)
ANION GAP SERPL CALCULATED.3IONS-SCNC: 16 MMOL/L (ref 5–15)
ANION GAP SERPL CALCULATED.3IONS-SCNC: 20 MMOL/L (ref 5–15)
AST SERPL-CCNC: 182 U/L (ref 1–32)
AST SERPL-CCNC: 199 U/L (ref 1–32)
AST SERPL-CCNC: 208 U/L (ref 1–32)
AST SERPL-CCNC: 210 U/L (ref 1–32)
BACTERIA UR QL AUTO: ABNORMAL /HPF
BASOPHILS # BLD AUTO: 0.02 10*3/MM3 (ref 0–0.2)
BASOPHILS # BLD AUTO: 0.02 10*3/MM3 (ref 0–0.2)
BASOPHILS # BLD AUTO: 0.03 10*3/MM3 (ref 0–0.2)
BASOPHILS NFR BLD AUTO: 0.1 % (ref 0–1.5)
BASOPHILS NFR BLD AUTO: 0.1 % (ref 0–1.5)
BASOPHILS NFR BLD AUTO: 0.2 % (ref 0–1.5)
BILIRUB SERPL-MCNC: 4.4 MG/DL (ref 0.2–1.2)
BILIRUB SERPL-MCNC: 4.8 MG/DL (ref 0.2–1.2)
BILIRUB SERPL-MCNC: 4.9 MG/DL (ref 0.2–1.2)
BILIRUB SERPL-MCNC: 5.2 MG/DL (ref 0.2–1.2)
BILIRUB UR QL STRIP: ABNORMAL
BUN BLD-MCNC: 21 MG/DL (ref 8–23)
BUN BLD-MCNC: 24 MG/DL (ref 8–23)
BUN BLD-MCNC: 27 MG/DL (ref 8–23)
BUN BLD-MCNC: 31 MG/DL (ref 8–23)
BUN/CREAT SERPL: 18.4 (ref 7–25)
BUN/CREAT SERPL: 18.4 (ref 7–25)
BUN/CREAT SERPL: 19.4 (ref 7–25)
BUN/CREAT SERPL: 21.1 (ref 7–25)
CALCIUM SPEC-SCNC: 8 MG/DL (ref 8.6–10.5)
CALCIUM SPEC-SCNC: 8.1 MG/DL (ref 8.6–10.5)
CALCIUM SPEC-SCNC: 8.4 MG/DL (ref 8.6–10.5)
CALCIUM SPEC-SCNC: 8.7 MG/DL (ref 8.6–10.5)
CHLORIDE SERPL-SCNC: 100 MMOL/L (ref 98–107)
CHLORIDE SERPL-SCNC: 104 MMOL/L (ref 98–107)
CHLORIDE SERPL-SCNC: 106 MMOL/L (ref 98–107)
CHLORIDE SERPL-SCNC: 109 MMOL/L (ref 98–107)
CLARITY UR: ABNORMAL
CO2 SERPL-SCNC: 22 MMOL/L (ref 22–29)
CO2 SERPL-SCNC: 23 MMOL/L (ref 22–29)
COLOR UR: ABNORMAL
CREAT BLD-MCNC: 1.14 MG/DL (ref 0.57–1)
CREAT BLD-MCNC: 1.24 MG/DL (ref 0.57–1)
CREAT BLD-MCNC: 1.47 MG/DL (ref 0.57–1)
CREAT BLD-MCNC: 1.47 MG/DL (ref 0.57–1)
DEPRECATED RDW RBC AUTO: 58.8 FL (ref 37–54)
DEPRECATED RDW RBC AUTO: 60.3 FL (ref 37–54)
DEPRECATED RDW RBC AUTO: 60.3 FL (ref 37–54)
DEPRECATED RDW RBC AUTO: 63.8 FL (ref 37–54)
EOSINOPHIL # BLD AUTO: 0 10*3/MM3 (ref 0–0.4)
EOSINOPHIL # BLD AUTO: 0.02 10*3/MM3 (ref 0–0.4)
EOSINOPHIL # BLD AUTO: 0.03 10*3/MM3 (ref 0–0.4)
EOSINOPHIL # BLD MANUAL: 0.45 10*3/MM3 (ref 0–0.4)
EOSINOPHIL NFR BLD AUTO: 0 % (ref 0.3–6.2)
EOSINOPHIL NFR BLD AUTO: 0.1 % (ref 0.3–6.2)
EOSINOPHIL NFR BLD AUTO: 0.2 % (ref 0.3–6.2)
EOSINOPHIL NFR BLD MANUAL: 3 % (ref 0.3–6.2)
ERYTHROCYTE [DISTWIDTH] IN BLOOD BY AUTOMATED COUNT: 19.1 % (ref 12.3–15.4)
ERYTHROCYTE [DISTWIDTH] IN BLOOD BY AUTOMATED COUNT: 19.8 % (ref 12.3–15.4)
ERYTHROCYTE [DISTWIDTH] IN BLOOD BY AUTOMATED COUNT: 20.3 % (ref 12.3–15.4)
ERYTHROCYTE [DISTWIDTH] IN BLOOD BY AUTOMATED COUNT: 20.9 % (ref 12.3–15.4)
GFR SERPL CREATININE-BSD FRML MDRD: 34 ML/MIN/1.73
GFR SERPL CREATININE-BSD FRML MDRD: 34 ML/MIN/1.73
GFR SERPL CREATININE-BSD FRML MDRD: 41 ML/MIN/1.73
GFR SERPL CREATININE-BSD FRML MDRD: 46 ML/MIN/1.73
GLOBULIN UR ELPH-MCNC: 2.2 GM/DL
GLOBULIN UR ELPH-MCNC: 2.3 GM/DL
GLOBULIN UR ELPH-MCNC: 2.3 GM/DL
GLOBULIN UR ELPH-MCNC: 2.6 GM/DL
GLUCOSE BLD-MCNC: 117 MG/DL (ref 65–99)
GLUCOSE BLD-MCNC: 119 MG/DL (ref 65–99)
GLUCOSE BLD-MCNC: 132 MG/DL (ref 65–99)
GLUCOSE BLD-MCNC: 144 MG/DL (ref 65–99)
GLUCOSE UR STRIP-MCNC: NEGATIVE MG/DL
HCT VFR BLD AUTO: 30.1 % (ref 34–46.6)
HCT VFR BLD AUTO: 30.4 % (ref 34–46.6)
HCT VFR BLD AUTO: 30.8 % (ref 34–46.6)
HCT VFR BLD AUTO: 31.8 % (ref 34–46.6)
HGB BLD-MCNC: 10.1 G/DL (ref 12–15.9)
HGB BLD-MCNC: 10.2 G/DL (ref 12–15.9)
HGB BLD-MCNC: 10.2 G/DL (ref 12–15.9)
HGB BLD-MCNC: 10.6 G/DL (ref 12–15.9)
HGB UR QL STRIP.AUTO: NEGATIVE
HOLD SPECIMEN: NORMAL
HYALINE CASTS UR QL AUTO: ABNORMAL /LPF
IMM GRANULOCYTES # BLD AUTO: 0.48 10*3/MM3 (ref 0–0.05)
IMM GRANULOCYTES # BLD AUTO: 0.54 10*3/MM3 (ref 0–0.05)
IMM GRANULOCYTES # BLD AUTO: 0.58 10*3/MM3 (ref 0–0.05)
IMM GRANULOCYTES NFR BLD AUTO: 3.1 % (ref 0–0.5)
IMM GRANULOCYTES NFR BLD AUTO: 3.7 % (ref 0–0.5)
IMM GRANULOCYTES NFR BLD AUTO: 4 % (ref 0–0.5)
KETONES UR QL STRIP: ABNORMAL
LEUKOCYTE ESTERASE UR QL STRIP.AUTO: ABNORMAL
LYMPHOCYTES # BLD AUTO: 0.83 10*3/MM3 (ref 0.7–3.1)
LYMPHOCYTES # BLD AUTO: 0.86 10*3/MM3 (ref 0.7–3.1)
LYMPHOCYTES # BLD AUTO: 0.92 10*3/MM3 (ref 0.7–3.1)
LYMPHOCYTES # BLD MANUAL: 0.6 10*3/MM3 (ref 0.7–3.1)
LYMPHOCYTES NFR BLD AUTO: 5.6 % (ref 19.6–45.3)
LYMPHOCYTES NFR BLD AUTO: 5.7 % (ref 19.6–45.3)
LYMPHOCYTES NFR BLD AUTO: 6.3 % (ref 19.6–45.3)
LYMPHOCYTES NFR BLD MANUAL: 4 % (ref 19.6–45.3)
LYMPHOCYTES NFR BLD MANUAL: 6 % (ref 5–12)
MCH RBC QN AUTO: 29.4 PG (ref 26.6–33)
MCH RBC QN AUTO: 29.5 PG (ref 26.6–33)
MCH RBC QN AUTO: 29.9 PG (ref 26.6–33)
MCH RBC QN AUTO: 30 PG (ref 26.6–33)
MCHC RBC AUTO-ENTMCNC: 33.1 G/DL (ref 31.5–35.7)
MCHC RBC AUTO-ENTMCNC: 33.3 G/DL (ref 31.5–35.7)
MCHC RBC AUTO-ENTMCNC: 33.6 G/DL (ref 31.5–35.7)
MCHC RBC AUTO-ENTMCNC: 33.6 G/DL (ref 31.5–35.7)
MCV RBC AUTO: 87.8 FL (ref 79–97)
MCV RBC AUTO: 88.6 FL (ref 79–97)
MCV RBC AUTO: 89.4 FL (ref 79–97)
MCV RBC AUTO: 90.3 FL (ref 79–97)
METAMYELOCYTES NFR BLD MANUAL: 3 % (ref 0–0)
MONOCYTES # BLD AUTO: 0.6 10*3/MM3 (ref 0.1–0.9)
MONOCYTES # BLD AUTO: 0.65 10*3/MM3 (ref 0.1–0.9)
MONOCYTES # BLD AUTO: 0.68 10*3/MM3 (ref 0.1–0.9)
MONOCYTES # BLD AUTO: 0.9 10*3/MM3 (ref 0.1–0.9)
MONOCYTES NFR BLD AUTO: 4.1 % (ref 5–12)
MONOCYTES NFR BLD AUTO: 4.4 % (ref 5–12)
MONOCYTES NFR BLD AUTO: 4.5 % (ref 5–12)
NEUTROPHILS # BLD AUTO: 12.49 10*3/MM3 (ref 1.7–7)
NEUTROPHILS # BLD AUTO: 12.52 10*3/MM3 (ref 1.7–7)
NEUTROPHILS # BLD AUTO: 12.62 10*3/MM3 (ref 1.7–7)
NEUTROPHILS # BLD AUTO: 13.33 10*3/MM3 (ref 1.7–7)
NEUTROPHILS NFR BLD AUTO: 85.4 % (ref 42.7–76)
NEUTROPHILS NFR BLD AUTO: 85.8 % (ref 42.7–76)
NEUTROPHILS NFR BLD AUTO: 86.7 % (ref 42.7–76)
NEUTROPHILS NFR BLD MANUAL: 83 % (ref 42.7–76)
NEUTS BAND NFR BLD MANUAL: 1 % (ref 0–5)
NITRITE UR QL STRIP: NEGATIVE
NRBC BLD AUTO-RTO: 0.5 /100 WBC (ref 0–0.2)
NRBC SPEC MANUAL: 1 /100 WBC (ref 0–0.2)
PH UR STRIP.AUTO: 5.5 [PH] (ref 5–9)
PLATELET # BLD AUTO: 102 10*3/MM3 (ref 140–450)
PLATELET # BLD AUTO: 121 10*3/MM3 (ref 140–450)
PLATELET # BLD AUTO: 130 10*3/MM3 (ref 140–450)
PLATELET # BLD AUTO: 153 10*3/MM3 (ref 140–450)
PMV BLD AUTO: 12.1 FL (ref 6–12)
PMV BLD AUTO: 12.1 FL (ref 6–12)
PMV BLD AUTO: 12.2 FL (ref 6–12)
PMV BLD AUTO: 12.3 FL (ref 6–12)
POLYCHROMASIA BLD QL SMEAR: ABNORMAL
POTASSIUM BLD-SCNC: 3.3 MMOL/L (ref 3.5–5.2)
POTASSIUM BLD-SCNC: 3.5 MMOL/L (ref 3.5–5.2)
POTASSIUM BLD-SCNC: 3.6 MMOL/L (ref 3.5–5.2)
POTASSIUM BLD-SCNC: 3.7 MMOL/L (ref 3.5–5.2)
PROT SERPL-MCNC: 5.2 G/DL (ref 6–8.5)
PROT SERPL-MCNC: 5.4 G/DL (ref 6–8.5)
PROT SERPL-MCNC: 5.5 G/DL (ref 6–8.5)
PROT SERPL-MCNC: 5.9 G/DL (ref 6–8.5)
PROT UR QL STRIP: ABNORMAL
RBC # BLD AUTO: 3.4 10*6/MM3 (ref 3.77–5.28)
RBC # BLD AUTO: 3.41 10*6/MM3 (ref 3.77–5.28)
RBC # BLD AUTO: 3.43 10*6/MM3 (ref 3.77–5.28)
RBC # BLD AUTO: 3.59 10*6/MM3 (ref 3.77–5.28)
RBC # UR: ABNORMAL /HPF
REF LAB TEST METHOD: ABNORMAL
SMALL PLATELETS BLD QL SMEAR: ABNORMAL
SODIUM BLD-SCNC: 141 MMOL/L (ref 136–145)
SODIUM BLD-SCNC: 142 MMOL/L (ref 136–145)
SODIUM BLD-SCNC: 142 MMOL/L (ref 136–145)
SODIUM BLD-SCNC: 145 MMOL/L (ref 136–145)
SP GR UR STRIP: 1.02 (ref 1–1.03)
SQUAMOUS #/AREA URNS HPF: ABNORMAL /HPF
UROBILINOGEN UR QL STRIP: ABNORMAL
WBC MORPH BLD: NORMAL
WBC NRBC COR # BLD: 14.56 10*3/MM3 (ref 3.4–10.8)
WBC NRBC COR # BLD: 14.66 10*3/MM3 (ref 3.4–10.8)
WBC NRBC COR # BLD: 15.02 10*3/MM3 (ref 3.4–10.8)
WBC NRBC COR # BLD: 15.38 10*3/MM3 (ref 3.4–10.8)
WBC UR QL AUTO: ABNORMAL /HPF
WHOLE BLOOD HOLD SPECIMEN: NORMAL

## 2019-01-01 PROCEDURE — G0378 HOSPITAL OBSERVATION PER HR: HCPCS

## 2019-01-01 PROCEDURE — 85025 COMPLETE CBC W/AUTO DIFF WBC: CPT | Performed by: PHYSICIAN ASSISTANT

## 2019-01-01 PROCEDURE — 81001 URINALYSIS AUTO W/SCOPE: CPT | Performed by: PHYSICIAN ASSISTANT

## 2019-01-01 PROCEDURE — 85025 COMPLETE CBC W/AUTO DIFF WBC: CPT | Performed by: FAMILY MEDICINE

## 2019-01-01 PROCEDURE — 85007 BL SMEAR W/DIFF WBC COUNT: CPT | Performed by: FAMILY MEDICINE

## 2019-01-01 PROCEDURE — 25010000002 MORPHINE PER 10 MG: Performed by: FAMILY MEDICINE

## 2019-01-01 PROCEDURE — 74176 CT ABD & PELVIS W/O CONTRAST: CPT

## 2019-01-01 PROCEDURE — 99213 OFFICE O/P EST LOW 20 MIN: CPT | Performed by: ORTHOPAEDIC SURGERY

## 2019-01-01 PROCEDURE — 80053 COMPREHEN METABOLIC PANEL: CPT | Performed by: INTERNAL MEDICINE

## 2019-01-01 PROCEDURE — 80053 COMPREHEN METABOLIC PANEL: CPT | Performed by: PHYSICIAN ASSISTANT

## 2019-01-01 PROCEDURE — 96374 THER/PROPH/DIAG INJ IV PUSH: CPT

## 2019-01-01 PROCEDURE — 96376 TX/PRO/DX INJ SAME DRUG ADON: CPT

## 2019-01-01 PROCEDURE — 99205 OFFICE O/P NEW HI 60 MIN: CPT | Performed by: INTERNAL MEDICINE

## 2019-01-01 PROCEDURE — 72148 MRI LUMBAR SPINE W/O DYE: CPT

## 2019-01-01 PROCEDURE — 99284 EMERGENCY DEPT VISIT MOD MDM: CPT

## 2019-01-01 PROCEDURE — 99214 OFFICE O/P EST MOD 30 MIN: CPT | Performed by: INTERNAL MEDICINE

## 2019-01-01 RX ORDER — IBUPROFEN 200 MG
200 TABLET ORAL EVERY 6 HOURS PRN
COMMUNITY
End: 2019-01-01 | Stop reason: HOSPADM

## 2019-01-01 RX ORDER — SODIUM CHLORIDE 0.9 % (FLUSH) 0.9 %
10 SYRINGE (ML) INJECTION AS NEEDED
Status: DISCONTINUED | OUTPATIENT
Start: 2019-01-01 | End: 2019-01-01 | Stop reason: HOSPADM

## 2019-01-01 RX ORDER — HYDROCODONE BITARTRATE AND ACETAMINOPHEN 5; 325 MG/1; MG/1
1 TABLET ORAL EVERY 4 HOURS PRN
Status: DISCONTINUED | OUTPATIENT
Start: 2019-01-01 | End: 2019-01-01

## 2019-01-01 RX ORDER — BISACODYL 10 MG
10 SUPPOSITORY, RECTAL RECTAL DAILY
Qty: 30 SUPPOSITORY | Refills: 0 | Status: SHIPPED | OUTPATIENT
Start: 2019-01-01

## 2019-01-01 RX ORDER — SODIUM CHLORIDE 9 MG/ML
50 INJECTION, SOLUTION INTRAVENOUS CONTINUOUS
Status: DISPENSED | OUTPATIENT
Start: 2019-01-01 | End: 2019-01-01

## 2019-01-01 RX ORDER — DIPHENHYDRAMINE HCL 12.5MG/5ML
12.5 LIQUID (ML) ORAL NIGHTLY PRN
Status: DISCONTINUED | OUTPATIENT
Start: 2019-01-01 | End: 2019-01-01 | Stop reason: HOSPADM

## 2019-01-01 RX ORDER — POTASSIUM CHLORIDE 1.5 G/1.77G
40 POWDER, FOR SOLUTION ORAL ONCE
Status: COMPLETED | OUTPATIENT
Start: 2019-01-01 | End: 2019-01-01

## 2019-01-01 RX ORDER — SODIUM CHLORIDE 0.9 % (FLUSH) 0.9 %
3-10 SYRINGE (ML) INJECTION AS NEEDED
Status: DISCONTINUED | OUTPATIENT
Start: 2019-01-01 | End: 2019-01-01 | Stop reason: HOSPADM

## 2019-01-01 RX ORDER — ONDANSETRON 2 MG/ML
4 INJECTION INTRAMUSCULAR; INTRAVENOUS EVERY 6 HOURS PRN
Status: DISCONTINUED | OUTPATIENT
Start: 2019-01-01 | End: 2019-01-01 | Stop reason: HOSPADM

## 2019-01-01 RX ORDER — HYDROCODONE BITARTRATE AND ACETAMINOPHEN 5; 325 MG/1; MG/1
1 TABLET ORAL ONCE
Status: COMPLETED | OUTPATIENT
Start: 2019-01-01 | End: 2019-01-01

## 2019-01-01 RX ORDER — DIPHENHYDRAMINE HCL 12.5MG/5ML
12.5 LIQUID (ML) ORAL EVERY 6 HOURS PRN
Status: DISCONTINUED | OUTPATIENT
Start: 2019-01-01 | End: 2019-01-01

## 2019-01-01 RX ORDER — TRAMADOL HYDROCHLORIDE 50 MG/1
50 TABLET ORAL EVERY 8 HOURS PRN
Qty: 30 TABLET | Refills: 0 | OUTPATIENT
Start: 2019-01-01 | End: 2019-01-01 | Stop reason: HOSPADM

## 2019-01-01 RX ORDER — MORPHINE SULFATE 100 MG/5ML
5 SOLUTION ORAL
Qty: 30 ML | Refills: 0 | Status: SHIPPED | OUTPATIENT
Start: 2019-01-01

## 2019-01-01 RX ORDER — LORAZEPAM 2 MG/ML
0.5 CONCENTRATE ORAL EVERY 8 HOURS PRN
Qty: 30 ML | Refills: 0 | Status: SHIPPED | OUTPATIENT
Start: 2019-01-01

## 2019-01-01 RX ORDER — ONDANSETRON 4 MG/1
4 TABLET, ORALLY DISINTEGRATING ORAL EVERY 8 HOURS PRN
Qty: 30 TABLET | Refills: 0 | Status: SHIPPED | OUTPATIENT
Start: 2019-01-01

## 2019-01-01 RX ORDER — FAMOTIDINE 20 MG/1
20 TABLET, FILM COATED ORAL DAILY
Status: DISCONTINUED | OUTPATIENT
Start: 2019-01-01 | End: 2019-01-01 | Stop reason: HOSPADM

## 2019-01-01 RX ORDER — MORPHINE SULFATE 2 MG/ML
1 INJECTION, SOLUTION INTRAMUSCULAR; INTRAVENOUS EVERY 6 HOURS PRN
Status: DISCONTINUED | OUTPATIENT
Start: 2019-01-01 | End: 2019-01-01 | Stop reason: HOSPADM

## 2019-01-01 RX ORDER — SODIUM CHLORIDE 0.9 % (FLUSH) 0.9 %
3 SYRINGE (ML) INJECTION EVERY 12 HOURS SCHEDULED
Status: DISCONTINUED | OUTPATIENT
Start: 2019-01-01 | End: 2019-01-01 | Stop reason: HOSPADM

## 2019-01-01 RX ADMIN — SODIUM CHLORIDE, PRESERVATIVE FREE 3 ML: 5 INJECTION INTRAVENOUS at 21:32

## 2019-01-01 RX ADMIN — HYDROCODONE BITARTRATE AND ACETAMINOPHEN 1 TABLET: 5; 325 TABLET ORAL at 11:19

## 2019-01-01 RX ADMIN — FAMOTIDINE 20 MG: 20 TABLET ORAL at 08:34

## 2019-01-01 RX ADMIN — SODIUM CHLORIDE 1000 ML: 900 INJECTION, SOLUTION INTRAVENOUS at 11:55

## 2019-01-01 RX ADMIN — HYDROCODONE BITARTRATE AND ACETAMINOPHEN 1 TABLET: 5; 325 TABLET ORAL at 20:59

## 2019-01-01 RX ADMIN — FAMOTIDINE 20 MG: 20 TABLET ORAL at 08:31

## 2019-01-01 RX ADMIN — MORPHINE SULFATE 1 MG: 2 INJECTION, SOLUTION INTRAMUSCULAR; INTRAVENOUS at 10:56

## 2019-01-01 RX ADMIN — FAMOTIDINE 20 MG: 20 TABLET ORAL at 08:16

## 2019-01-01 RX ADMIN — MORPHINE SULFATE 1 MG: 2 INJECTION, SOLUTION INTRAMUSCULAR; INTRAVENOUS at 04:57

## 2019-01-01 RX ADMIN — SODIUM CHLORIDE 50 ML/HR: 9 INJECTION, SOLUTION INTRAVENOUS at 11:45

## 2019-01-01 RX ADMIN — SODIUM CHLORIDE, PRESERVATIVE FREE 3 ML: 5 INJECTION INTRAVENOUS at 08:31

## 2019-01-01 RX ADMIN — MORPHINE SULFATE 1 MG: 2 INJECTION, SOLUTION INTRAMUSCULAR; INTRAVENOUS at 21:24

## 2019-01-01 RX ADMIN — SODIUM CHLORIDE 50 ML/HR: 9 INJECTION, SOLUTION INTRAVENOUS at 16:41

## 2019-01-01 RX ADMIN — FAMOTIDINE 20 MG: 20 TABLET ORAL at 17:51

## 2019-01-01 RX ADMIN — DIPHENHYDRAMINE HYDROCHLORIDE 12.5 MG: 25 SOLUTION ORAL at 00:08

## 2019-01-01 RX ADMIN — SODIUM CHLORIDE, PRESERVATIVE FREE 3 ML: 5 INJECTION INTRAVENOUS at 08:16

## 2019-01-01 RX ADMIN — POTASSIUM CHLORIDE 40 MEQ: 1.5 POWDER, FOR SOLUTION ORAL at 17:51

## 2019-01-01 RX ADMIN — SODIUM CHLORIDE, PRESERVATIVE FREE 3 ML: 5 INJECTION INTRAVENOUS at 08:34

## 2019-08-02 NOTE — PROGRESS NOTES
"Rebecca Zaragoza is a 83 y.o. female   Primary provider:  Juanis Ivy APRN       Chief Complaint   Patient presents with   • Lumbar Spine - Pain   • Establish Care        HISTORY OF PRESENT ILLNESS: Patient being seen for low back pain. X-rays done today.  This is been going for 3 weeks.  She had been on a trip to and after grandson and the next day noticed increased pain in her back and hurts with weightbearing or sitting is better with lying flat on her couch.  She has history of fracture in the past but is unsure when.  She recalls she may have had a fracture of her coccyx and was treated by Dr. Ibrahim in the past.  Not associated with any bowel or bladder problems.    Pain   This is a chronic problem. The current episode started more than 1 year ago. Associated symptoms include joint swelling. Pertinent negatives include no abdominal pain, chest pain, chills, fever, nausea or vomiting. Associated symptoms comments: aching. The symptoms are aggravated by standing. She has tried acetaminophen and NSAIDs for the symptoms.        CONCURRENT MEDICAL HISTORY:    Past Medical History:   Diagnosis Date   • Osteopetrosis        Allergies   Allergen Reactions   • Contrast Dye      Pt states \" I had red dye here and broke out in rash.\"   • Iodine Rash         Current Outpatient Medications:   •  alendronate (FOSAMAX) 70 MG tablet, alendronate 70 mg tablet  TAKE 1 TABLET EVERY WEEK, Disp: , Rfl:   •  ibuprofen (ADVIL,MOTRIN) 200 MG tablet, Take 200 mg by mouth Every 6 (Six) Hours As Needed for Mild Pain ., Disp: , Rfl:   •  traMADol (ULTRAM) 50 MG tablet, Take 1 tablet by mouth Every 8 (Eight) Hours As Needed for Moderate Pain  for up to 10 days., Disp: 30 tablet, Rfl: 0    Past Surgical History:   Procedure Laterality Date   • ANKLE OPEN REDUCTION INTERNAL FIXATION Right 12/7/2017    Procedure: OPEN REDUCTION INTERNAL FIXATION RIGHT ANKLE       (C-ARM#3);  Surgeon: Marco A Ibrahim MD;  Location: [facility]" "[facility];  Service:    • HYSTERECTOMY         Family History   Problem Relation Age of Onset   • Heart disease Other    • Hypertension Other         Social History     Socioeconomic History   • Marital status:      Spouse name: Not on file   • Number of children: Not on file   • Years of education: Not on file   • Highest education level: Not on file   Tobacco Use   • Smoking status: Never Smoker   • Smokeless tobacco: Never Used   Substance and Sexual Activity   • Alcohol use: No   • Drug use: No   • Sexual activity: Defer        Review of Systems   Constitutional: Positive for unexpected weight change. Negative for chills and fever.   HENT: Positive for tinnitus. Negative for facial swelling.    Eyes:        Dry eyes   Respiratory: Negative for apnea and shortness of breath.    Cardiovascular: Positive for palpitations. Negative for chest pain and leg swelling.   Gastrointestinal: Negative for abdominal pain, nausea and vomiting.   Endocrine: Positive for cold intolerance, heat intolerance and polydipsia.   Genitourinary: Negative for dysuria.   Musculoskeletal: Positive for back pain, gait problem and joint swelling.        Stiffness, muscle pain   Skin: Negative for color change.        itching   Neurological: Positive for dizziness. Negative for seizures and syncope.   Hematological: Negative for adenopathy. Bruises/bleeds easily.   Psychiatric/Behavioral: Negative for dysphoric mood.   All other systems reviewed and are negative.      PHYSICAL EXAMINATION:       Ht 165.1 cm (65\")   Wt 57.2 kg (126 lb)   BMI 20.97 kg/m²     Physical Exam   Constitutional: She is oriented to person, place, and time. She appears well-developed.   HENT:   Head: Normocephalic and atraumatic.   Eyes: EOM are normal. Pupils are equal, round, and reactive to light.   Neck: Neck supple.   Pulmonary/Chest: Effort normal.   Musculoskeletal: She exhibits tenderness and deformity.   Neurological: She is alert and oriented to person, " place, and time.   Skin: Skin is warm and dry.   Psychiatric: She has a normal mood and affect.       GAIT:     []  Normal  []  Antalgic    Assistive device: []  None  []  Walker     []  Crutches  [x]  Cane     [x]  Wheelchair  []  Stretcher    Ortho Exam  About the sacrum bilaterally.  Prominence of spinous process around L1.  Hip range of motion is full without pain particular tender over the trochanter.  Straight raising is negative good strength is noted sensory exam is intact.    No results found.  2 view lumbar spine x-rays show what appear to be chronic L1 fracture.  I can see this on a CT scan that was done in 2018.  This does not appear acute.  Lumbar spondylosis relative osteopenia is seen    ASSESSMENT:    Diagnoses and all orders for this visit:    Low back pain, unspecified back pain laterality, unspecified chronicity, with sciatica presence unspecified  -     MRI Lumbar Spine Without Contrast; Future    Other orders  -     ibuprofen (ADVIL,MOTRIN) 200 MG tablet; Take 200 mg by mouth Every 6 (Six) Hours As Needed for Mild Pain .  -     traMADol (ULTRAM) 50 MG tablet; Take 1 tablet by mouth Every 8 (Eight) Hours As Needed for Moderate Pain  for up to 10 days.          PLAN return at this point is for a sacral insufficiency fracture based on her history and exam.  I am recommending MRI scan from L1 to the sacrum.  I think is medically necessary at this point will get it done as soon as we can see her back after that and develop further treatment plan.  In the meantime I will do wheelchair transfers keep her down in the chair until we can prove what this is.  We will see her back after MRI scan    No Follow-up on file.        This document has been electronically signed by Jose Enrique Bray MD on August 2, 2019 2:47 PM

## 2019-08-09 PROBLEM — C41.2: Status: ACTIVE | Noted: 2019-01-01

## 2019-08-09 NOTE — H&P
HISTORY AND PHYSICAL  NAME: Rebecca Zaragoza  : 1935  MRN: [id number]    DATE OF ADMISSION: 19    DATE & TIME SEEN: 19 2:49 PM    PCP: Juanis Ivy APRN    CODE STATUS:   Code Status and Medical Interventions:   Ordered at: 19 1524     Limited Support to NOT Include:    Intubation    Artificial Nutrition     Code Status:    No CPR     Medical Interventions (Level of Support Prior to Arrest):    Limited       CHIEF COMPLAINT fall    HPI:  Rebecca Zaragoza is a 84 y.o. female presents with recent fall on back pain.  Patient also endorses weakness and difficulty ambulating secondary to low back pain.  Patient denies any fever, chills, nausea, vomiting, chest pain, diaphoresis, or dyspnea.  Patient does endorse some acute on chronic low back pain as well as weakness.    In the emergency department patient was taken to the MRI scanner for L-spine evaluation.  Patient was found to have diffuse multiple bony marrow infiltrative lesions of various size which was suggestive for multiple myeloma or metastatic disease.  Patient also with an acute T11 mild compression fracture.  Patient also had a CT pending at time of admission.    CONCURRENT MEDICAL HISTORY:  Past Medical History:   Diagnosis Date   • Osteopetrosis        PAST SURGICAL HISTORY:  Past Surgical History:   Procedure Laterality Date   • ANKLE OPEN REDUCTION INTERNAL FIXATION Right 2017    Procedure: OPEN REDUCTION INTERNAL FIXATION RIGHT ANKLE       (C-ARM#3);  Surgeon: Marco A Ibrahim MD;  Location: [facility];  Service:    • HYSTERECTOMY         FAMILY HISTORY:  Family History   Problem Relation Age of Onset   • Heart disease Other    • Hypertension Other         SOCIAL HISTORY:  Social History     Socioeconomic History   • Marital status:      Spouse name: Not on file   • Number of children: Not on file   • Years of education: Not on file   • Highest education level: Not on file   Tobacco Use   •  Smoking status: Never Smoker   • Smokeless tobacco: Never Used   Substance and Sexual Activity   • Alcohol use: No   • Drug use: No   • Sexual activity: Defer       HOME MEDICATIONS:  Prior to Admission medications    Medication Sig Start Date End Date Taking? Authorizing Provider   alendronate (FOSAMAX) 70 MG tablet alendronate 70 mg tablet   TAKE 1 TABLET EVERY WEEK    ProviderSpencer MD   ibuprofen (ADVIL,MOTRIN) 200 MG tablet Take 200 mg by mouth Every 6 (Six) Hours As Needed for Mild Pain .    ProviderSpencer MD   traMADol (ULTRAM) 50 MG tablet Take 1 tablet by mouth Every 8 (Eight) Hours As Needed for Moderate Pain  for up to 10 days. 8/2/19 8/12/19  Jose Enrique Bray MD       ALLERGIES:  Contrast dye and Iodine    REVIEW OF SYSTEMS  Review of Systems   Constitutional: Positive for activity change and fatigue. Negative for appetite change and fever.   Respiratory: Negative for cough and shortness of breath.    Cardiovascular: Negative for chest pain and palpitations.   Gastrointestinal: Negative for abdominal pain and nausea.   Genitourinary: Negative for difficulty urinating and dysuria.   Musculoskeletal: Positive for arthralgias, back pain and gait problem.   Skin: Negative for color change and rash.   Neurological: Negative for dizziness, weakness and headaches.   Psychiatric/Behavioral: Negative for agitation, confusion and sleep disturbance.       PHYSICAL EXAM:  Temp:  [97.9 °F (36.6 °C)] 97.9 °F (36.6 °C)  Heart Rate:  [72-87] 72  Resp:  [18] 18  BP: (96-97)/(53-60) 97/53  Body mass index is 20.97 kg/m².     Physical Exam   Constitutional: She is oriented to person, place, and time. She appears well-developed and well-nourished. No distress.   HENT:   Head: Normocephalic and atraumatic.   Right Ear: External ear normal.   Left Ear: External ear normal.   Nose: Nose normal.   Eyes: Conjunctivae and EOM are normal. Pupils are equal, round, and reactive to light.   Neck: Neck supple. No  thyromegaly present.   Cardiovascular: Normal rate, regular rhythm and normal heart sounds.   Pulmonary/Chest: Effort normal and breath sounds normal. No respiratory distress. She has no wheezes. She has no rales. She exhibits no tenderness.   Abdominal: Soft. Bowel sounds are normal. She exhibits no distension and no mass. There is no tenderness. There is no rebound and no guarding.   Musculoskeletal: She exhibits no edema.   Patient can move all 4 extremities.  Patient does have some reduced movement in her bilateral lower extremities that appears symmetrical.  This movement appears to be limited secondary to pain.  Patient is neurovascularly intact distally in her lower extremities.   Neurological: She is alert and oriented to person, place, and time.   Skin: Skin is warm and dry. No rash noted. She is not diaphoretic. No erythema. No pallor.   Psychiatric: She has a normal mood and affect. Her behavior is normal.   Nursing note and vitals reviewed.      DIAGNOSTIC DATA:   Lab Results (last 24 hours)     Procedure Component Value Units Date/Time    Extra Tubes [[id number]] Collected:  08/09/19 1117    Specimen:  Blood, Venous Line Updated:  08/09/19 1230    Narrative:       The following orders were created for panel order Extra Tubes.  Procedure                               Abnormality         Status                     ---------                               -----------         ------                     Light Blue Top[[id number]]                                   Final result               [facility][[id number]]                                   Final result                 Please view results for these tests on the individual orders.    Light Blue Top [[id number]] Collected:  08/09/19 1117    Specimen:  Blood Updated:  08/09/19 1230     Extra Tube hold for add-on     Comment: Auto resulted       Gold Top - [facility] [[id number]] Collected:  08/09/19 1117    Specimen:  Blood Updated:  08/09/19 1230     Extra Tube  Hold for add-ons.     Comment: Auto resulted.       Comprehensive Metabolic Panel [[id number]]  (Abnormal) Collected:  08/09/19 1114    Specimen:  Blood Updated:  08/09/19 1144     Glucose 119 mg/dL      BUN 31 mg/dL      Creatinine 1.47 mg/dL      Sodium 142 mmol/L      Potassium 3.3 mmol/L      Chloride 100 mmol/L      CO2 22.0 mmol/L      Calcium 8.7 mg/dL      Total Protein 5.9 g/dL      Albumin 3.30 g/dL      ALT (SGPT) 143 U/L      AST (SGOT) 199 U/L      Alkaline Phosphatase 440 U/L      Total Bilirubin 4.8 mg/dL      eGFR Non African Amer 34 mL/min/1.73      Globulin 2.6 gm/dL      A/G Ratio 1.3 g/dL      BUN/Creatinine Ratio 21.1     Anion Gap 20.0 mmol/L     Narrative:       GFR Normal >60  Chronic Kidney Disease <60  Kidney Failure <15    CBC & Differential [[id number]] Collected:  08/09/19 1114    Specimen:  Blood Updated:  08/09/19 1120    Narrative:       The following orders were created for panel order CBC & Differential.  Procedure                               Abnormality         Status                     ---------                               -----------         ------                     CBC Auto Differential[[id number]]        Abnormal            Final result                 Please view results for these tests on the individual orders.    CBC Auto Differential [[id number]]  (Abnormal) Collected:  08/09/19 1114    Specimen:  Blood Updated:  08/09/19 1120     WBC 15.38 10*3/mm3      RBC 3.59 10*6/mm3      Hemoglobin 10.6 g/dL      Hematocrit 31.8 %      MCV 88.6 fL      MCH 29.5 pg      MCHC 33.3 g/dL      RDW 19.1 %      RDW-SD 58.8 fl      MPV 12.3 fL      Platelets 153 10*3/mm3      Neutrophil % 86.7 %      Lymphocyte % 5.6 %      Monocyte % 4.4 %      Eosinophil % 0.0 %      Basophil % 0.2 %      Immature Grans % 3.1 %      Neutrophils, Absolute 13.33 10*3/mm3      Lymphocytes, Absolute 0.86 10*3/mm3      Monocytes, Absolute 0.68 10*3/mm3      Eosinophils, Absolute 0.00 10*3/mm3      Basophils,  Absolute 0.03 10*3/mm3      Immature Grans, Absolute 0.48 10*3/mm3      nRBC 0.5 /100 WBC         Estimated Creatinine Clearance: 26.6 mL/min (A) (by C-G formula based on SCr of 1.47 mg/dL (H)).     Imaging Results (last 24 hours)     Procedure Component Value Units Date/Time    MRI Lumbar Spine Without Contrast [[id number]] Collected:  08/09/19 1020     Updated:  08/09/19 1141    Narrative:       Procedure: MRI lumbar spine without contrast    Reason for exam: Low back pain following fall    FINDINGS: Multisequence multiplanar MR imaging of the lumbar  spine was performed without contrast. Old L1 anterior vertebral  body wedge compression fracture with loss of anterior vertebral  body height by 80%. Acute T11 mild compression fracture with mild  loss of diffuse vertebral body height by 15% and associated  posterior vertebral body oblique linear focus of low signal on  all weight sequences with surrounding lower signal on T1  weighting which is higher signal on T2 weighting. No other  evidence of acute fracture or dislocation. Diffuse lumbar spine,  sacrum, and iliac wing multiple bony marrow infiltrative lesions  of varying sizes which are low signal on T1 weighting and higher  signal on fat-suppressed T2 weighting. The conus of the spinal  cord appears normal with tip at the L1 vertebral body level.    T11-T12: Disc space normal. No disc protrusion or extrusion.  Spinal cord and nerve roots exit without compromise.    T12-L1: Mild anterior broad-based disc protrusion. Spinal cord  and nerve roots exit this to space level without compromise.    L1-L2: Disc space normal. No disc protrusion or extrusion. Nerve  roots exit without compromise.    L2-L3: Disc space normal. No disc protrusion or extrusion. Nerve  roots exit without compromise.    L3-L4: Disc space normal. No disc protrusion or extrusion. Nerve  roots exit without compromise.    L4-L5: Disc space normal. No disc protrusion or extrusion. Nerve  roots exit  without compromise.    L5-S1: Disc space normal. No disc protrusion or extrusion. Nerve  roots exit without compromise.      Impression:       1.  Diffuse lumbar spine, sacrum, and iliac wing multiple bony  marrow infiltrative lesions of varying sizes which are low signal  on T1 weighting and higher signal on fat-suppressed T2 weighting.  These lesions are highly suspicious for metastatic disease and/or  multiple myeloma. Recommend clinical correlation.  2.  Acute T11 mild compression fracture with loss of diffuse  vertebral body height by 15%. Associated oblique nondisplaced  fracture line component involving the posterior T11 vertebral  body.  3.  Old L1 anterior vertebral body wedge compression fracture  with loss of anterior vertebral body height by 80%.  4.  T12-L1: Mild anterior broad-based disc protrusion. Spinal  cord and nerve roots exit this to space level without compromise.  5.  Findings discussed with ER physician by phone at 11:38 AM on  8/9/2019.    Electronically signed by:  Brian Nagy MD  8/9/2019 11:40 AM CDT  Workstation: [id number]          I reviewed the patient's new clinical results.    ASSESSMENT AND PLAN: This is a 84 y.o. 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  Fall.  Patient with acute compression fracture as well as likely bone metastasis or multiple myeloma.  Have consulted hematology oncology.    #.  Weakness.  This appears to be acute on chronic.  This was exacerbated with patient's recent fall and back pain.  #.  Back pain.  Acute on chronic.  Likely exacerbated by bony mets or multiple myeloma.  Hematology oncology consulted.  Have discussed the case with orthopedics.  No immediate surgical intervention needed.  Pain medication.  #.  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  Hematology oncology consulted.  Patient and family to discuss options.  #.  Elevated LFTs.  This  is likely due to metastatic disease involving the liver.  Monitor.      Will monitor patient's hospital course and adjust treatment as hospital course dictates.    DVT prophylaxis: SCDs  Code status is   Code Status and Medical Interventions:   Ordered at: 08/09/19 1524     Limited Support to NOT Include:    Intubation    Artificial Nutrition     Code Status:    No CPR     Medical Interventions (Level of Support Prior to Arrest):    Limited        I discussed the patients findings and my recommendations with patient, family, nursing staff and consulting provider.           This document has been electronically signed by Greg Zuniga MD on August 9, 2019 2:49 PM

## 2019-08-09 NOTE — CONSULTS
"DATE OF CONSULT: 8/9/2019      REQUESTING SOURCE: Dr. uZniga      REASON FOR CONSULTATION: Metastatic cancer involving liver, adrenal gland, lung and bone, elevated liver function test, anemia, weight loss      HISTORY OF PRESENT ILLNESS:    84-year-old female with medical problem consisting of back pain secondary to sacral fracture, history of hysterectomy, history of ankle fracture in December 2017 presented to [facility] emergency room today on August 9, 2019 with complaint of fall and severe pain affecting back and hip after fall.  During evaluation patient had MRI of lumbar spine done in the emergency room that showed metastatic lesion involving lumbar spine, sacrum and iliac wing.  Patient was also found to have elevated liver function test.  Hematology oncology has been consulted for recommendation regarding metastatic cancer.  Patient admits to loss of appetite 3 months ago.  Complains of severe worsening of back pain over last 3 months affecting her ability to move around.  Complains of chronic diarrhea.  Denies any new headache or dizziness.  Denies any new lump in the breast.  Denies any new lymph node enlargement.  Denies any personal history of malignancy.        PAST MEDICAL HISTORY:    Past Medical History:   Diagnosis Date   • Osteopetrosis        PAST SURGICAL HISTORY:  Past Surgical History:   Procedure Laterality Date   • ANKLE OPEN REDUCTION INTERNAL FIXATION Right 12/7/2017    Procedure: OPEN REDUCTION INTERNAL FIXATION RIGHT ANKLE       (C-ARM#3);  Surgeon: Marco A Ibrahim MD;  Location: [facility];  Service:    • FRACTURE SURGERY     • HYSTERECTOMY         ALLERGIES:    Allergies   Allergen Reactions   • Contrast Dye      Pt states \" I had red dye here and broke out in rash.\"   • Iodine Rash       SOCIAL HISTORY:   Social History     Tobacco Use   • Smoking status: Never Smoker   • Smokeless tobacco: Never Used   Substance Use Topics   • Alcohol use: No   • Drug use: No "       CURRENT MEDICATIONS:    Current Facility-Administered Medications   Medication Dose Route Frequency Provider Last Rate Last Dose   • famotidine (PEPCID) tablet 20 mg  20 mg Oral Daily Greg Zuniga MD       • HYDROcodone-acetaminophen (NORCO) 5-325 MG per tablet 1 tablet  1 tablet Oral Q4H PRN Greg Zuniga MD       • ondansetron (ZOFRAN) injection 4 mg  4 mg Intravenous Q6H PRN Greg Zuniga MD       • potassium chloride (KLOR-CON) packet 40 mEq  40 mEq Oral Once Greg Zuniga MD       • sodium chloride 0.9 % flush 10 mL  10 mL Intravenous PRN James Pathak PA-C       • sodium chloride 0.9 % flush 3 mL  3 mL Intravenous Q12H Greg Zuniga MD       • sodium chloride 0.9 % flush 3-10 mL  3-10 mL Intravenous PRN Greg Zuniga MD       • sodium chloride 0.9 % infusion  50 mL/hr Intravenous Continuous Greg Zuniga MD 50 mL/hr at 08/09/19 1641 50 mL/hr at 08/09/19 1641        HOME MEDICATIONS:   No current facility-administered medications on file prior to encounter.      Current Outpatient Medications on File Prior to Encounter   Medication Sig Dispense Refill   • alendronate (FOSAMAX) 70 MG tablet alendronate 70 mg tablet   TAKE 1 TABLET EVERY WEEK     • ibuprofen (ADVIL,MOTRIN) 200 MG tablet Take 200 mg by mouth Every 6 (Six) Hours As Needed for Mild Pain .     • traMADol (ULTRAM) 50 MG tablet Take 1 tablet by mouth Every 8 (Eight) Hours As Needed for Moderate Pain  for up to 10 days. 30 tablet 0       FAMILY HISTORY:    Family History   Problem Relation Age of Onset   • Heart disease Other    • Hypertension Other    • Heart disease Mother    • Heart disease Father    • Mental illness Sister    • Heart disease Son        REVIEW OF SYSTEMS:      CONSTITUTIONAL:  Complains of fatigue.  Complains of loss of appetite.  Complains of recent weight loss.  Denies any fever, chills .     EYES: No visual disturbances. No discharge. No new lesions    ENMT:  No epistaxis, mouth sores or difficulty  "swallowing.    RESPIRATORY:  No new shortness of breath. No new cough or hemoptysis.    CARDIOVASCULAR:  No chest pain or palpitations.    GASTROINTESTINAL: Complains of chronic diarrhea.  No abdominal pain nausea, vomiting or blood in the stool.    GENITOURINARY: No Dysuria or Hematuria.    MUSCULOSKELETAL:Complains of recent worsening of chronic back pain and hip pain over last 3 months.    LYMPHATICS:  Denies any abnormal swollen glands anywhere in the body.    NEUROLOGICAL : No tingling or numbness. No headache or dizziness. No seizures or balance problems.    SKIN: No new skin lesions.    ENDOCRINE : No new heat or cold intolerance. No new polyuria . No polydipsia.    BREAST: Denies of feeling of any lump in the breast or any nipple discharge or any skin changes.        PHYSICAL EXAMINATION:      VITAL SIGNS:  /66 (BP Location: Right arm, Patient Position: Lying)   Pulse 76   Temp 97.4 °F (36.3 °C) (Temporal)   Resp 18   Ht 165.1 cm (65\")   Wt 54.6 kg (120 lb 6.4 oz)   SpO2 97%   BMI 20.04 kg/m²       08/09/19  1003 08/09/19  1454   Weight: 57.2 kg (126 lb) 54.6 kg (120 lb 6.4 oz)       ECOG performance status: 3    CONSTITUTIONAL:  Not in any distress.  Appears frail.    EYES: Mild conjunctival Pallor.  Icterus present. No Pterygium. Extraocular Movements intact.No ptosis.    ENMT:  Normocephalic, Atraumatic.No Facial Asymmetry noted.    NECK:  No adenopathy.Trachea midline. NO JVD.    RESPIRATORY:  Fair air entry bilateral. No rhonchi or wheezing.Fair respiratory effort.    CARDIOVASCULAR:  S1, S2. Regular rate and rhythm. No murmur or gallop appreciated.    ABDOMEN:  Soft, nontender. Bowel sounds present in all four quadrants.  No Hepatosplenomegaly appreciated.    MUSCULOSKELETAL:  No edema.No Calf Tenderness.Decreased range of motion.    NEUROLOGIC:    No  Motor deficit. Cranial Nerves 2-12 grossly intact.    SKIN : No new skin lesion identified. Skin is warm and dry to touch.    LYMPHATICS: " No new enlarged lymph nodes in neck or supraclavicular area.    PSYCHIATRY: Alert, awake and oriented ×3.          DIAGNOSTIC DATA:    WBC   Date Value Ref Range Status   08/09/2019 15.38 (H) 3.40 - 10.80 10*3/mm3 Final     RBC   Date Value Ref Range Status   08/09/2019 3.59 (L) 3.77 - 5.28 10*6/mm3 Final     Hemoglobin   Date Value Ref Range Status   08/09/2019 10.6 (L) 12.0 - 15.9 g/dL Final     Hematocrit   Date Value Ref Range Status   08/09/2019 31.8 (L) 34.0 - 46.6 % Final     MCV   Date Value Ref Range Status   08/09/2019 88.6 79.0 - 97.0 fL Final     MCH   Date Value Ref Range Status   08/09/2019 29.5 26.6 - 33.0 pg Final     MCHC   Date Value Ref Range Status   08/09/2019 33.3 31.5 - 35.7 g/dL Final     RDW   Date Value Ref Range Status   08/09/2019 19.1 (H) 12.3 - 15.4 % Final     RDW-SD   Date Value Ref Range Status   08/09/2019 58.8 (H) 37.0 - 54.0 fl Final     MPV   Date Value Ref Range Status   08/09/2019 12.3 (H) 6.0 - 12.0 fL Final     Platelets   Date Value Ref Range Status   08/09/2019 153 140 - 450 10*3/mm3 Final     Neutrophil %   Date Value Ref Range Status   08/09/2019 86.7 (H) 42.7 - 76.0 % Final     Lymphocyte %   Date Value Ref Range Status   08/09/2019 5.6 (L) 19.6 - 45.3 % Final     Monocyte %   Date Value Ref Range Status   08/09/2019 4.4 (L) 5.0 - 12.0 % Final     Eosinophil %   Date Value Ref Range Status   08/09/2019 0.0 (L) 0.3 - 6.2 % Final     Basophil %   Date Value Ref Range Status   08/09/2019 0.2 0.0 - 1.5 % Final     Immature Grans %   Date Value Ref Range Status   08/09/2019 3.1 (H) 0.0 - 0.5 % Final     Neutrophils, Absolute   Date Value Ref Range Status   08/09/2019 13.33 (H) 1.70 - 7.00 10*3/mm3 Final     Lymphocytes, Absolute   Date Value Ref Range Status   08/09/2019 0.86 0.70 - 3.10 10*3/mm3 Final     Monocytes, Absolute   Date Value Ref Range Status   08/09/2019 0.68 0.10 - 0.90 10*3/mm3 Final     Eosinophils, Absolute   Date Value Ref Range Status   08/09/2019 0.00  0.00 - 0.40 10*3/mm3 Final     Basophils, Absolute   Date Value Ref Range Status   08/09/2019 0.03 0.00 - 0.20 10*3/mm3 Final     Immature Grans, Absolute   Date Value Ref Range Status   08/09/2019 0.48 (H) 0.00 - 0.05 10*3/mm3 Final     nRBC   Date Value Ref Range Status   08/09/2019 0.5 (H) 0.0 - 0.2 /100 WBC Final     Glucose   Date Value Ref Range Status   08/09/2019 119 (H) 65 - 99 mg/dL Final     Sodium   Date Value Ref Range Status   08/09/2019 142 136 - 145 mmol/L Final     Potassium   Date Value Ref Range Status   08/09/2019 3.3 (L) 3.5 - 5.2 mmol/L Final     CO2   Date Value Ref Range Status   08/09/2019 22.0 22.0 - 29.0 mmol/L Final     Chloride   Date Value Ref Range Status   08/09/2019 100 98 - 107 mmol/L Final     Anion Gap   Date Value Ref Range Status   08/09/2019 20.0 (H) 5.0 - 15.0 mmol/L Final     Creatinine   Date Value Ref Range Status   08/09/2019 1.47 (H) 0.57 - 1.00 mg/dL Final     BUN   Date Value Ref Range Status   08/09/2019 31 (H) 8 - 23 mg/dL Final     BUN/Creatinine Ratio   Date Value Ref Range Status   08/09/2019 21.1 7.0 - 25.0 Final     Calcium   Date Value Ref Range Status   08/09/2019 8.7 8.6 - 10.5 mg/dL Final     eGFR Non  Amer   Date Value Ref Range Status   08/09/2019 34 (L) >60 mL/min/1.73 Final     Alkaline Phosphatase   Date Value Ref Range Status   08/09/2019 440 (H) 39 - 117 U/L Final     Total Protein   Date Value Ref Range Status   08/09/2019 5.9 (L) 6.0 - 8.5 g/dL Final     ALT (SGPT)   Date Value Ref Range Status   08/09/2019 143 (H) 1 - 33 U/L Final     AST (SGOT)   Date Value Ref Range Status   08/09/2019 199 (H) 1 - 32 U/L Final     Total Bilirubin   Date Value Ref Range Status   08/09/2019 4.8 (H) 0.2 - 1.2 mg/dL Final     Albumin   Date Value Ref Range Status   08/09/2019 3.30 (L) 3.50 - 5.20 g/dL Final     Globulin   Date Value Ref Range Status   08/09/2019 2.6 gm/dL Final     No results found for: IRON, TIBC, LABIRON, FERRITIN, [id number], FOLATE  No  results found for: , LABCA2, AFPTM, HCGQUANT, , CHROMGRNA, [id number], CEA, REFLABREPO]        Radiology Data :  MRI of  lumbar spine without contrast done on August 9, 2019 showed:  IMPRESSION:  1.  Diffuse lumbar spine, sacrum, and iliac wing multiple bony  marrow infiltrative lesions of varying sizes which are low signal  on T1 weighting and higher signal on fat-suppressed T2 weighting.  These lesions are highly suspicious for metastatic disease and/or  multiple myeloma. Recommend clinical correlation.  2.  Acute T11 mild compression fracture with loss of diffuse  vertebral body height by 15%. Associated oblique nondisplaced  fracture line component involving the posterior T11 vertebral  body.  3.  Old L1 anterior vertebral body wedge compression fracture  with loss of anterior vertebral body height by 80%.  4.  T12-L1: Mild anterior broad-based disc protrusion. Spinal  cord and nerve roots exit this to space level without compromise.      CT of abdomen and pelvis without contrast done on August 9, 2019 was reviewed, discussed with patient, it showed:  FINDINGS:      LOWER CHEST: There is a 1.4 cm nodule in the lingular segment of  the left upper lobe seen on axial image seven. There is a tiny  right pleural effusion.     HEPATOBILIARY: The liver has a nodular appearance. There are  faintly visualized hypodense lesions in the right lobe of the  liver with one of the largest measuring 2.4 cm seen on axial  image 45. The largest in the left lobe is seen on axial image 31  measuring 2.8 cm. Compared to the prior exam, the liver appears  much larger and the contour much more nodular, probably due to  the presence of metastatic disease throughout the liver not well  appreciated on this noncontrast study.  SPLEEN: Unremarkable.  PANCREAS: Unremarkable  ADRENAL GLANDS: Enlarged left adrenal gland with a nodule  measuring 1.5 cm suspicious for metastatic disease.  KIDNEYS/URETERS: No evidence of hydronephrosis  or suspicious  mass.     GASTROINTESTINAL: There are scattered colonic diverticula.  REPRODUCTIVE ORGANS: The uterus has been resected.  URINARY BLADDER: Unremarkable     VASCULAR: Unremarkable  LYMPH NODES: No pathologically enlarged nodes by size criteria.  PERITONEUM/RETROPERITONEUM: There is a small amount of ascites in  the abdomen and pelvis.      OSSEOUS STRUCTURES: Degenerative changes of both hips. The bones  appear demineralized. There is an old compression fracture of the  L1 vertebral body. There is an acute appearing nondisplaced  superior endplate fracture of the T11 vertebral body. There is a  cortical buckle of the anterior aspect of the S2 segment  suspicious for fracture. Lesions in the lumbar spine seen on the  recent MRI are not well appreciated on this CT.        IMPRESSION:  CONCLUSION:   1.  Enlarged nodular appearance of the liver with a few faintly  visualized hypodense nodules described above. The enlarged  nodular appearance is likely due to the presence of diffuse  metastatic disease in the liver not appreciated on this  noncontrast exam.  2.  1.4 cm nodule in the lingular segment of the left upper lobe.  3.  1.5 cm left adrenal nodule suspicious for metastatic disease.  4.  Old compression fracture of the L1 vertebral body.  5.  Acute appearing appearing nondisplaced superior endplate  fracture of the T11 vertebral body.   6.  Cortical buckle of the anterior aspect of the S2 segment  suspicious for fracture.   7.  Lesions in the lumbar spine seen on the recent MRI are not  well appreciated on this CT.              ASSESSMENT AND PLAN:      1.  Metastatic cancer involving bone, liver, adrenal and lung:  - Result of MRI of lumbar spine as well as CT of abdomen and pelvis without contrast were discussed with the patient and her daughter.  - Possibility of being metastatic cancer of unknown primary at this point were also discussed with patient and her daughter.  -Option of biopsy of liver  lesion versus comfort care/hospice were discussed with patient and her daughter.  - Patient states she is not interested in doing any chemotherapy.  But wants to think about her options before making final decision.  - We will follow-up tomorrow regarding patient's decision.    2.  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  - Hemoglobin is 10.8.  Will monitor with CBC for now    3.  Leukocytosis: Reactive.  Will monitor with CBC    4.  Elevated liver function test:  - Due to metastatic disease most likely.  Monitor with CMP    5.  Elevated creatinine:  -Continue with IV hydration    6.  Back pain due to metastases:  -Continue with PRN [place] for now.    7.  CODE STATUS:  - Patient wishes to be no CPR or intubation        Thank you for this consultation.          Abelino Leos MD  8/9/2019  5:49 PM          EMR Dragon/Transcription disclaimer:   Much of this encounter note is an electronic transcription/translation of spoken language to printed text. The electronic translation of spoken language may permit erroneous, or at times, nonsensical words or phrases to be inadvertently transcribed; Although I have reviewed the note for such errors, some may still exist.

## 2019-08-09 NOTE — ED PROVIDER NOTES
Subjective   Patient presents to emergency department for chronic low pain and decreased activities of daily living.  She was seen for this by Dr Bray on 8/2/2019.  She has a chronic L1 compression fracture and an MRI of her lumbosacral spine scheduled today.  Son and daughter state she is not able to walk on her own and she has had a steady decline since a fall that occurred last year which resulted in a right ankle fracture and L1 Compression fracture.  Son daughter states she lives alone and they are having difficulty taking care of her and falls are becoming more frrequent.  Endorses gait problem, unsteadiness, dizziness.  Denies chest pain, abdominal pain.  Endorses a ground level fall earlier today but denies any worsening of chronic pain.          History provided by:  Patient   used: No    Back Pain   Location:  Lumbar spine  Quality:  Stabbing and aching  Radiates to:  Does not radiate  Pain severity:  Moderate  Onset quality:  Gradual  Progression:  Worsening  Chronicity:  Chronic  Context: falling    Worsened by:  Ambulation and standing  Associated symptoms: no abdominal pain, no abdominal swelling, no bladder incontinence, no bowel incontinence, no chest pain, no dysuria, no fever, no headaches, no leg pain, no numbness, no paresthesias, no pelvic pain, no perianal numbness and no weakness    Risk factors: hx of osteoporosis        Review of Systems   Constitutional: Positive for fatigue. Negative for chills and fever.   HENT: Negative for sore throat and trouble swallowing.    Eyes: Negative for visual disturbance.   Respiratory: Negative for shortness of breath and wheezing.    Cardiovascular: Negative for chest pain.   Gastrointestinal: Negative for abdominal pain and bowel incontinence.   Genitourinary: Negative for bladder incontinence, decreased urine volume, dysuria and pelvic pain.   Musculoskeletal: Positive for back pain and gait problem.   Skin: Negative for color  "change.   Allergic/Immunologic: Negative for immunocompromised state.   Neurological: Positive for dizziness (chronic with position changes). Negative for syncope, speech difficulty, weakness, numbness, headaches and paresthesias.   Hematological: Does not bruise/bleed easily.   Psychiatric/Behavioral: Negative for confusion.       Past Medical History:   Diagnosis Date   • Osteopetrosis        Allergies   Allergen Reactions   • Contrast Dye      Pt states \" I had red dye here and broke out in rash.\"   • Iodine Rash       Past Surgical History:   Procedure Laterality Date   • ANKLE OPEN REDUCTION INTERNAL FIXATION Right 12/7/2017    Procedure: OPEN REDUCTION INTERNAL FIXATION RIGHT ANKLE       (C-ARM#3);  Surgeon: Marco A Ibrahim MD;  Location: [facility];  Service:    • HYSTERECTOMY         Family History   Problem Relation Age of Onset   • Heart disease Other    • Hypertension Other        Social History     Socioeconomic History   • Marital status:      Spouse name: Not on file   • Number of children: Not on file   • Years of education: Not on file   • Highest education level: Not on file   Tobacco Use   • Smoking status: Never Smoker   • Smokeless tobacco: Never Used   Substance and Sexual Activity   • Alcohol use: No   • Drug use: No   • Sexual activity: Defer           Objective      BP 97/53 (BP Location: Left arm, Patient Position: Lying)   Pulse 72   Temp 97.9 °F (36.6 °C) (Oral)   Resp 18   Ht 165.1 cm (65\")   Wt 57.2 kg (126 lb)   SpO2 98%   BMI 20.97 kg/m²     Physical Exam   Constitutional: She is oriented to person, place, and time. She appears well-developed and well-nourished. No distress.   Cachectic     HENT:   Head: Normocephalic and atraumatic.   Eyes: Conjunctivae are normal.   Cardiovascular: Normal rate, regular rhythm, normal heart sounds and intact distal pulses.   Pulmonary/Chest: Effort normal and breath sounds normal. No respiratory distress. She has no wheezes. "   Musculoskeletal: She exhibits tenderness. She exhibits no edema or deformity.        Lumbar back: She exhibits tenderness. She exhibits no deformity.        Back:    Neurological: She is alert and oriented to person, place, and time.   Skin: Skin is warm. Capillary refill takes less than 2 seconds.   Psychiatric: She has a normal mood and affect. Her behavior is normal. Thought content normal.   Nursing note and vitals reviewed.      Procedures           ED Course  ED Course as of Aug 09 1333   Fri Aug 09, 2019   1303 Discussed with Dr Bray.  States there is nothing emergent to do at this point.    [MARKY]      ED Course User Index  [MAKRY] James Pathak PA-C      Results for orders placed or performed during the hospital encounter of 08/09/19   Comprehensive Metabolic Panel   Result Value Ref Range    Glucose 119 (H) 65 - 99 mg/dL    BUN 31 (H) 8 - 23 mg/dL    Creatinine 1.47 (H) 0.57 - 1.00 mg/dL    Sodium 142 136 - 145 mmol/L    Potassium 3.3 (L) 3.5 - 5.2 mmol/L    Chloride 100 98 - 107 mmol/L    CO2 22.0 22.0 - 29.0 mmol/L    Calcium 8.7 8.6 - 10.5 mg/dL    Total Protein 5.9 (L) 6.0 - 8.5 g/dL    Albumin 3.30 (L) 3.50 - 5.20 g/dL    ALT (SGPT) 143 (H) 1 - 33 U/L    AST (SGOT) 199 (H) 1 - 32 U/L    Alkaline Phosphatase 440 (H) 39 - 117 U/L    Total Bilirubin 4.8 (H) 0.2 - 1.2 mg/dL    eGFR Non African Amer 34 (L) >60 mL/min/1.73    Globulin 2.6 gm/dL    A/G Ratio 1.3 g/dL    BUN/Creatinine Ratio 21.1 7.0 - 25.0    Anion Gap 20.0 (H) 5.0 - 15.0 mmol/L   CBC Auto Differential   Result Value Ref Range    WBC 15.38 (H) 3.40 - 10.80 10*3/mm3    RBC 3.59 (L) 3.77 - 5.28 10*6/mm3    Hemoglobin 10.6 (L) 12.0 - 15.9 g/dL    Hematocrit 31.8 (L) 34.0 - 46.6 %    MCV 88.6 79.0 - 97.0 fL    MCH 29.5 26.6 - 33.0 pg    MCHC 33.3 31.5 - 35.7 g/dL    RDW 19.1 (H) 12.3 - 15.4 %    RDW-SD 58.8 (H) 37.0 - 54.0 fl    MPV 12.3 (H) 6.0 - 12.0 fL    Platelets 153 140 - 450 10*3/mm3    Neutrophil % 86.7 (H) 42.7 - 76.0 %     Lymphocyte % 5.6 (L) 19.6 - 45.3 %    Monocyte % 4.4 (L) 5.0 - 12.0 %    Eosinophil % 0.0 (L) 0.3 - 6.2 %    Basophil % 0.2 0.0 - 1.5 %    Immature Grans % 3.1 (H) 0.0 - 0.5 %    Neutrophils, Absolute 13.33 (H) 1.70 - 7.00 10*3/mm3    Lymphocytes, Absolute 0.86 0.70 - 3.10 10*3/mm3    Monocytes, Absolute 0.68 0.10 - 0.90 10*3/mm3    Eosinophils, Absolute 0.00 0.00 - 0.40 10*3/mm3    Basophils, Absolute 0.03 0.00 - 0.20 10*3/mm3    Immature Grans, Absolute 0.48 (H) 0.00 - 0.05 10*3/mm3    nRBC 0.5 (H) 0.0 - 0.2 /100 WBC   Light Blue Top   Result Value Ref Range    Extra Tube hold for add-on    Gold Top - SST   Result Value Ref Range    Extra Tube Hold for add-ons.      Xr Spine Lumbar 2 Or 3 View    Result Date: 8/3/2019  Narrative: 2 views lumbar spine Comparison none Patient has relative osteopenia there is an L1 compression fracture which appears old it is likely a 75 to 80% compression anteriorly.  There is kyphotic deformity here.  Arthritic and spondylotic changes are noted more caudally in the lumbar spine.  No obvious acute fracture is seen here.  There is calcification noted in her anterior vessels consistent with peripheral vascular disease.  In reviewing a CT scan from 2018 there was evidence of compressive fracture at L1. Impression: Compressive deformity of L1 of unknown chronicity.  Was present incidental finding on CT scan in 2018.  Peripheral vascular disease.  Recommend possible MRI scan for further work-up chronicity of fracture being on clinical situation    Mri Lumbar Spine Without Contrast    Result Date: 8/9/2019  Narrative: Procedure: MRI lumbar spine without contrast Reason for exam: Low back pain following fall FINDINGS: Multisequence multiplanar MR imaging of the lumbar spine was performed without contrast. Old L1 anterior vertebral body wedge compression fracture with loss of anterior vertebral body height by 80%. Acute T11 mild compression fracture with mild loss of diffuse vertebral body  height by 15% and associated posterior vertebral body oblique linear focus of low signal on all weight sequences with surrounding lower signal on T1 weighting which is higher signal on T2 weighting. No other evidence of acute fracture or dislocation. Diffuse lumbar spine, sacrum, and iliac wing multiple bony marrow infiltrative lesions of varying sizes which are low signal on T1 weighting and higher signal on fat-suppressed T2 weighting. The conus of the spinal cord appears normal with tip at the L1 vertebral body level. T11-T12: Disc space normal. No disc protrusion or extrusion. Spinal cord and nerve roots exit without compromise. T12-L1: Mild anterior broad-based disc protrusion. Spinal cord and nerve roots exit this to space level without compromise. L1-L2: Disc space normal. No disc protrusion or extrusion. Nerve roots exit without compromise. L2-L3: Disc space normal. No disc protrusion or extrusion. Nerve roots exit without compromise. L3-L4: Disc space normal. No disc protrusion or extrusion. Nerve roots exit without compromise. L4-L5: Disc space normal. No disc protrusion or extrusion. Nerve roots exit without compromise. L5-S1: Disc space normal. No disc protrusion or extrusion. Nerve roots exit without compromise.     Impression: 1.  Diffuse lumbar spine, sacrum, and iliac wing multiple bony marrow infiltrative lesions of varying sizes which are low signal on T1 weighting and higher signal on fat-suppressed T2 weighting. These lesions are highly suspicious for metastatic disease and/or multiple myeloma. Recommend clinical correlation. 2.  Acute T11 mild compression fracture with loss of diffuse vertebral body height by 15%. Associated oblique nondisplaced fracture line component involving the posterior T11 vertebral body. 3.  Old L1 anterior vertebral body wedge compression fracture with loss of anterior vertebral body height by 80%. 4.  T12-L1: Mild anterior broad-based disc protrusion. Spinal cord and  nerve roots exit this to space level without compromise. 5.  Findings discussed with ER physician by phone at 11:38 AM on 8/9/2019. Electronically signed by:  Brian Nagy MD  8/9/2019 11:40 AM CDT Workstation: [id number]      Patient admitted to hospitalist.          MDM      Final diagnoses:   Malignant neoplasm of lumbar vertebra (CMS/HCC)   Elevated liver enzymes   Closed wedge compression fracture of eleventh thoracic vertebra, initial encounter (CMS/HCC)            James Pathak PA-C  08/09/19 [id number]

## 2019-08-10 NOTE — DISCHARGE PLACEMENT REQUEST
"Rebecca Funk (84 y.o. Female)     Date of Birth Social Security Number Address Home Phone MRN    1935  [address]  [address]   [place] [address] [phone] [id number]    Religious Marital Status          [employer]        Admission Date Admission Type Admitting Provider Attending Provider Department, Room/Bed    8/9/19 Emergency Greg Zuniga MD Gibson, Bryce, MD [address], 416/1    Discharge Date Discharge Disposition Discharge Destination                       Attending Provider:  Greg Zuniga MD    Allergies:  Contrast Dye, Iodine    Isolation:  None   Infection:  None   Code Status:  No CPR    Ht:  165.1 cm (65\")   Wt:  55.6 kg (122 lb 9.6 oz)    Admission Cmt:  None   Principal Problem:  None                Active Insurance as of 8/9/2019     Primary Coverage     Payor Plan Insurance Group Employer/Plan Group    [facility] MEDICARE REPLACEMENT [facility] [id number]     Payor Plan Address Payor Plan Phone Number Payor Plan Fax Number Effective Dates    [address] [address]   1/1/2017 - None Entered    [facility] [id number]       Subscriber Name Subscriber Birth Date Member ID       REBECCA FUNK 1935 [id number]           Secondary Coverage     Payor Plan Insurance Group Employer/Plan Group    [employer] [id number]     Payor Plan Address Payor Plan Phone Number Payor Plan Fax Number Effective Dates    [address] [phone]  6/29/1990 - None Entered    [place] [address]       Subscriber Name Subscriber Birth Date Member ID       REBECCA FUNK 1935 [id number]                 Emergency Contacts      (Rel.) Home Phone Work Phone Mobile Phone    Karlos Funk (Son) [phone] -- [phone]    Prudence Kang [phone] -- [phone]            Emergency Contact Information     Name Relation Home Work Mobile    Karlos Funk Son [phone]    Prudence Kang  [phone]  " [phone]          Insurance Information                [facility] MEDICARE REPLACEMENT/[facility] Phone:     Subscriber: Rebecca Zaragoza Subscriber#: [id number]    Group#: [id number] Precert#:         / Phone: [phone]    Subscriber: Rebecca Zaragoza Subscriber#: [id number]    Group#: [id number] Precert#:              History & Physical      Greg Zuniga MD at 2019  2:49 PM              HISTORY AND PHYSICAL  NAME: Rebecca Zaragoza  : 1935  MRN: [id number]    DATE OF ADMISSION: 19    DATE & TIME SEEN: 19 2:49 PM    PCP: Juanis Ivy APRN    CODE STATUS:   Code Status and Medical Interventions:   Ordered at: 19 1524     Limited Support to NOT Include:    Intubation    Artificial Nutrition     Code Status:    No CPR     Medical Interventions (Level of Support Prior to Arrest):    Limited       CHIEF COMPLAINT fall    HPI:  Rebecca Zaragoza is a 84 y.o. female presents with recent fall on back pain.  Patient also endorses weakness and difficulty ambulating secondary to low back pain.  Patient denies any fever, chills, nausea, vomiting, chest pain, diaphoresis, or dyspnea.  Patient does endorse some acute on chronic low back pain as well as weakness.    In the emergency department patient was taken to the MRI scanner for L-spine evaluation.  Patient was found to have diffuse multiple bony marrow infiltrative lesions of various size which was suggestive for multiple myeloma or metastatic disease.  Patient also with an acute T11 mild compression fracture.  Patient also had a CT pending at time of admission.    CONCURRENT MEDICAL HISTORY:  Past Medical History:   Diagnosis Date   • Osteopetrosis        PAST SURGICAL HISTORY:  Past Surgical History:   Procedure Laterality Date   • ANKLE OPEN REDUCTION INTERNAL FIXATION Right 2017    Procedure: OPEN REDUCTION INTERNAL FIXATION RIGHT ANKLE       (C-ARM#3);  Surgeon: Marco A Ibrahim,  MD;  Location: [facility] OR;  Service:    • HYSTERECTOMY         FAMILY HISTORY:  Family History   Problem Relation Age of Onset   • Heart disease Other    • Hypertension Other         SOCIAL HISTORY:  Social History     Socioeconomic History   • Marital status:      Spouse name: Not on file   • Number of children: Not on file   • Years of education: Not on file   • Highest education level: Not on file   Tobacco Use   • Smoking status: Never Smoker   • Smokeless tobacco: Never Used   Substance and Sexual Activity   • Alcohol use: No   • Drug use: No   • Sexual activity: Defer       HOME MEDICATIONS:  Prior to Admission medications    Medication Sig Start Date End Date Taking? Authorizing Provider   alendronate (FOSAMAX) 70 MG tablet alendronate 70 mg tablet   TAKE 1 TABLET EVERY WEEK    ProviderSpencer MD   ibuprofen (ADVIL,MOTRIN) 200 MG tablet Take 200 mg by mouth Every 6 (Six) Hours As Needed for Mild Pain .    ProviderSpencer MD   traMADol (ULTRAM) 50 MG tablet Take 1 tablet by mouth Every 8 (Eight) Hours As Needed for Moderate Pain  for up to 10 days. 8/2/19 8/12/19  Jose Enrique Bray MD       ALLERGIES:  Contrast dye and Iodine    REVIEW OF SYSTEMS  Review of Systems   Constitutional: Positive for activity change and fatigue. Negative for appetite change and fever.   Respiratory: Negative for cough and shortness of breath.    Cardiovascular: Negative for chest pain and palpitations.   Gastrointestinal: Negative for abdominal pain and nausea.   Genitourinary: Negative for difficulty urinating and dysuria.   Musculoskeletal: Positive for arthralgias, back pain and gait problem.   Skin: Negative for color change and rash.   Neurological: Negative for dizziness, weakness and headaches.   Psychiatric/Behavioral: Negative for agitation, confusion and sleep disturbance.       PHYSICAL EXAM:  Temp:  [97.9 °F (36.6 °C)] 97.9 °F (36.6 °C)  Heart Rate:  [72-87] 72  Resp:  [18] 18  BP: (96-97)/(53-60)  97/53  Body mass index is 20.97 kg/m².     Physical Exam   Constitutional: She is oriented to person, place, and time. She appears well-developed and well-nourished. No distress.   HENT:   Head: Normocephalic and atraumatic.   Right Ear: External ear normal.   Left Ear: External ear normal.   Nose: Nose normal.   Eyes: Conjunctivae and EOM are normal. Pupils are equal, round, and reactive to light.   Neck: Neck supple. No thyromegaly present.   Cardiovascular: Normal rate, regular rhythm and normal heart sounds.   Pulmonary/Chest: Effort normal and breath sounds normal. No respiratory distress. She has no wheezes. She has no rales. She exhibits no tenderness.   Abdominal: Soft. Bowel sounds are normal. She exhibits no distension and no mass. There is no tenderness. There is no rebound and no guarding.   Musculoskeletal: She exhibits no edema.   Patient can move all 4 extremities.  Patient does have some reduced movement in her bilateral lower extremities that appears symmetrical.  This movement appears to be limited secondary to pain.  Patient is neurovascularly intact distally in her lower extremities.   Neurological: She is alert and oriented to person, place, and time.   Skin: Skin is warm and dry. No rash noted. She is not diaphoretic. No erythema. No pallor.   Psychiatric: She has a normal mood and affect. Her behavior is normal.   Nursing note and vitals reviewed.      DIAGNOSTIC DATA:   Lab Results (last 24 hours)     Procedure Component Value Units Date/Time    Extra Tubes [[id number]] Collected:  08/09/19 1117    Specimen:  Blood, Venous Line Updated:  08/09/19 1230    Narrative:       The following orders were created for panel order Extra Tubes.  Procedure                               Abnormality         Status                     ---------                               -----------         ------                     Light Blue Top[[id number]]                                   Final result                [facility][[id number]]                                   Final result                 Please view results for these tests on the individual orders.    Light Blue Top [[id number]] Collected:  08/09/19 1117    Specimen:  Blood Updated:  08/09/19 1230     Extra Tube hold for add-on     Comment: Auto resulted       Gold Top - SST [[id number]] Collected:  08/09/19 1117    Specimen:  Blood Updated:  08/09/19 1230     Extra Tube Hold for add-ons.     Comment: Auto resulted.       Comprehensive Metabolic Panel [[id number]]  (Abnormal) Collected:  08/09/19 1114    Specimen:  Blood Updated:  08/09/19 1144     Glucose 119 mg/dL      BUN 31 mg/dL      Creatinine 1.47 mg/dL      Sodium 142 mmol/L      Potassium 3.3 mmol/L      Chloride 100 mmol/L      CO2 22.0 mmol/L      Calcium 8.7 mg/dL      Total Protein 5.9 g/dL      Albumin 3.30 g/dL      ALT (SGPT) 143 U/L      AST (SGOT) 199 U/L      Alkaline Phosphatase 440 U/L      Total Bilirubin 4.8 mg/dL      eGFR Non African Amer 34 mL/min/1.73      Globulin 2.6 gm/dL      A/G Ratio 1.3 g/dL      BUN/Creatinine Ratio 21.1     Anion Gap 20.0 mmol/L     Narrative:       GFR Normal >60  Chronic Kidney Disease <60  Kidney Failure <15    CBC & Differential [[id number]] Collected:  08/09/19 1114    Specimen:  Blood Updated:  08/09/19 1120    Narrative:       The following orders were created for panel order CBC & Differential.  Procedure                               Abnormality         Status                     ---------                               -----------         ------                     CBC Auto Differential[[id number]]        Abnormal            Final result                 Please view results for these tests on the individual orders.    CBC Auto Differential [[id number]]  (Abnormal) Collected:  08/09/19 1114    Specimen:  Blood Updated:  08/09/19 1120     WBC 15.38 10*3/mm3      RBC 3.59 10*6/mm3      Hemoglobin 10.6 g/dL      Hematocrit 31.8 %      MCV 88.6 fL      MCH 29.5  pg      MCHC 33.3 g/dL      RDW 19.1 %      RDW-SD 58.8 fl      MPV 12.3 fL      Platelets 153 10*3/mm3      Neutrophil % 86.7 %      Lymphocyte % 5.6 %      Monocyte % 4.4 %      Eosinophil % 0.0 %      Basophil % 0.2 %      Immature Grans % 3.1 %      Neutrophils, Absolute 13.33 10*3/mm3      Lymphocytes, Absolute 0.86 10*3/mm3      Monocytes, Absolute 0.68 10*3/mm3      Eosinophils, Absolute 0.00 10*3/mm3      Basophils, Absolute 0.03 10*3/mm3      Immature Grans, Absolute 0.48 10*3/mm3      nRBC 0.5 /100 WBC         Estimated Creatinine Clearance: 26.6 mL/min (A) (by C-G formula based on SCr of 1.47 mg/dL (H)).     Imaging Results (last 24 hours)     Procedure Component Value Units Date/Time    MRI Lumbar Spine Without Contrast [[id number]] Collected:  08/09/19 1020     Updated:  08/09/19 1141    Narrative:       Procedure: MRI lumbar spine without contrast    Reason for exam: Low back pain following fall    FINDINGS: Multisequence multiplanar MR imaging of the lumbar  spine was performed without contrast. Old L1 anterior vertebral  body wedge compression fracture with loss of anterior vertebral  body height by 80%. Acute T11 mild compression fracture with mild  loss of diffuse vertebral body height by 15% and associated  posterior vertebral body oblique linear focus of low signal on  all weight sequences with surrounding lower signal on T1  weighting which is higher signal on T2 weighting. No other  evidence of acute fracture or dislocation. Diffuse lumbar spine,  sacrum, and iliac wing multiple bony marrow infiltrative lesions  of varying sizes which are low signal on T1 weighting and higher  signal on fat-suppressed T2 weighting. The conus of the spinal  cord appears normal with tip at the L1 vertebral body level.    T11-T12: Disc space normal. No disc protrusion or extrusion.  Spinal cord and nerve roots exit without compromise.    T12-L1: Mild anterior broad-based disc protrusion. Spinal cord  and nerve  roots exit this to space level without compromise.    L1-L2: Disc space normal. No disc protrusion or extrusion. Nerve  roots exit without compromise.    L2-L3: Disc space normal. No disc protrusion or extrusion. Nerve  roots exit without compromise.    L3-L4: Disc space normal. No disc protrusion or extrusion. Nerve  roots exit without compromise.    L4-L5: Disc space normal. No disc protrusion or extrusion. Nerve  roots exit without compromise.    L5-S1: Disc space normal. No disc protrusion or extrusion. Nerve  roots exit without compromise.      Impression:       1.  Diffuse lumbar spine, sacrum, and iliac wing multiple bony  marrow infiltrative lesions of varying sizes which are low signal  on T1 weighting and higher signal on fat-suppressed T2 weighting.  These lesions are highly suspicious for metastatic disease and/or  multiple myeloma. Recommend clinical correlation.  2.  Acute T11 mild compression fracture with loss of diffuse  vertebral body height by 15%. Associated oblique nondisplaced  fracture line component involving the posterior T11 vertebral  body.  3.  Old L1 anterior vertebral body wedge compression fracture  with loss of anterior vertebral body height by 80%.  4.  T12-L1: Mild anterior broad-based disc protrusion. Spinal  cord and nerve roots exit this to space level without compromise.  5.  Findings discussed with ER physician by phone at 11:38 AM on  8/9/2019.    Electronically signed by:  Brian Nagy MD  8/9/2019 11:40 AM CDT  Workstation: [id number]          I reviewed the patient's new clinical results.    ASSESSMENT AND PLAN: This is a 84 y.o. 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  Fall.  Patient with acute compression fracture as well as likely bone metastasis or multiple myeloma.  Have consulted hematology oncology.    #.  Weakness.  This appears to be acute on chronic.  This was exacerbated with  patient's recent fall and back pain.  #.  Back pain.  Acute on chronic.  Likely exacerbated by bony mets or multiple myeloma.  Hematology oncology consulted.  Have discussed the case with orthopedics.  No immediate surgical intervention needed.  Pain medication.  #.  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  Hematology oncology consulted.  Patient and family to discuss options.  #.  Elevated LFTs.  This is likely due to metastatic disease involving the liver.  Monitor.      Will monitor patient's hospital course and adjust treatment as hospital course dictates.    DVT prophylaxis: SCDs  Code status is   Code Status and Medical Interventions:   Ordered at: 08/09/19 1524     Limited Support to NOT Include:    Intubation    Artificial Nutrition     Code Status:    No CPR     Medical Interventions (Level of Support Prior to Arrest):    Limited        I discussed the patients findings and my recommendations with patient, family, nursing staff and consulting provider.           This document has been electronically signed by Greg Zuniga MD on August 9, 2019 2:49 PM     Electronically signed by Greg Zuniga MD at 8/9/2019  6:28 PM       Hospital Medications (active)       Dose Frequency Start End    diphenhydrAMINE (BENADRYL) 12.5 MG/5ML elixir 12.5 mg 12.5 mg Nightly PRN 8/9/2019     Sig - Route: Take 5 mL by mouth At Night As Needed for Itching or Sleep. - Oral    famotidine (PEPCID) tablet 20 mg 20 mg Daily 8/9/2019     Sig - Route: Take 1 tablet by mouth Daily. - Oral    morphine injection 1 mg 1 mg Every 6 Hours PRN 8/10/2019 8/20/2019    Sig - Route: Infuse 0.5 mL into a venous catheter Every 6 (Six) Hours As Needed for Severe Pain . - Intravenous    ondansetron (ZOFRAN) injection 4 mg 4 mg Every 6 Hours PRN 8/9/2019     Sig - Route: Infuse 2 mL into a venous catheter Every 6 (Six) Hours As Needed for Nausea or Vomiting. - Intravenous    potassium chloride (KLOR-CON) packet 40 mEq 40 mEq  "Once 8/9/2019 8/9/2019    Sig - Route: Take 40 mEq by mouth 1 (One) Time. - Oral    sodium chloride 0.9 % flush 10 mL 10 mL As Needed 8/9/2019     Sig - Route: Infuse 10 mL into a venous catheter As Needed for Line Care. - Intravenous    Cosign for Ordering: Accepted by Luis Enrique Sutton DO on 8/9/2019  9:53 AM    Linked Group 1:  \"And\" Linked Group Details        sodium chloride 0.9 % flush 3 mL 3 mL Every 12 Hours Scheduled 8/9/2019     Sig - Route: Infuse 3 mL into a venous catheter Every 12 (Twelve) Hours. - Intravenous    sodium chloride 0.9 % flush 3-10 mL 3-10 mL As Needed 8/9/2019     Sig - Route: Infuse 3-10 mL into a venous catheter As Needed for Line Care. - Intravenous    sodium chloride 0.9 % infusion 50 mL/hr Continuous 8/9/2019 8/10/2019    Sig - Route: Infuse 50 mL/hr into a venous catheter Continuous. - Intravenous    diphenhydrAMINE (BENADRYL) 12.5 MG/5ML elixir 12.5 mg (Discontinued) 12.5 mg Every 6 Hours PRN 8/9/2019 8/9/2019    Sig - Route: Take 5 mL by mouth Every 6 (Six) Hours As Needed for Itching. - Oral    HYDROcodone-acetaminophen (NORCO) 5-325 MG per tablet 1 tablet (Discontinued) 1 tablet Every 4 Hours PRN 8/9/2019 8/10/2019    Sig - Route: Take 1 tablet by mouth Every 4 (Four) Hours As Needed for Mild Pain , Moderate Pain  or Severe Pain . - Oral             Physician Progress Notes (last 24 hours) (Notes from 8/9/2019  2:13 PM through 8/10/2019  2:13 PM)      Abelino Leos MD at 8/10/2019 11:54 AM              HISTORY OF PRESENT ILLNESS:    Complains of generalized weakness.  Complains of back pain which is improved as compared to yesterday.  Denies any bleeding.  Denies any nausea or vomiting.      PAST MEDICAL HISTORY:    Past Medical History:   Diagnosis Date   • Osteopetrosis        SOCIAL HISTORY:    Social History     Tobacco Use   • Smoking status: Never Smoker   • Smokeless tobacco: Never Used   Substance Use Topics   • Alcohol use: No   • Drug use: No       Surgical History " ":  Past Surgical History:   Procedure Laterality Date   • ANKLE OPEN REDUCTION INTERNAL FIXATION Right 12/7/2017    Procedure: OPEN REDUCTION INTERNAL FIXATION RIGHT ANKLE       (C-ARM#3);  Surgeon: Marco A Ibrahim MD;  Location: [facility] OR;  Service:    • FRACTURE SURGERY     • HYSTERECTOMY         ALLERGIES:    Allergies   Allergen Reactions   • Contrast Dye      Pt states \" I had red dye here and broke out in rash.\"   • Iodine Rash         FAMILY HISTORY:  Family History   Problem Relation Age of Onset   • Heart disease Other    • Hypertension Other    • Heart disease Mother    • Heart disease Father    • Mental illness Sister    • Heart disease Son            REVIEW OF SYSTEMS:      CONSTITUTIONAL:  Complains of fatigue.  Complains of loss of appetite.  Complains of recent weight loss.  Denies any fever, chills .      EYES: No visual disturbances. No discharge. No new lesions     ENMT:  No epistaxis, mouth sores or difficulty swallowing.     RESPIRATORY:  No new shortness of breath. No new cough or hemoptysis.     CARDIOVASCULAR:  No chest pain or palpitations.     GASTROINTESTINAL: Complains of chronic diarrhea.  No abdominal pain nausea, vomiting or blood in the stool.     GENITOURINARY: No Dysuria or Hematuria.     MUSCULOSKELETAL:Complains of recent worsening of chronic back pain and hip pain over last 3 months.     LYMPHATICS:  Denies any abnormal swollen glands anywhere in the body.     NEUROLOGICAL : No tingling or numbness. No headache or dizziness. No seizures or balance problems.     SKIN: No new skin lesions.     ENDOCRINE : No new heat or cold intolerance. No new polyuria . No polydipsia.     BREAST: Denies of feeling of any lump in the breast or any nipple discharge or any skin changes.             PHYSICAL EXAMINATION:      VITAL SIGNS:  /60 (BP Location: Left arm, Patient Position: Lying)   Pulse 68   Temp 97.5 °F (36.4 °C) (Temporal)   Resp 18   Ht 165.1 cm (65\")   Wt 55.6 kg " (122 lb 9.6 oz)   SpO2 99%   BMI 20.40 kg/m²        08/09/19  1003 08/09/19  1454 08/10/19  0512   Weight: 57.2 kg (126 lb) 54.6 kg (120 lb 6.4 oz) 55.6 kg (122 lb 9.6 oz)         ECOG performance status: 3     CONSTITUTIONAL:  Not in any distress.  Appears frail.     EYES: Mild conjunctival Pallor.  Icterus present. No Pterygium. Extraocular Movements intact.No ptosis.     ENMT:  Normocephalic, Atraumatic.No Facial Asymmetry noted.     NECK:  No adenopathy.Trachea midline. NO JVD.     RESPIRATORY:  Fair air entry bilateral. No rhonchi or wheezing.Fair respiratory effort.     CARDIOVASCULAR:  S1, S2. Regular rate and rhythm. No murmur or gallop appreciated.     ABDOMEN:  Soft, nontender. Bowel sounds present in all four quadrants.  No Hepatosplenomegaly appreciated.     MUSCULOSKELETAL:  No edema.No Calf Tenderness.Decreased range of motion.     NEUROLOGIC:    No  Motor deficit. Cranial Nerves 2-12 grossly intact.     SKIN : Icterus present. Skin is warm and dry to touch.     LYMPHATICS: No new enlarged lymph nodes in neck or supraclavicular area.     PSYCHIATRY: Alert, awake and oriented ×3.             DIAGNOSTIC DATA:    Glucose   Date Value Ref Range Status   08/10/2019 144 (H) 65 - 99 mg/dL Final     Sodium   Date Value Ref Range Status   08/10/2019 142 136 - 145 mmol/L Final     Potassium   Date Value Ref Range Status   08/10/2019 3.7 3.5 - 5.2 mmol/L Final     CO2   Date Value Ref Range Status   08/10/2019 22.0 22.0 - 29.0 mmol/L Final     Chloride   Date Value Ref Range Status   08/10/2019 104 98 - 107 mmol/L Final     Anion Gap   Date Value Ref Range Status   08/10/2019 16.0 (H) 5.0 - 15.0 mmol/L Final     Creatinine   Date Value Ref Range Status   08/10/2019 1.47 (H) 0.57 - 1.00 mg/dL Final     BUN   Date Value Ref Range Status   08/10/2019 27 (H) 8 - 23 mg/dL Final     BUN/Creatinine Ratio   Date Value Ref Range Status   08/10/2019 18.4 7.0 - 25.0 Final     Calcium   Date Value Ref Range Status    08/10/2019 8.1 (L) 8.6 - 10.5 mg/dL Final     eGFR Non  Amer   Date Value Ref Range Status   08/10/2019 34 (L) >60 mL/min/1.73 Final     Alkaline Phosphatase   Date Value Ref Range Status   08/10/2019 472 (H) 39 - 117 U/L Final     Total Protein   Date Value Ref Range Status   08/10/2019 5.5 (L) 6.0 - 8.5 g/dL Final     ALT (SGPT)   Date Value Ref Range Status   08/10/2019 137 (H) 1 - 33 U/L Final     AST (SGOT)   Date Value Ref Range Status   08/10/2019 208 (H) 1 - 32 U/L Final     Total Bilirubin   Date Value Ref Range Status   08/10/2019 4.9 (H) 0.2 - 1.2 mg/dL Final     Albumin   Date Value Ref Range Status   08/10/2019 3.20 (L) 3.50 - 5.20 g/dL Final     Globulin   Date Value Ref Range Status   08/10/2019 2.3 gm/dL Final     Lab Results   Component Value Date    WBC 14.56 (H) 08/10/2019    HGB 10.2 (L) 08/10/2019    HCT 30.4 (L) 08/10/2019    MCV 89.4 08/10/2019     (L) 08/10/2019     Lab Results   Component Value Date    NEUTROABS 12.49 (H) 08/10/2019     No results found for: , LABCA2, AFPTM, HCGQUANT, , CHROMGRNA, [id number], CEA, REFLABREPO        Radiology Data :  MRI of  lumbar spine without contrast done on August 9, 2019 showed:  IMPRESSION:  1.  Diffuse lumbar spine, sacrum, and iliac wing multiple bony  marrow infiltrative lesions of varying sizes which are low signal  on T1 weighting and higher signal on fat-suppressed T2 weighting.  These lesions are highly suspicious for metastatic disease and/or  multiple myeloma. Recommend clinical correlation.  2.  Acute T11 mild compression fracture with loss of diffuse  vertebral body height by 15%. Associated oblique nondisplaced  fracture line component involving the posterior T11 vertebral  body.  3.  Old L1 anterior vertebral body wedge compression fracture  with loss of anterior vertebral body height by 80%.  4.  T12-L1: Mild anterior broad-based disc protrusion. Spinal  cord and nerve roots exit this to space level without  compromise.        CT of abdomen and pelvis without contrast done on August 9, 2019 was reviewed, discussed with patient, it showed:  FINDINGS:      LOWER CHEST: There is a 1.4 cm nodule in the lingular segment of  the left upper lobe seen on axial image seven. There is a tiny  right pleural effusion.     HEPATOBILIARY: The liver has a nodular appearance. There are  faintly visualized hypodense lesions in the right lobe of the  liver with one of the largest measuring 2.4 cm seen on axial  image 45. The largest in the left lobe is seen on axial image 31  measuring 2.8 cm. Compared to the prior exam, the liver appears  much larger and the contour much more nodular, probably due to  the presence of metastatic disease throughout the liver not well  appreciated on this noncontrast study.  SPLEEN: Unremarkable.  PANCREAS: Unremarkable  ADRENAL GLANDS: Enlarged left adrenal gland with a nodule  measuring 1.5 cm suspicious for metastatic disease.  KIDNEYS/URETERS: No evidence of hydronephrosis or suspicious  mass.     GASTROINTESTINAL: There are scattered colonic diverticula.  REPRODUCTIVE ORGANS: The uterus has been resected.  URINARY BLADDER: Unremarkable     VASCULAR: Unremarkable  LYMPH NODES: No pathologically enlarged nodes by size criteria.  PERITONEUM/RETROPERITONEUM: There is a small amount of ascites in  the abdomen and pelvis.      OSSEOUS STRUCTURES: Degenerative changes of both hips. The bones  appear demineralized. There is an old compression fracture of the  L1 vertebral body. There is an acute appearing nondisplaced  superior endplate fracture of the T11 vertebral body. There is a  cortical buckle of the anterior aspect of the S2 segment  suspicious for fracture. Lesions in the lumbar spine seen on the  recent MRI are not well appreciated on this CT.        IMPRESSION:  CONCLUSION:   1.  Enlarged nodular appearance of the liver with a few faintly  visualized hypodense nodules described above. The  enlarged  nodular appearance is likely due to the presence of diffuse  metastatic disease in the liver not appreciated on this  noncontrast exam.  2.  1.4 cm nodule in the lingular segment of the left upper lobe.  3.  1.5 cm left adrenal nodule suspicious for metastatic disease.  4.  Old compression fracture of the L1 vertebral body.  5.  Acute appearing appearing nondisplaced superior endplate  fracture of the T11 vertebral body.   6.  Cortical buckle of the anterior aspect of the S2 segment  suspicious for fracture.   7.  Lesions in the lumbar spine seen on the recent MRI are not  well appreciated on this CT.                    ASSESSMENT AND PLAN:       1.  Metastatic cancer involving bone, liver, adrenal and lung:  - Result of MRI of lumbar spine as well as CT of abdomen and pelvis without contrast were discussed with the patient, her son and and her daughter.  - Possibility of being metastatic cancer of unknown primary at this point were also discussed with patient and her daughter.  -Option of biopsy of liver lesion versus comfort care/hospice were again discussed with patient, her son and her daughter.  - Due to her very poor performance status, her chemotherapy or biopsy was not recommended.  - Patient is not interested in doing any biopsy or chemotherapy herself.  - Hospice was recommended.  Patient and her family is agreeable to hospice.  - Hospice referral has been placed today.     2.  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  - Hemoglobin is 10.2.  Will monitor with CBC for now     3.  Leukocytosis: Reactive.  Will monitor with CBC     4.  Elevated liver function test:  - Due to metastatic disease .  Monitor with CMP     5.  Elevated creatinine:  -Continue with IV hydration     6.  Back pain due to metastases:  -Continue with PRN [place] for now.     7.  CODE STATUS:  - Patient wishes to be no CPR or intubation    8.  Change in level of care:  -Patient is agreeable to hospice.   Referral for hospice has been placed today.           Abelino Leos MD  8/10/2019  11:55 AM        EMR Dragon/Transcription disclaimer:   Much of this encounter note is an electronic transcription/translation of spoken language to printed text. The electronic translation of spoken language may permit erroneous, or at times, nonsensical words or phrases to be inadvertently transcribed; Although I have reviewed the note for such errors, some may still exist.    Electronically signed by Abelino Leos MD at 8/10/2019 11:58 AM     Greg Zuniga MD at 8/10/2019  9:29 AM          MEDICINE DAILY PROGRESS NOTE  NAME: Rebecca Zaragoza  : 1935  MRN: [id number]     LOS: 0 days     PROVIDER OF SERVICE: Greg Zuniga MD    Chief Complaint: Fall. Back pain.    Subjective:   HPI: Rebecca Zaragoza is a 84 y.o. female presents with recent fall on back pain.  Patient also endorses weakness and difficulty ambulating secondary to low back pain.  Patient denies any fever, chills, nausea, vomiting, chest pain, diaphoresis, or dyspnea.  Patient does endorse some acute on chronic low back pain as well as weakness.     In the emergency department patient was taken to the MRI scanner for L-spine evaluation.  Patient was found to have diffuse multiple bony marrow infiltrative lesions of various size which was suggestive for multiple myeloma or metastatic disease.  Patient also with an acute T11 mild compression fracture.  Patient also had a CT pending at time of admission.    Interval History:  History taken from: patient chart family RN  8/10. Patient feeling some better. Pain better controlled with pain medication. Awaiting son to arrive to discuss case with oncology as a family.    Review of Systems:   Review of Systems   Constitutional: Positive for activity change and fatigue. Negative for appetite change and fever.   Respiratory: Negative for cough and shortness of breath.    Cardiovascular: Negative for chest pain and  palpitations.   Gastrointestinal: Negative for abdominal pain and nausea.   Genitourinary: Negative for difficulty urinating and dysuria.   Musculoskeletal: Positive for arthralgias, back pain and gait problem.   Skin: Positive for color change. Negative for rash.   Neurological: Positive for weakness. Negative for dizziness and headaches.   Psychiatric/Behavioral: Negative for agitation, confusion and sleep disturbance.       Objective:     Vital Signs  Vitals:    08/09/19 2344 08/10/19 0342 08/10/19 0512 08/10/19 0810   BP: 112/58 100/57  102/60   BP Location: Right arm Left arm  Left arm   Patient Position: Lying Lying  Lying   Pulse: 84 68  68   Resp: 18 18  18   Temp: 97.8 °F (36.6 °C) 97.7 °F (36.5 °C)  97.5 °F (36.4 °C)   TempSrc: Oral Axillary  Temporal   SpO2: 94% 94%  99%   Weight:   55.6 kg (122 lb 9.6 oz)    Height:           Physical Exam  Physical Exam   Constitutional: She is oriented to person, place, and time. She appears well-developed and well-nourished. No distress.   HENT:   Head: Normocephalic and atraumatic.   Right Ear: External ear normal.   Left Ear: External ear normal.   Nose: Nose normal.   Eyes: Pupils are equal, round, and reactive to light. Right eye exhibits no discharge. Left eye exhibits no discharge. Scleral icterus is present.   Neck: Neck supple. No thyromegaly present.   Cardiovascular: Normal rate, regular rhythm and normal heart sounds.   Pulmonary/Chest: Effort normal and breath sounds normal. No respiratory distress. She has no wheezes. She has no rales. She exhibits no tenderness.   Abdominal: Soft. Bowel sounds are normal. She exhibits no distension and no mass. There is no tenderness. There is no rebound and no guarding.   Musculoskeletal:   Patient again moves LE's symmetrically, but reduced ROM secondary to pain.   Neurological: She is alert and oriented to person, place, and time.   Skin: Skin is warm and dry. No rash noted. She is not diaphoretic. No erythema. No  pallor.   Psychiatric: She has a normal mood and affect. Her behavior is normal.   Nursing note and vitals reviewed.      Medication Review    Current Facility-Administered Medications:   •  diphenhydrAMINE (BENADRYL) 12.5 MG/5ML elixir 12.5 mg, 12.5 mg, Oral, Nightly PRN, Andrés Hill MD, 12.5 mg at 08/10/19 0008  •  famotidine (PEPCID) tablet 20 mg, 20 mg, Oral, Daily, Greg Zuniga MD, 20 mg at 08/10/19 0816  •  HYDROcodone-acetaminophen (NORCO) 5-325 MG per tablet 1 tablet, 1 tablet, Oral, Q4H PRN, Greg Zuniga MD, 1 tablet at 08/09/19 2059  •  ondansetron (ZOFRAN) injection 4 mg, 4 mg, Intravenous, Q6H PRN, Greg Zuniga MD  •  [COMPLETED] Insert peripheral IV, , , Once **AND** sodium chloride 0.9 % flush 10 mL, 10 mL, Intravenous, PRN, James Pathak PA-C  •  sodium chloride 0.9 % flush 3 mL, 3 mL, Intravenous, Q12H, Greg Zuniga MD, 3 mL at 08/10/19 0816  •  sodium chloride 0.9 % flush 3-10 mL, 3-10 mL, Intravenous, PRN, Greg Zuniga MD  •  sodium chloride 0.9 % infusion, 50 mL/hr, Intravenous, Continuous, Greg Zuniga MD, Last Rate: 50 mL/hr at 08/10/19 0616, 50 mL/hr at 08/10/19 0616     Diagnostic Data    Lab Results (last 24 hours)     Procedure Component Value Units Date/Time    Comprehensive Metabolic Panel [[id number]]  (Abnormal) Collected:  08/10/19 0552    Specimen:  Blood Updated:  08/10/19 0645     Glucose 144 mg/dL      BUN 27 mg/dL      Creatinine 1.47 mg/dL      Sodium 142 mmol/L      Potassium 3.7 mmol/L      Chloride 104 mmol/L      CO2 22.0 mmol/L      Calcium 8.1 mg/dL      Total Protein 5.5 g/dL      Albumin 3.20 g/dL      ALT (SGPT) 137 U/L      AST (SGOT) 208 U/L      Alkaline Phosphatase 472 U/L      Total Bilirubin 4.9 mg/dL      eGFR Non African Amer 34 mL/min/1.73      Globulin 2.3 gm/dL      A/G Ratio 1.4 g/dL      BUN/Creatinine Ratio 18.4     Anion Gap 16.0 mmol/L     Narrative:       GFR Normal >60  Chronic Kidney Disease <60  Kidney Failure <15    CBC &  Differential [[id number]] Collected:  08/10/19 0552    Specimen:  Blood Updated:  08/10/19 0642    Narrative:       The following orders were created for panel order CBC & Differential.  Procedure                               Abnormality         Status                     ---------                               -----------         ------                     Scan Slide[[id number]]                                                                  CBC Auto Differential[[id number]]        Abnormal            Final result                 Please view results for these tests on the individual orders.    CBC Auto Differential [[id number]]  (Abnormal) Collected:  08/10/19 0552    Specimen:  Blood Updated:  08/10/19 0639     WBC 14.56 10*3/mm3      RBC 3.40 10*6/mm3      Hemoglobin 10.2 g/dL      Hematocrit 30.4 %      MCV 89.4 fL      MCH 30.0 pg      MCHC 33.6 g/dL      RDW 19.8 %      RDW-SD 60.3 fl      MPV 12.1 fL      Platelets 130 10*3/mm3      Neutrophil % 85.8 %      Lymphocyte % 5.7 %      Monocyte % 4.5 %      Eosinophil % 0.2 %      Basophil % 0.1 %      Immature Grans % 3.7 %      Neutrophils, Absolute 12.49 10*3/mm3      Lymphocytes, Absolute 0.83 10*3/mm3      Monocytes, Absolute 0.65 10*3/mm3      Eosinophils, Absolute 0.03 10*3/mm3      Basophils, Absolute 0.02 10*3/mm3      Immature Grans, Absolute 0.54 10*3/mm3      nRBC 0.5 /100 WBC     Urinalysis, Microscopic Only - Urine, Clean Catch [[id number]]  (Abnormal) Collected:  08/09/19 2352    Specimen:  Urine, Clean Catch Updated:  08/10/19 0013     RBC, UA 6-12 /HPF      WBC, UA 6-12 /HPF      Bacteria, UA 1+ /HPF      Squamous Epithelial Cells, UA 0-2 /HPF      Hyaline Casts, UA 3-6 /LPF      Methodology Manual Light Microscopy    Urinalysis With Microscopic If Indicated (No Culture) - Urine, Clean Catch [[id number]]  (Abnormal) Collected:  08/09/19 2352    Specimen:  Urine, Clean Catch Updated:  08/10/19 0003     Color, UA Dark Yellow     Appearance, UA Cloudy      pH, UA 5.5     Specific Gravity, UA 1.019     Glucose, UA Negative     Ketones, UA 15 mg/dL (1+)     Bilirubin, UA Large (3+)     Blood, UA Negative     Protein, UA 30 mg/dL (1+)     Leuk Esterase, UA Small (1+)     Nitrite, UA Negative     Urobilinogen, UA 1.0 E.U./dL    Extra Tubes [[id number]] Collected:  08/09/19 1117    Specimen:  Blood, Venous Line Updated:  08/09/19 1230    Narrative:       The following orders were created for panel order Extra Tubes.  Procedure                               Abnormality         Status                     ---------                               -----------         ------                     Light Blue Top[[id number]]                                   Final result               Gold Top - SST[[id number]]                                   Final result                 Please view results for these tests on the individual orders.    Light Blue Top [[id number]] Collected:  08/09/19 1117    Specimen:  Blood Updated:  08/09/19 1230     Extra Tube hold for add-on     Comment: Auto resulted       Gold Top - SST [[id number]] Collected:  08/09/19 1117    Specimen:  Blood Updated:  08/09/19 1230     Extra Tube Hold for add-ons.     Comment: Auto resulted.       Comprehensive Metabolic Panel [[id number]]  (Abnormal) Collected:  08/09/19 1114    Specimen:  Blood Updated:  08/09/19 1144     Glucose 119 mg/dL      BUN 31 mg/dL      Creatinine 1.47 mg/dL      Sodium 142 mmol/L      Potassium 3.3 mmol/L      Chloride 100 mmol/L      CO2 22.0 mmol/L      Calcium 8.7 mg/dL      Total Protein 5.9 g/dL      Albumin 3.30 g/dL      ALT (SGPT) 143 U/L      AST (SGOT) 199 U/L      Alkaline Phosphatase 440 U/L      Total Bilirubin 4.8 mg/dL      eGFR Non African Amer 34 mL/min/1.73      Globulin 2.6 gm/dL      A/G Ratio 1.3 g/dL      BUN/Creatinine Ratio 21.1     Anion Gap 20.0 mmol/L     Narrative:       GFR Normal >60  Chronic Kidney Disease <60  Kidney Failure <15    CBC & Differential [[id number]]  Collected:  08/09/19 1114    Specimen:  Blood Updated:  08/09/19 1120    Narrative:       The following orders were created for panel order CBC & Differential.  Procedure                               Abnormality         Status                     ---------                               -----------         ------                     CBC Auto Differential[[id number]]        Abnormal            Final result                 Please view results for these tests on the individual orders.    CBC Auto Differential [[id number]]  (Abnormal) Collected:  08/09/19 1114    Specimen:  Blood Updated:  08/09/19 1120     WBC 15.38 10*3/mm3      RBC 3.59 10*6/mm3      Hemoglobin 10.6 g/dL      Hematocrit 31.8 %      MCV 88.6 fL      MCH 29.5 pg      MCHC 33.3 g/dL      RDW 19.1 %      RDW-SD 58.8 fl      MPV 12.3 fL      Platelets 153 10*3/mm3      Neutrophil % 86.7 %      Lymphocyte % 5.6 %      Monocyte % 4.4 %      Eosinophil % 0.0 %      Basophil % 0.2 %      Immature Grans % 3.1 %      Neutrophils, Absolute 13.33 10*3/mm3      Lymphocytes, Absolute 0.86 10*3/mm3      Monocytes, Absolute 0.68 10*3/mm3      Eosinophils, Absolute 0.00 10*3/mm3      Basophils, Absolute 0.03 10*3/mm3      Immature Grans, Absolute 0.48 10*3/mm3      nRBC 0.5 /100 WBC           I reviewed the patient's new clinical results.    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  Fall.     8/10. Family meeting with oncology today secondary to likely metastatic disease. Will await meeting and recommendations. Pain better controlled.  8/9. Patient with acute compression fracture as well as likely bone metastasis or multiple myeloma.  Have consulted hematology oncology.    #.  Weakness.  This appears to be acute on chronic.  This was exacerbated with patient's recent fall and back pain.  #.  Back pain.    8/10. Pain better controlled today per patient.  8/9. Acute on chronic.   Likely exacerbated by bony mets or multiple myeloma.  Hematology oncology consulted.  Have discussed the case with orthopedics.  No immediate surgical intervention needed.  Pain medication.  #.  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  8/10. Patient to have family meeting with oncology today after MRI and CT scan reviewed.  8/9. Hematology oncology consulted.  Patient and family to discuss options.  #.  Elevated LFTs.    8/10. Stable. IVFs. Monitor.  8/9. This is likely due to metastatic disease involving the liver.  Monitor.  Results from last 7 days   Lab Units 08/10/19  0552 08/09/19  1114   AST (SGOT) U/L 208* 199*   ALT (SGPT) U/L 137* 143*     Results from last 7 days   Lab Units 08/10/19  0552 08/09/19  1114   BILIRUBIN mg/dL 4.9* 4.8*   #. SEVERINO. Likely secondary to dehydration. IVF's. Monitor.  Results from last 7 days   Lab Units 08/10/19  0552 08/09/19  1114   CREATININE mg/dL 1.47* 1.47*       Will monitor patient's hospital course and adjust treatment as hospital course dictates.    DVT prophylaxis: SCDs  Code status is   Code Status and Medical Interventions:   Ordered at: 08/09/19 1524     Limited Support to NOT Include:    Intubation    Artificial Nutrition     Code Status:    No CPR     Medical Interventions (Level of Support Prior to Arrest):    Limited       Plan for disposition:Planning ongoing.      Time:           This document has been electronically signed by Greg Zuniga MD on August 10, 2019 9:30 AM     Electronically signed by Greg Zuniga MD at 8/10/2019  9:35 AM

## 2019-08-10 NOTE — CONSULTS
Adult Nutrition  Assessment    Patient Name:  Rebecca Zaragoza  YOB: 1935  MRN: [id number]  Admit Date:  8/9/2019    Assessment Date:  8/10/2019    Comments:  Daughter and son present.  Poor appetite reported.  15+ lb wt loss reported the past 3 mo.  Food preferences received.  Willing to receive Boost and milk with meals.  Intake 75% - 1x, 50% - 1x.  Meds and labs reviewed.    Reason for Assessment     Row Name 08/10/19 1510          Reason for Assessment    Reason For Assessment  identified at risk by screening criteria     Identified At Risk by Screening Criteria  MST SCORE 2+;unintentional loss of 10 lbs or more in the past 2 mos             Labs/Tests/Procedures/Meds     Row Name 08/10/19 1510          Labs/Procedures/Meds    Lab Results Reviewed  reviewed        Medications    Pertinent Medications Reviewed  reviewed           Estimated/Assessed Needs     Row Name 08/10/19 1511          Calculation Measurements    Weight Used For Calculations  56.8 kg (125 lb 3.5 oz)        Estimated/Assessed Needs    Additional Documentation  Calorie Requirements (Group);Fluid Requirements (Group);[place]-St. Jeor Equation (Group);Protein Requirements (Group)        Calorie Requirements    Estimated Calorie Requirement (kcal/day)  1337     Estimated Calorie Need Method  [place]-St Jeor        [place]-St. Jeor Equation    RMR ([place]-St. Jeor Equation)  1028.875        Protein Requirements    Est Protein Requirement Amount (gms/kg)  1.2 gm protein     Estimated Protein Requirements (gms/day)  68.16        Fluid Requirements    Estimated Fluid Requirements (mL/day)  1420     RDA Method (mL)  1420     Mya-Segar Method (over 20 kg)  2636         Nutrition Prescription Ordered     Row Name 08/10/19 1513          Nutrition Prescription PO    Current PO Diet  Regular         Evaluation of Received Nutrient/Fluid Intake     Row Name 08/10/19 1513 08/10/19 1511       Calculation Measurements    Weight Used For  Calculations  --  56.8 kg (125 lb 3.5 oz)       PO Evaluation    Number of Meals  2  --    % PO Intake  75% - 1x, 50% - 1x  --        Evaluation of Prescribed Nutrient/Fluid Intake     Row Name 08/10/19 1511          Calculation Measurements    Weight Used For Calculations  56.8 kg (125 lb 3.5 oz)             Electronically signed by:  Shahla Moralez RD  08/10/19 3:15 PM

## 2019-08-10 NOTE — PROGRESS NOTES
MEDICINE DAILY PROGRESS NOTE  NAME: Rebecca Zaragoza  : 1935  MRN: [id number]     LOS: 0 days     PROVIDER OF SERVICE: Greg Zuniga MD    Chief Complaint: Fall. Back pain.    Subjective:   HPI: Rebecca Zaragoza is a 84 y.o. female presents with recent fall on back pain.  Patient also endorses weakness and difficulty ambulating secondary to low back pain.  Patient denies any fever, chills, nausea, vomiting, chest pain, diaphoresis, or dyspnea.  Patient does endorse some acute on chronic low back pain as well as weakness.     In the emergency department patient was taken to the MRI scanner for L-spine evaluation.  Patient was found to have diffuse multiple bony marrow infiltrative lesions of various size which was suggestive for multiple myeloma or metastatic disease.  Patient also with an acute T11 mild compression fracture.  Patient also had a CT pending at time of admission.    Interval History:  History taken from: patient chart family RN  8/10. Patient feeling some better. Pain better controlled with pain medication. Awaiting son to arrive to discuss case with oncology as a family.    Review of Systems:   Review of Systems   Constitutional: Positive for activity change and fatigue. Negative for appetite change and fever.   Respiratory: Negative for cough and shortness of breath.    Cardiovascular: Negative for chest pain and palpitations.   Gastrointestinal: Negative for abdominal pain and nausea.   Genitourinary: Negative for difficulty urinating and dysuria.   Musculoskeletal: Positive for arthralgias, back pain and gait problem.   Skin: Positive for color change. Negative for rash.   Neurological: Positive for weakness. Negative for dizziness and headaches.   Psychiatric/Behavioral: Negative for agitation, confusion and sleep disturbance.       Objective:     Vital Signs  Vitals:    19 2344 08/10/19 0342 08/10/19 0512 08/10/19 0810   BP: 112/58 100/57  102/60   BP Location: Right arm Left  arm  Left arm   Patient Position: Lying Lying  Lying   Pulse: 84 68  68   Resp: 18 18  18   Temp: 97.8 °F (36.6 °C) 97.7 °F (36.5 °C)  97.5 °F (36.4 °C)   TempSrc: Oral Axillary  Temporal   SpO2: 94% 94%  99%   Weight:   55.6 kg (122 lb 9.6 oz)    Height:           Physical Exam  Physical Exam   Constitutional: She is oriented to person, place, and time. She appears well-developed and well-nourished. No distress.   HENT:   Head: Normocephalic and atraumatic.   Right Ear: External ear normal.   Left Ear: External ear normal.   Nose: Nose normal.   Eyes: Pupils are equal, round, and reactive to light. Right eye exhibits no discharge. Left eye exhibits no discharge. Scleral icterus is present.   Neck: Neck supple. No thyromegaly present.   Cardiovascular: Normal rate, regular rhythm and normal heart sounds.   Pulmonary/Chest: Effort normal and breath sounds normal. No respiratory distress. She has no wheezes. She has no rales. She exhibits no tenderness.   Abdominal: Soft. Bowel sounds are normal. She exhibits no distension and no mass. There is no tenderness. There is no rebound and no guarding.   Musculoskeletal:   Patient again moves LE's symmetrically, but reduced ROM secondary to pain.   Neurological: She is alert and oriented to person, place, and time.   Skin: Skin is warm and dry. No rash noted. She is not diaphoretic. No erythema. No pallor.   Psychiatric: She has a normal mood and affect. Her behavior is normal.   Nursing note and vitals reviewed.      Medication Review    Current Facility-Administered Medications:   •  diphenhydrAMINE (BENADRYL) 12.5 MG/5ML elixir 12.5 mg, 12.5 mg, Oral, Nightly PRN, Andrés Hill MD, 12.5 mg at 08/10/19 0008  •  famotidine (PEPCID) tablet 20 mg, 20 mg, Oral, Daily, Greg Zuniga MD, 20 mg at 08/10/19 0816  •  HYDROcodone-acetaminophen (NORCO) 5-325 MG per tablet 1 tablet, 1 tablet, Oral, Q4H PRN, Greg Zuniga MD, 1 tablet at 08/09/19 2059  •  ondansetron (ZOFRAN)  injection 4 mg, 4 mg, Intravenous, Q6H PRN, Greg Zuniga MD  •  [COMPLETED] Insert peripheral IV, , , Once **AND** sodium chloride 0.9 % flush 10 mL, 10 mL, Intravenous, PRN, James Pathak PA-C  •  sodium chloride 0.9 % flush 3 mL, 3 mL, Intravenous, Q12H, Greg Zuniga MD, 3 mL at 08/10/19 0816  •  sodium chloride 0.9 % flush 3-10 mL, 3-10 mL, Intravenous, PRN, Greg Zuniga MD  •  sodium chloride 0.9 % infusion, 50 mL/hr, Intravenous, Continuous, Greg Zuniga MD, Last Rate: 50 mL/hr at 08/10/19 0616, 50 mL/hr at 08/10/19 0616     Diagnostic Data    Lab Results (last 24 hours)     Procedure Component Value Units Date/Time    Comprehensive Metabolic Panel [[id number]]  (Abnormal) Collected:  08/10/19 0552    Specimen:  Blood Updated:  08/10/19 0645     Glucose 144 mg/dL      BUN 27 mg/dL      Creatinine 1.47 mg/dL      Sodium 142 mmol/L      Potassium 3.7 mmol/L      Chloride 104 mmol/L      CO2 22.0 mmol/L      Calcium 8.1 mg/dL      Total Protein 5.5 g/dL      Albumin 3.20 g/dL      ALT (SGPT) 137 U/L      AST (SGOT) 208 U/L      Alkaline Phosphatase 472 U/L      Total Bilirubin 4.9 mg/dL      eGFR Non African Amer 34 mL/min/1.73      Globulin 2.3 gm/dL      A/G Ratio 1.4 g/dL      BUN/Creatinine Ratio 18.4     Anion Gap 16.0 mmol/L     Narrative:       GFR Normal >60  Chronic Kidney Disease <60  Kidney Failure <15    CBC & Differential [[id number]] Collected:  08/10/19 0552    Specimen:  Blood Updated:  08/10/19 0642    Narrative:       The following orders were created for panel order CBC & Differential.  Procedure                               Abnormality         Status                     ---------                               -----------         ------                     Scan Slide[[id number]]                                                                  CBC Auto Differential[[id number]]        Abnormal            Final result                 Please view results for these tests on the  individual orders.    CBC Auto Differential [[id number]]  (Abnormal) Collected:  08/10/19 0552    Specimen:  Blood Updated:  08/10/19 0639     WBC 14.56 10*3/mm3      RBC 3.40 10*6/mm3      Hemoglobin 10.2 g/dL      Hematocrit 30.4 %      MCV 89.4 fL      MCH 30.0 pg      MCHC 33.6 g/dL      RDW 19.8 %      RDW-SD 60.3 fl      MPV 12.1 fL      Platelets 130 10*3/mm3      Neutrophil % 85.8 %      Lymphocyte % 5.7 %      Monocyte % 4.5 %      Eosinophil % 0.2 %      Basophil % 0.1 %      Immature Grans % 3.7 %      Neutrophils, Absolute 12.49 10*3/mm3      Lymphocytes, Absolute 0.83 10*3/mm3      Monocytes, Absolute 0.65 10*3/mm3      Eosinophils, Absolute 0.03 10*3/mm3      Basophils, Absolute 0.02 10*3/mm3      Immature Grans, Absolute 0.54 10*3/mm3      nRBC 0.5 /100 WBC     Urinalysis, Microscopic Only - Urine, Clean Catch [[id number]]  (Abnormal) Collected:  08/09/19 2352    Specimen:  Urine, Clean Catch Updated:  08/10/19 0013     RBC, UA 6-12 /HPF      WBC, UA 6-12 /HPF      Bacteria, UA 1+ /HPF      Squamous Epithelial Cells, UA 0-2 /HPF      Hyaline Casts, UA 3-6 /LPF      Methodology Manual Light Microscopy    Urinalysis With Microscopic If Indicated (No Culture) - Urine, Clean Catch [[id number]]  (Abnormal) Collected:  08/09/19 2352    Specimen:  Urine, Clean Catch Updated:  08/10/19 0003     Color, UA Dark Yellow     Appearance, UA Cloudy     pH, UA 5.5     Specific Gravity, UA 1.019     Glucose, UA Negative     Ketones, UA 15 mg/dL (1+)     Bilirubin, UA Large (3+)     Blood, UA Negative     Protein, UA 30 mg/dL (1+)     Leuk Esterase, UA Small (1+)     Nitrite, UA Negative     Urobilinogen, UA 1.0 E.U./dL    Extra Tubes [[id number]] Collected:  08/09/19 1117    Specimen:  Blood, Venous Line Updated:  08/09/19 1230    Narrative:       The following orders were created for panel order Extra Tubes.  Procedure                               Abnormality         Status                     ---------                                -----------         ------                     Light Blue Top[[id number]]                                   Final result               Gold Top - SST[[id number]]                                   Final result                 Please view results for these tests on the individual orders.    Light Blue Top [[id number]] Collected:  08/09/19 1117    Specimen:  Blood Updated:  08/09/19 1230     Extra Tube hold for add-on     Comment: Auto resulted       Gold Top - SST [[id number]] Collected:  08/09/19 1117    Specimen:  Blood Updated:  08/09/19 1230     Extra Tube Hold for add-ons.     Comment: Auto resulted.       Comprehensive Metabolic Panel [[id number]]  (Abnormal) Collected:  08/09/19 1114    Specimen:  Blood Updated:  08/09/19 1144     Glucose 119 mg/dL      BUN 31 mg/dL      Creatinine 1.47 mg/dL      Sodium 142 mmol/L      Potassium 3.3 mmol/L      Chloride 100 mmol/L      CO2 22.0 mmol/L      Calcium 8.7 mg/dL      Total Protein 5.9 g/dL      Albumin 3.30 g/dL      ALT (SGPT) 143 U/L      AST (SGOT) 199 U/L      Alkaline Phosphatase 440 U/L      Total Bilirubin 4.8 mg/dL      eGFR Non African Amer 34 mL/min/1.73      Globulin 2.6 gm/dL      A/G Ratio 1.3 g/dL      BUN/Creatinine Ratio 21.1     Anion Gap 20.0 mmol/L     Narrative:       GFR Normal >60  Chronic Kidney Disease <60  Kidney Failure <15    CBC & Differential [[id number]] Collected:  08/09/19 1114    Specimen:  Blood Updated:  08/09/19 1120    Narrative:       The following orders were created for panel order CBC & Differential.  Procedure                               Abnormality         Status                     ---------                               -----------         ------                     CBC Auto Differential[[id number]]        Abnormal            Final result                 Please view results for these tests on the individual orders.    CBC Auto Differential [[id number]]  (Abnormal) Collected:  08/09/19 1114    Specimen:  Blood  Updated:  08/09/19 1120     WBC 15.38 10*3/mm3      RBC 3.59 10*6/mm3      Hemoglobin 10.6 g/dL      Hematocrit 31.8 %      MCV 88.6 fL      MCH 29.5 pg      MCHC 33.3 g/dL      RDW 19.1 %      RDW-SD 58.8 fl      MPV 12.3 fL      Platelets 153 10*3/mm3      Neutrophil % 86.7 %      Lymphocyte % 5.6 %      Monocyte % 4.4 %      Eosinophil % 0.0 %      Basophil % 0.2 %      Immature Grans % 3.1 %      Neutrophils, Absolute 13.33 10*3/mm3      Lymphocytes, Absolute 0.86 10*3/mm3      Monocytes, Absolute 0.68 10*3/mm3      Eosinophils, Absolute 0.00 10*3/mm3      Basophils, Absolute 0.03 10*3/mm3      Immature Grans, Absolute 0.48 10*3/mm3      nRBC 0.5 /100 WBC           I reviewed the patient's new clinical results.    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  Fall.    8/10. Family meeting with oncology today secondary to likely metastatic disease. Will await meeting and recommendations. Pain better controlled.  8/9. Patient with acute compression fracture as well as likely bone metastasis or multiple myeloma.  Have consulted hematology oncology.    #.  Weakness.  This appears to be acute on chronic.  This was exacerbated with patient's recent fall and back pain.  #.  Back pain.    8/10. Pain better controlled today per patient.  8/9. Acute on chronic.  Likely exacerbated by bony mets or multiple myeloma.  Hematology oncology consulted.  Have discussed the case with orthopedics.  No immediate surgical intervention needed.  Pain medication.  #.  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  8/10. Patient to have family meeting with oncology today after MRI and CT scan reviewed.  8/9. Hematology oncology consulted.  Patient and family to discuss options.  #.  Elevated LFTs.    8/10. Stable. IVFs. Monitor.  8/9. This is likely due to metastatic disease involving the liver.  Monitor.  Results from last 7  days   Lab Units 08/10/19  0552 08/09/19  1114   AST (SGOT) U/L 208* 199*   ALT (SGPT) U/L 137* 143*     Results from last 7 days   Lab Units 08/10/19  0552 08/09/19  1114   BILIRUBIN mg/dL 4.9* 4.8*   #. SEVERINO. Likely secondary to dehydration. IVF's. Monitor.  Results from last 7 days   Lab Units 08/10/19  0552 08/09/19  1114   CREATININE mg/dL 1.47* 1.47*       Will monitor patient's hospital course and adjust treatment as hospital course dictates.    DVT prophylaxis: SCDs  Code status is   Code Status and Medical Interventions:   Ordered at: 08/09/19 1524     Limited Support to NOT Include:    Intubation    Artificial Nutrition     Code Status:    No CPR     Medical Interventions (Level of Support Prior to Arrest):    Limited       Plan for disposition:Planning ongoing.      Time:           This document has been electronically signed by Greg Zuniga MD on August 10, 2019 9:30 AM

## 2019-08-10 NOTE — PLAN OF CARE
Problem: Patient Care Overview  Goal: Plan of Care Review  Outcome: Ongoing (interventions implemented as appropriate)  Hospice to meet with family at 1000 on Sunday. PRN analgesics appear to be keeping Pt comfortable at this time. Pt and family seem to be showing some acceptance of advanced cancer diagnosis.    Problem: Fall Risk (Adult)  Goal: Identify Related Risk Factors and Signs and Symptoms  Outcome: Ongoing (interventions implemented as appropriate)      Problem: Skin Injury Risk (Adult)  Goal: Identify Related Risk Factors and Signs and Symptoms  Outcome: Outcome(s) achieved Date Met: 08/10/19    Goal: Skin Health and Integrity  Outcome: Ongoing (interventions implemented as appropriate)      Problem: Pain, Chronic (Adult)  Goal: Identify Related Risk Factors and Signs and Symptoms  Outcome: Outcome(s) achieved Date Met: 08/10/19    Goal: Acceptable Pain/Comfort Level and Functional Ability  Outcome: Ongoing (interventions implemented as appropriate)      Problem: Oncology Care (Adult)  Goal: Signs and Symptoms of Listed Potential Problems Will be Absent, Minimized or Managed (Oncology Care)  Outcome: Ongoing (interventions implemented as appropriate)

## 2019-08-10 NOTE — PROGRESS NOTES
"    HISTORY OF PRESENT ILLNESS:    Complains of generalized weakness.  Complains of back pain which is improved as compared to yesterday.  Denies any bleeding.  Denies any nausea or vomiting.      PAST MEDICAL HISTORY:    Past Medical History:   Diagnosis Date   • Osteopetrosis        SOCIAL HISTORY:    Social History     Tobacco Use   • Smoking status: Never Smoker   • Smokeless tobacco: Never Used   Substance Use Topics   • Alcohol use: No   • Drug use: No       Surgical History :  Past Surgical History:   Procedure Laterality Date   • ANKLE OPEN REDUCTION INTERNAL FIXATION Right 12/7/2017    Procedure: OPEN REDUCTION INTERNAL FIXATION RIGHT ANKLE       (C-ARM#3);  Surgeon: Marco A Ibrahim MD;  Location: [facility];  Service:    • FRACTURE SURGERY     • HYSTERECTOMY         ALLERGIES:    Allergies   Allergen Reactions   • Contrast Dye      Pt states \" I had red dye here and broke out in rash.\"   • Iodine Rash         FAMILY HISTORY:  Family History   Problem Relation Age of Onset   • Heart disease Other    • Hypertension Other    • Heart disease Mother    • Heart disease Father    • Mental illness Sister    • Heart disease Son            REVIEW OF SYSTEMS:      CONSTITUTIONAL:  Complains of fatigue.  Complains of loss of appetite.  Complains of recent weight loss.  Denies any fever, chills .      EYES: No visual disturbances. No discharge. No new lesions     ENMT:  No epistaxis, mouth sores or difficulty swallowing.     RESPIRATORY:  No new shortness of breath. No new cough or hemoptysis.     CARDIOVASCULAR:  No chest pain or palpitations.     GASTROINTESTINAL: Complains of chronic diarrhea.  No abdominal pain nausea, vomiting or blood in the stool.     GENITOURINARY: No Dysuria or Hematuria.     MUSCULOSKELETAL:Complains of recent worsening of chronic back pain and hip pain over last 3 months.     LYMPHATICS:  Denies any abnormal swollen glands anywhere in the body.     NEUROLOGICAL : No tingling or " "numbness. No headache or dizziness. No seizures or balance problems.     SKIN: No new skin lesions.     ENDOCRINE : No new heat or cold intolerance. No new polyuria . No polydipsia.     BREAST: Denies of feeling of any lump in the breast or any nipple discharge or any skin changes.             PHYSICAL EXAMINATION:      VITAL SIGNS:  /60 (BP Location: Left arm, Patient Position: Lying)   Pulse 68   Temp 97.5 °F (36.4 °C) (Temporal)   Resp 18   Ht 165.1 cm (65\")   Wt 55.6 kg (122 lb 9.6 oz)   SpO2 99%   BMI 20.40 kg/m²       08/09/19  1003 08/09/19  1454 08/10/19  0512   Weight: 57.2 kg (126 lb) 54.6 kg (120 lb 6.4 oz) 55.6 kg (122 lb 9.6 oz)         ECOG performance status: 3     CONSTITUTIONAL:  Not in any distress.  Appears frail.     EYES: Mild conjunctival Pallor.  Icterus present. No Pterygium. Extraocular Movements intact.No ptosis.     ENMT:  Normocephalic, Atraumatic.No Facial Asymmetry noted.     NECK:  No adenopathy.Trachea midline. NO JVD.     RESPIRATORY:  Fair air entry bilateral. No rhonchi or wheezing.Fair respiratory effort.     CARDIOVASCULAR:  S1, S2. Regular rate and rhythm. No murmur or gallop appreciated.     ABDOMEN:  Soft, nontender. Bowel sounds present in all four quadrants.  No Hepatosplenomegaly appreciated.     MUSCULOSKELETAL:  No edema.No Calf Tenderness.Decreased range of motion.     NEUROLOGIC:    No  Motor deficit. Cranial Nerves 2-12 grossly intact.     SKIN : Icterus present. Skin is warm and dry to touch.     LYMPHATICS: No new enlarged lymph nodes in neck or supraclavicular area.     PSYCHIATRY: Alert, awake and oriented ×3.             DIAGNOSTIC DATA:    Glucose   Date Value Ref Range Status   08/10/2019 144 (H) 65 - 99 mg/dL Final     Sodium   Date Value Ref Range Status   08/10/2019 142 136 - 145 mmol/L Final     Potassium   Date Value Ref Range Status   08/10/2019 3.7 3.5 - 5.2 mmol/L Final     CO2   Date Value Ref Range Status   08/10/2019 22.0 22.0 - 29.0 " mmol/L Final     Chloride   Date Value Ref Range Status   08/10/2019 104 98 - 107 mmol/L Final     Anion Gap   Date Value Ref Range Status   08/10/2019 16.0 (H) 5.0 - 15.0 mmol/L Final     Creatinine   Date Value Ref Range Status   08/10/2019 1.47 (H) 0.57 - 1.00 mg/dL Final     BUN   Date Value Ref Range Status   08/10/2019 27 (H) 8 - 23 mg/dL Final     BUN/Creatinine Ratio   Date Value Ref Range Status   08/10/2019 18.4 7.0 - 25.0 Final     Calcium   Date Value Ref Range Status   08/10/2019 8.1 (L) 8.6 - 10.5 mg/dL Final     eGFR Non  Amer   Date Value Ref Range Status   08/10/2019 34 (L) >60 mL/min/1.73 Final     Alkaline Phosphatase   Date Value Ref Range Status   08/10/2019 472 (H) 39 - 117 U/L Final     Total Protein   Date Value Ref Range Status   08/10/2019 5.5 (L) 6.0 - 8.5 g/dL Final     ALT (SGPT)   Date Value Ref Range Status   08/10/2019 137 (H) 1 - 33 U/L Final     AST (SGOT)   Date Value Ref Range Status   08/10/2019 208 (H) 1 - 32 U/L Final     Total Bilirubin   Date Value Ref Range Status   08/10/2019 4.9 (H) 0.2 - 1.2 mg/dL Final     Albumin   Date Value Ref Range Status   08/10/2019 3.20 (L) 3.50 - 5.20 g/dL Final     Globulin   Date Value Ref Range Status   08/10/2019 2.3 gm/dL Final     Lab Results   Component Value Date    WBC 14.56 (H) 08/10/2019    HGB 10.2 (L) 08/10/2019    HCT 30.4 (L) 08/10/2019    MCV 89.4 08/10/2019     (L) 08/10/2019     Lab Results   Component Value Date    NEUTROABS 12.49 (H) 08/10/2019     No results found for: , LABCA2, AFPTM, HCGQUANT, , CHROMGRNA, [id number], CEA, REFLABREPO        Radiology Data :  MRI of  lumbar spine without contrast done on August 9, 2019 showed:  IMPRESSION:  1.  Diffuse lumbar spine, sacrum, and iliac wing multiple bony  marrow infiltrative lesions of varying sizes which are low signal  on T1 weighting and higher signal on fat-suppressed T2 weighting.  These lesions are highly suspicious for metastatic disease  and/or  multiple myeloma. Recommend clinical correlation.  2.  Acute T11 mild compression fracture with loss of diffuse  vertebral body height by 15%. Associated oblique nondisplaced  fracture line component involving the posterior T11 vertebral  body.  3.  Old L1 anterior vertebral body wedge compression fracture  with loss of anterior vertebral body height by 80%.  4.  T12-L1: Mild anterior broad-based disc protrusion. Spinal  cord and nerve roots exit this to space level without compromise.        CT of abdomen and pelvis without contrast done on August 9, 2019 was reviewed, discussed with patient, it showed:  FINDINGS:      LOWER CHEST: There is a 1.4 cm nodule in the lingular segment of  the left upper lobe seen on axial image seven. There is a tiny  right pleural effusion.     HEPATOBILIARY: The liver has a nodular appearance. There are  faintly visualized hypodense lesions in the right lobe of the  liver with one of the largest measuring 2.4 cm seen on axial  image 45. The largest in the left lobe is seen on axial image 31  measuring 2.8 cm. Compared to the prior exam, the liver appears  much larger and the contour much more nodular, probably due to  the presence of metastatic disease throughout the liver not well  appreciated on this noncontrast study.  SPLEEN: Unremarkable.  PANCREAS: Unremarkable  ADRENAL GLANDS: Enlarged left adrenal gland with a nodule  measuring 1.5 cm suspicious for metastatic disease.  KIDNEYS/URETERS: No evidence of hydronephrosis or suspicious  mass.     GASTROINTESTINAL: There are scattered colonic diverticula.  REPRODUCTIVE ORGANS: The uterus has been resected.  URINARY BLADDER: Unremarkable     VASCULAR: Unremarkable  LYMPH NODES: No pathologically enlarged nodes by size criteria.  PERITONEUM/RETROPERITONEUM: There is a small amount of ascites in  the abdomen and pelvis.      OSSEOUS STRUCTURES: Degenerative changes of both hips. The bones  appear demineralized. There is an old  compression fracture of the  L1 vertebral body. There is an acute appearing nondisplaced  superior endplate fracture of the T11 vertebral body. There is a  cortical buckle of the anterior aspect of the S2 segment  suspicious for fracture. Lesions in the lumbar spine seen on the  recent MRI are not well appreciated on this CT.        IMPRESSION:  CONCLUSION:   1.  Enlarged nodular appearance of the liver with a few faintly  visualized hypodense nodules described above. The enlarged  nodular appearance is likely due to the presence of diffuse  metastatic disease in the liver not appreciated on this  noncontrast exam.  2.  1.4 cm nodule in the lingular segment of the left upper lobe.  3.  1.5 cm left adrenal nodule suspicious for metastatic disease.  4.  Old compression fracture of the L1 vertebral body.  5.  Acute appearing appearing nondisplaced superior endplate  fracture of the T11 vertebral body.   6.  Cortical buckle of the anterior aspect of the S2 segment  suspicious for fracture.   7.  Lesions in the lumbar spine seen on the recent MRI are not  well appreciated on this CT.                    ASSESSMENT AND PLAN:       1.  Metastatic cancer involving bone, liver, adrenal and lung:  - Result of MRI of lumbar spine as well as CT of abdomen and pelvis without contrast were discussed with the patient, her son and and her daughter.  - Possibility of being metastatic cancer of unknown primary at this point were also discussed with patient and her daughter.  -Option of biopsy of liver lesion versus comfort care/hospice were again discussed with patient, her son and her daughter.  - Due to her very poor performance status, her chemotherapy or biopsy was not recommended.  - Patient is not interested in doing any biopsy or chemotherapy herself.  - Hospice was recommended.  Patient and her family is agreeable to hospice.  - Hospice referral has been placed today.     2.  Anemia:  - Secondary to anemia of chronic disease  from malignancy.  - Hemoglobin is 10.2.  Will monitor with CBC for now     3.  Leukocytosis: Reactive.  Will monitor with CBC     4.  Elevated liver function test:  - Due to metastatic disease .  Monitor with CMP     5.  Elevated creatinine:  -Continue with IV hydration     6.  Back pain due to metastases:  -Continue with PRN [place] for now.     7.  CODE STATUS:  - Patient wishes to be no CPR or intubation    8.  Change in level of care:  -Patient is agreeable to hospice.  Referral for hospice has been placed today.           Abelino Leos MD  8/10/2019  11:55 AM        EMR Dragon/Transcription disclaimer:   Much of this encounter note is an electronic transcription/translation of spoken language to printed text. The electronic translation of spoken language may permit erroneous, or at times, nonsensical words or phrases to be inadvertently transcribed; Although I have reviewed the note for such errors, some may still exist.

## 2019-08-10 NOTE — PLAN OF CARE
Problem: Patient Care Overview  Goal: Plan of Care Review  Outcome: Ongoing (interventions implemented as appropriate)  Encourage intake of meals and supplements.   08/10/19 1521   Coping/Psychosocial   Plan of Care Reviewed With patient;daughter;son   Plan of Care Review   Progress no change   OTHER   Outcome Summary inital assessment

## 2019-08-10 NOTE — PLAN OF CARE
Problem: Patient Care Overview  Goal: Plan of Care Review  Outcome: Ongoing (interventions implemented as appropriate)   08/10/19 0307   Coping/Psychosocial   Plan of Care Reviewed With patient   Plan of Care Review   Progress no change   OTHER   Outcome Summary Patient complaining of back and leg pain. PRN norco given and effective. VSS. Will continue to monitor.      Goal: Individualization and Mutuality  Outcome: Ongoing (interventions implemented as appropriate)    Goal: Discharge Needs Assessment  Outcome: Ongoing (interventions implemented as appropriate)    Goal: Interprofessional Rounds/Family Conf  Outcome: Ongoing (interventions implemented as appropriate)      Problem: Fall Risk (Adult)  Goal: Identify Related Risk Factors and Signs and Symptoms  Outcome: Ongoing (interventions implemented as appropriate)    Goal: Absence of Fall  Outcome: Outcome(s) achieved Date Met: 08/10/19      Problem: Skin Injury Risk (Adult)  Goal: Identify Related Risk Factors and Signs and Symptoms  Outcome: Ongoing (interventions implemented as appropriate)    Goal: Skin Health and Integrity  Outcome: Ongoing (interventions implemented as appropriate)      Problem: Pain, Chronic (Adult)  Goal: Identify Related Risk Factors and Signs and Symptoms  Outcome: Ongoing (interventions implemented as appropriate)    Goal: Acceptable Pain/Comfort Level and Functional Ability  Outcome: Ongoing (interventions implemented as appropriate)      Problem: Oncology Care (Adult)  Goal: Signs and Symptoms of Listed Potential Problems Will be Absent, Minimized or Managed (Oncology Care)  Outcome: Ongoing (interventions implemented as appropriate)

## 2019-08-11 PROBLEM — C79.9 METASTATIC CANCER (HCC): Status: ACTIVE | Noted: 2019-01-01

## 2019-08-11 NOTE — NURSING NOTE
Met with patient and family, explained Hospice services.  Patient/family verbalized understanding and desire Hospice when discharged.  DME needed:HB, OBT, BSC, W/C and ramp.  Patient will be moving in with her son Rob.  Thank you for the referral.

## 2019-08-11 NOTE — PROGRESS NOTES
MEDICINE DAILY PROGRESS NOTE  NAME: Rebecca Zaragoza  : 1935  MRN: [id number]     LOS: 0 days     PROVIDER OF SERVICE: Greg Zuniga MD    Chief Complaint: Fall. Back pain.    Subjective:   HPI: Rebecca Zaragoza is a 84 y.o. female presents with recent fall on back pain.  Patient also endorses weakness and difficulty ambulating secondary to low back pain.  Patient denies any fever, chills, nausea, vomiting, chest pain, diaphoresis, or dyspnea.  Patient does endorse some acute on chronic low back pain as well as weakness.     In the emergency department patient was taken to the MRI scanner for L-spine evaluation.  Patient was found to have diffuse multiple bony marrow infiltrative lesions of various size which was suggestive for multiple myeloma or metastatic disease.  Patient also with an acute T11 mild compression fracture.  Patient also had a CT pending at time of admission.    Interval History:  History taken from: patient chart family RN  . Patient with some confusion this morning. She reports better pain control. Plan for hospice evaluation today. Family at bedside. All questions answered.  8/10. Patient feeling some better. Pain better controlled with pain medication. Awaiting son to arrive to discuss case with oncology as a family.    Review of Systems:   Review of Systems   Constitutional: Positive for activity change and fatigue. Negative for appetite change and fever.   Respiratory: Negative for cough and shortness of breath.    Cardiovascular: Negative for chest pain and palpitations.   Gastrointestinal: Negative for abdominal pain and nausea.   Genitourinary: Negative for difficulty urinating and dysuria.   Musculoskeletal: Positive for arthralgias, back pain and gait problem.   Skin: Positive for color change. Negative for rash.   Neurological: Positive for weakness. Negative for dizziness and headaches.   Psychiatric/Behavioral: Positive for confusion. Negative for agitation and sleep  disturbance.       Objective:     Vital Signs  Vitals:    08/10/19 2303 08/11/19 0300 08/11/19 0501 08/11/19 0829   BP: 135/65 135/76  102/56   BP Location: Right arm Right arm  Left arm   Patient Position: Lying Lying  Lying   Pulse: 84 88  83   Resp: 18 18  18   Temp: 96.9 °F (36.1 °C) 97.4 °F (36.3 °C)  97.6 °F (36.4 °C)   TempSrc: Oral Axillary  Temporal   SpO2: 92% 92%  93%   Weight:   54.5 kg (120 lb 1.6 oz)    Height:           Physical Exam  Physical Exam   Constitutional: She is oriented to person, place, and time. She appears well-developed and well-nourished. No distress.   HENT:   Head: Normocephalic and atraumatic.   Right Ear: External ear normal.   Left Ear: External ear normal.   Nose: Nose normal.   Eyes: Pupils are equal, round, and reactive to light. Right eye exhibits no discharge. Left eye exhibits no discharge. Scleral icterus is present.   Neck: Neck supple. No thyromegaly present.   Cardiovascular: Normal rate, regular rhythm and normal heart sounds.   Pulmonary/Chest: Effort normal and breath sounds normal. No respiratory distress. She has no wheezes. She has no rales. She exhibits no tenderness.   Abdominal: Soft. Bowel sounds are normal. She exhibits no distension and no mass. There is no tenderness. There is no rebound and no guarding.   Musculoskeletal:   Patient again moves LE's symmetrically, but reduced ROM secondary to pain.   Neurological: She is alert and oriented to person, place, and time.   Skin: Skin is warm and dry. No rash noted. She is not diaphoretic. No erythema. No pallor.   Psychiatric: She has a normal mood and affect. Her behavior is normal.   Nursing note and vitals reviewed.      Medication Review    Current Facility-Administered Medications:   •  diphenhydrAMINE (BENADRYL) 12.5 MG/5ML elixir 12.5 mg, 12.5 mg, Oral, Nightly PRN, Andrés Hill MD, 12.5 mg at 08/10/19 0008  •  famotidine (PEPCID) tablet 20 mg, 20 mg, Oral, Daily, Greg Zuniga MD, 20 mg at  08/11/19 0834  •  morphine injection 1 mg, 1 mg, Intravenous, Q6H PRN, Greg Zuniga MD, 1 mg at 08/10/19 2124  •  ondansetron (ZOFRAN) injection 4 mg, 4 mg, Intravenous, Q6H PRN, Greg Zuniga MD  •  [COMPLETED] Insert peripheral IV, , , Once **AND** sodium chloride 0.9 % flush 10 mL, 10 mL, Intravenous, PRN, James Pathak PA-C  •  sodium chloride 0.9 % flush 3 mL, 3 mL, Intravenous, Q12H, Greg Zuniga MD, 3 mL at 08/11/19 0834  •  sodium chloride 0.9 % flush 3-10 mL, 3-10 mL, Intravenous, PRN, Greg Zuniga MD     Diagnostic Data    Lab Results (last 24 hours)     Procedure Component Value Units Date/Time    CBC & Differential [[id number]] Collected:  08/11/19 0638    Specimen:  Blood Updated:  08/11/19 5399    Narrative:       The following orders were created for panel order CBC & Differential.  Procedure                               Abnormality         Status                     ---------                               -----------         ------                     Scan Slide[[id number]]                                                                  CBC Auto Differential[[id number]]        Abnormal            Final result                 Please view results for these tests on the individual orders.    Comprehensive Metabolic Panel [[id number]]  (Abnormal) Collected:  08/11/19 0638    Specimen:  Blood Updated:  08/11/19 0740     Glucose 117 mg/dL      BUN 24 mg/dL      Creatinine 1.24 mg/dL      Sodium 145 mmol/L      Potassium 3.5 mmol/L      Chloride 109 mmol/L      CO2 22.0 mmol/L      Calcium 8.0 mg/dL      Total Protein 5.4 g/dL      Albumin 3.10 g/dL      ALT (SGPT) 126 U/L      AST (SGOT) 182 U/L      Alkaline Phosphatase 445 U/L      Total Bilirubin 4.4 mg/dL      eGFR Non African Amer 41 mL/min/1.73      Globulin 2.3 gm/dL      A/G Ratio 1.3 g/dL      BUN/Creatinine Ratio 19.4     Anion Gap 14.0 mmol/L     Narrative:       GFR Normal >60  Chronic Kidney Disease <60  Kidney Failure <15    CBC  Auto Differential [[id number]]  (Abnormal) Collected:  08/11/19 0638    Specimen:  Blood Updated:  08/11/19 0725     WBC 14.66 10*3/mm3      RBC 3.41 10*6/mm3      Hemoglobin 10.2 g/dL      Hematocrit 30.8 %      MCV 90.3 fL      MCH 29.9 pg      MCHC 33.1 g/dL      RDW 20.9 %      RDW-SD 63.8 fl      MPV 12.1 fL      Platelets 121 10*3/mm3      Neutrophil % 85.4 %      Lymphocyte % 6.3 %      Monocyte % 4.1 %      Eosinophil % 0.1 %      Basophil % 0.1 %      Immature Grans % 4.0 %      Neutrophils, Absolute 12.52 10*3/mm3      Lymphocytes, Absolute 0.92 10*3/mm3      Monocytes, Absolute 0.60 10*3/mm3      Eosinophils, Absolute 0.02 10*3/mm3      Basophils, Absolute 0.02 10*3/mm3      Immature Grans, Absolute 0.58 10*3/mm3      nRBC 0.5 /100 WBC           I reviewed the patient's new clinical results.    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  Metastatic cancer. Patient with likely metastatic cancer involving bone, adrenal, liver, and lung.   8/11. Hospice meeting today. Awaiting recommendations.  8/10. Family meeting with oncology today secondary to likely metastatic disease. Will await meeting and recommendations. Pain better controlled.  8/9. Patient with acute compression fracture as well as likely bone metastasis or multiple myeloma.  Have consulted hematology oncology.    #.  Weakness.  This appears to be acute on chronic.  This was exacerbated with patient's recent fall and back pain.  #.  Back pain.    8/11. Pain better controlled.  8/10. Pain better controlled today per patient.  8/9. Acute on chronic.  Likely exacerbated by bony mets or multiple myeloma.  Hematology oncology consulted.  Have discussed the case with orthopedics.  No immediate surgical intervention needed.  Pain medication.  #.  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  8/11.  Hospice meeting today. Awaiting recommendations.  8/10. Patient to have family meeting with oncology today after MRI and CT scan reviewed.  8/9. Hematology oncology consulted.  Patient and family to discuss options.  #.  Elevated LFTs.    8/11. Stable.  8/10. Stable. IVFs. Monitor.  8/9. This is likely due to metastatic disease involving the liver.  Monitor.  Results from last 7 days   Lab Units 08/11/19  0638 08/10/19  0552 08/09/19  1114   AST (SGOT) U/L 182* 208* 199*   ALT (SGPT) U/L 126* 137* 143*     Results from last 7 days   Lab Units 08/11/19  0638 08/10/19  0552 08/09/19  1114   BILIRUBIN mg/dL 4.4* 4.9* 4.8*   #. SEVERINO.   8/11. Improving.  8/10. Likely secondary to dehydration. IVF's. Monitor.  Results from last 7 days   Lab Units 08/11/19  0638 08/10/19  0552 08/09/19  1114   CREATININE mg/dL 1.24* 1.47* 1.47*       Will monitor patient's hospital course and adjust treatment as hospital course dictates.    DVT prophylaxis: SCDs  Code status is   Code Status and Medical Interventions:   Ordered at: 08/09/19 1524     Limited Support to NOT Include:    Intubation    Artificial Nutrition     Code Status:    No CPR     Medical Interventions (Level of Support Prior to Arrest):    Limited       Plan for disposition:Planning ongoing.      Time:           This document has been electronically signed by Greg Zuniga MD on August 11, 2019 10:48 AM

## 2019-08-12 NOTE — PROGRESS NOTES
Adult Nutrition  Assessment    Patient Name:  Rebecca Zaragoza  YOB: 1935  MRN: [id number]  Admit Date:  8/9/2019    Assessment Date:  8/12/2019    Comments:  RD visited pt this am- she was awake but confused and had not eaten breakfast. Noted dx: metastatic cancer to bone, liver, adrenal and lung with unknown primary- to be d/c with hospice. Family n/a at time of visit. RD left information for increasing pro and calories at pt bedside. RD to follow hospital course if not d/c as planned.    Anthropometrics     Row Name 08/12/19 0509          Anthropometrics    Weight  54.4 kg (119 lb 14.4 oz)           Electronically signed by:  Laine Newman RD  08/12/19 10:29 AM

## 2019-08-12 NOTE — DISCHARGE SUMMARY
DISCHARGE SUMMARY    NAME: Rebecca Zaragoza   PHYSICIAN: Greg Zuniga MD  : 1935  MRN: [id number]    ADMITTED: 2019   DISCHARGED: 19    ADMISSION DIAGNOSES:  Present on Admission:  • Malignant neoplasm of lumbar vertebra (CMS/HCC)  • Metastatic cancer (CMS/HCC)    DISCHARGE DIAGNOSES:     Metastatic cancer (CMS/HCC)    Malignant neoplasm of lumbar vertebra (CMS/HCC)      SERVICE: Medicine. Attending Greg Zuniga MD    CONSULTS:   Consult Orders (all) (From admission, onward)    Start     Ordered    08/10/19 1117  Inpatient Case Management  Consult  STAT     Provider:  (Not yet assigned)    08/10/19 1116    19 1802  Inpatient Nutrition Consult  Once     Provider:  (Not yet assigned)    19 1804    19 1346  Hematology & Oncology Inpatient Consult  Once     Specialty:  Hematology and Oncology  Provider:  Abelino Leos MD    19 1346    19 1317  Hospitalist (on-call MD unless specified)  Once     Specialty:  Hospitalist  Provider:  Greg Zuniga MD    19 1316          PROCEDURES:   Imaging Results (last 7 days)     Procedure Component Value Units Date/Time    CT Abdomen Pelvis Without Contrast [[id number]] Collected:  19 1607     Updated:  19 1638    Narrative:       PROCEDURE: CT abdomen and pelvis without intravenous contrast    HISTORY: Possible lumbosacral metastatic disease, elevated liver  enzymes/bilirubin, C41.2 Malignant neoplasm of vertebral column  R74.8 Abnormal levels of other serum enzymes S22.080A Wedge  compression fracture of t11-T12 vertebra, initial encounter for  closed fracture    This exam was performed using radiation doses that are as low as  reasonably achievable (ALARA).  This exam was performed according to our departmental dose  optimization program, which includes automated exposure control,  adjustment of the mA and/or KV according to patient size and/or  use of iterative reconstruction  technique.    COMPARISON: Lumbar MRI dated 8/9/2019. CT abdomen and pelvis  dated June 16, 2018.    CONTRAST: None    TECHNIQUE: Multiple contiguous noncontrast axial images are  obtained of the abdomen and pelvis.     FINDINGS:     LOWER CHEST: There is a 1.4 cm nodule in the lingular segment of  the left upper lobe seen on axial image seven. There is a tiny  right pleural effusion.    HEPATOBILIARY: The liver has a nodular appearance. There are  faintly visualized hypodense lesions in the right lobe of the  liver with one of the largest measuring 2.4 cm seen on axial  image 45. The largest in the left lobe is seen on axial image 31  measuring 2.8 cm. Compared to the prior exam, the liver appears  much larger and the contour much more nodular, probably due to  the presence of metastatic disease throughout the liver not well  appreciated on this noncontrast study.  SPLEEN: Unremarkable.  PANCREAS: Unremarkable  ADRENAL GLANDS: Enlarged left adrenal gland with a nodule  measuring 1.5 cm suspicious for metastatic disease.  KIDNEYS/URETERS: No evidence of hydronephrosis or suspicious  mass.    GASTROINTESTINAL: There are scattered colonic diverticula.  REPRODUCTIVE ORGANS: The uterus has been resected.  URINARY BLADDER: Unremarkable    VASCULAR: Unremarkable  LYMPH NODES: No pathologically enlarged nodes by size criteria.  PERITONEUM/RETROPERITONEUM: There is a small amount of ascites in  the abdomen and pelvis.     OSSEOUS STRUCTURES: Degenerative changes of both hips. The bones  appear demineralized. There is an old compression fracture of the  L1 vertebral body. There is an acute appearing nondisplaced  superior endplate fracture of the T11 vertebral body. There is a  cortical buckle of the anterior aspect of the S2 segment  suspicious for fracture. Lesions in the lumbar spine seen on the  recent MRI are not well appreciated on this CT.        Impression:       CONCLUSION:   1.  Enlarged nodular appearance of the  liver with a few faintly  visualized hypodense nodules described above. The enlarged  nodular appearance is likely due to the presence of diffuse  metastatic disease in the liver not appreciated on this  noncontrast exam.  2.  1.4 cm nodule in the lingular segment of the left upper lobe.  3.  1.5 cm left adrenal nodule suspicious for metastatic disease.  4.  Old compression fracture of the L1 vertebral body.  5.  Acute appearing appearing nondisplaced superior endplate  fracture of the T11 vertebral body.   6.  Cortical buckle of the anterior aspect of the S2 segment  suspicious for fracture.   7.  Lesions in the lumbar spine seen on the recent MRI are not  well appreciated on this CT.    Electronically signed by:  Yoseph To MD  8/9/2019 4:37 PM CDT  Workstation: [id number]    MRI Lumbar Spine Without Contrast [[id number]] Collected:  08/09/19 1020     Updated:  08/09/19 1141    Narrative:       Procedure: MRI lumbar spine without contrast    Reason for exam: Low back pain following fall    FINDINGS: Multisequence multiplanar MR imaging of the lumbar  spine was performed without contrast. Old L1 anterior vertebral  body wedge compression fracture with loss of anterior vertebral  body height by 80%. Acute T11 mild compression fracture with mild  loss of diffuse vertebral body height by 15% and associated  posterior vertebral body oblique linear focus of low signal on  all weight sequences with surrounding lower signal on T1  weighting which is higher signal on T2 weighting. No other  evidence of acute fracture or dislocation. Diffuse lumbar spine,  sacrum, and iliac wing multiple bony marrow infiltrative lesions  of varying sizes which are low signal on T1 weighting and higher  signal on fat-suppressed T2 weighting. The conus of the spinal  cord appears normal with tip at the L1 vertebral body level.    T11-T12: Disc space normal. No disc protrusion or extrusion.  Spinal cord and nerve roots exit without  compromise.    T12-L1: Mild anterior broad-based disc protrusion. Spinal cord  and nerve roots exit this to space level without compromise.    L1-L2: Disc space normal. No disc protrusion or extrusion. Nerve  roots exit without compromise.    L2-L3: Disc space normal. No disc protrusion or extrusion. Nerve  roots exit without compromise.    L3-L4: Disc space normal. No disc protrusion or extrusion. Nerve  roots exit without compromise.    L4-L5: Disc space normal. No disc protrusion or extrusion. Nerve  roots exit without compromise.    L5-S1: Disc space normal. No disc protrusion or extrusion. Nerve  roots exit without compromise.      Impression:       1.  Diffuse lumbar spine, sacrum, and iliac wing multiple bony  marrow infiltrative lesions of varying sizes which are low signal  on T1 weighting and higher signal on fat-suppressed T2 weighting.  These lesions are highly suspicious for metastatic disease and/or  multiple myeloma. Recommend clinical correlation.  2.  Acute T11 mild compression fracture with loss of diffuse  vertebral body height by 15%. Associated oblique nondisplaced  fracture line component involving the posterior T11 vertebral  body.  3.  Old L1 anterior vertebral body wedge compression fracture  with loss of anterior vertebral body height by 80%.  4.  T12-L1: Mild anterior broad-based disc protrusion. Spinal  cord and nerve roots exit this to space level without compromise.  5.  Findings discussed with ER physician by phone at 11:38 AM on  8/9/2019.    Electronically signed by:  Brian Nagy MD  8/9/2019 11:40 AM CDT  Workstation: [id number]          HISTORY OF PRESENT ILLNESS:   Rebecca Zaragoza is a 84 y.o. female presents with recent fall on back pain.  Patient also endorses weakness and difficulty ambulating secondary to low back pain.  Patient denies any fever, chills, nausea, vomiting, chest pain, diaphoresis, or dyspnea.  Patient does endorse some acute on chronic low back pain as well as  weakness.     In the emergency department patient was taken to the MRI scanner for L-spine evaluation.  Patient was found to have diffuse multiple bony marrow infiltrative lesions of various size which was suggestive for multiple myeloma or metastatic disease.  Patient also with an acute T11 mild compression fracture.  Patient also had a CT pending at time of admission.    DIAGNOSTIC DATA:   Lab Results (last 7 days)     Procedure Component Value Units Date/Time    Manual Differential [[id number]]  (Abnormal) Collected:  08/12/19 0618    Specimen:  Blood Updated:  08/12/19 1008     Neutrophil % 83.0 %      Lymphocyte % 4.0 %      Monocyte % 6.0 %      Eosinophil % 3.0 %      Bands %  1.0 %      Metamyelocyte % 3.0 %      Neutrophils Absolute 12.62 10*3/mm3      Lymphocytes Absolute 0.60 10*3/mm3      Monocytes Absolute 0.90 10*3/mm3      Eosinophils Absolute 0.45 10*3/mm3      nRBC 1.0 /100 WBC      Polychromasia Slight/1+     Comment: Rare target cell        WBC Morphology Normal     Platelet Estimate Decreased    Comprehensive Metabolic Panel [[id number]]  (Abnormal) Collected:  08/12/19 0618    Specimen:  Blood Updated:  08/12/19 0733     Glucose 132 mg/dL      BUN 21 mg/dL      Creatinine 1.14 mg/dL      Sodium 141 mmol/L      Potassium 3.6 mmol/L      Chloride 106 mmol/L      CO2 23.0 mmol/L      Calcium 8.4 mg/dL      Total Protein 5.2 g/dL      Albumin 3.00 g/dL      ALT (SGPT) 129 U/L      AST (SGOT) 210 U/L      Alkaline Phosphatase 510 U/L      Total Bilirubin 5.2 mg/dL      eGFR Non African Amer 46 mL/min/1.73      Globulin 2.2 gm/dL      A/G Ratio 1.4 g/dL      BUN/Creatinine Ratio 18.4     Anion Gap 12.0 mmol/L     Narrative:       GFR Normal >60  Chronic Kidney Disease <60  Kidney Failure <15    CBC & Differential [[id number]] Collected:  08/12/19 0618    Specimen:  Blood Updated:  08/12/19 0717    Narrative:       The following orders were created for panel order CBC & Differential.  Procedure                                Abnormality         Status                     ---------                               -----------         ------                     Scan Slide[[id number]]                                                                  CBC Auto Differential[[id number]]        Abnormal            Final result                 Please view results for these tests on the individual orders.    CBC Auto Differential [[id number]]  (Abnormal) Collected:  08/12/19 0618    Specimen:  Blood Updated:  08/12/19 0717     WBC 15.02 10*3/mm3      RBC 3.43 10*6/mm3      Hemoglobin 10.1 g/dL      Hematocrit 30.1 %      MCV 87.8 fL      MCH 29.4 pg      MCHC 33.6 g/dL      RDW 20.3 %      RDW-SD 60.3 fl      MPV 12.2 fL      Platelets 102 10*3/mm3     CBC & Differential [[id number]] Collected:  08/11/19 0638    Specimen:  Blood Updated:  08/11/19 0759    Narrative:       The following orders were created for panel order CBC & Differential.  Procedure                               Abnormality         Status                     ---------                               -----------         ------                     Scan Slide[[id number]]                                                                  CBC Auto Differential[[id number]]        Abnormal            Final result                 Please view results for these tests on the individual orders.    Comprehensive Metabolic Panel [[id number]]  (Abnormal) Collected:  08/11/19 0638    Specimen:  Blood Updated:  08/11/19 0740     Glucose 117 mg/dL      BUN 24 mg/dL      Creatinine 1.24 mg/dL      Sodium 145 mmol/L      Potassium 3.5 mmol/L      Chloride 109 mmol/L      CO2 22.0 mmol/L      Calcium 8.0 mg/dL      Total Protein 5.4 g/dL      Albumin 3.10 g/dL      ALT (SGPT) 126 U/L      AST (SGOT) 182 U/L      Alkaline Phosphatase 445 U/L      Total Bilirubin 4.4 mg/dL      eGFR Non African Amer 41 mL/min/1.73      Globulin 2.3 gm/dL      A/G Ratio 1.3 g/dL      BUN/Creatinine Ratio 19.4      Anion Gap 14.0 mmol/L     Narrative:       GFR Normal >60  Chronic Kidney Disease <60  Kidney Failure <15    CBC Auto Differential [[id number]]  (Abnormal) Collected:  08/11/19 0638    Specimen:  Blood Updated:  08/11/19 0725     WBC 14.66 10*3/mm3      RBC 3.41 10*6/mm3      Hemoglobin 10.2 g/dL      Hematocrit 30.8 %      MCV 90.3 fL      MCH 29.9 pg      MCHC 33.1 g/dL      RDW 20.9 %      RDW-SD 63.8 fl      MPV 12.1 fL      Platelets 121 10*3/mm3      Neutrophil % 85.4 %      Lymphocyte % 6.3 %      Monocyte % 4.1 %      Eosinophil % 0.1 %      Basophil % 0.1 %      Immature Grans % 4.0 %      Neutrophils, Absolute 12.52 10*3/mm3      Lymphocytes, Absolute 0.92 10*3/mm3      Monocytes, Absolute 0.60 10*3/mm3      Eosinophils, Absolute 0.02 10*3/mm3      Basophils, Absolute 0.02 10*3/mm3      Immature Grans, Absolute 0.58 10*3/mm3      nRBC 0.5 /100 WBC     Comprehensive Metabolic Panel [[id number]]  (Abnormal) Collected:  08/10/19 0552    Specimen:  Blood Updated:  08/10/19 0645     Glucose 144 mg/dL      BUN 27 mg/dL      Creatinine 1.47 mg/dL      Sodium 142 mmol/L      Potassium 3.7 mmol/L      Chloride 104 mmol/L      CO2 22.0 mmol/L      Calcium 8.1 mg/dL      Total Protein 5.5 g/dL      Albumin 3.20 g/dL      ALT (SGPT) 137 U/L      AST (SGOT) 208 U/L      Alkaline Phosphatase 472 U/L      Total Bilirubin 4.9 mg/dL      eGFR Non African Amer 34 mL/min/1.73      Globulin 2.3 gm/dL      A/G Ratio 1.4 g/dL      BUN/Creatinine Ratio 18.4     Anion Gap 16.0 mmol/L     Narrative:       GFR Normal >60  Chronic Kidney Disease <60  Kidney Failure <15    CBC & Differential [[id number]] Collected:  08/10/19 0552    Specimen:  Blood Updated:  08/10/19 0642    Narrative:       The following orders were created for panel order CBC & Differential.  Procedure                               Abnormality         Status                     ---------                               -----------         ------                      Scan Slide[[id number]]                                                                  CBC Auto Differential[[id number]]        Abnormal            Final result                 Please view results for these tests on the individual orders.    CBC Auto Differential [[id number]]  (Abnormal) Collected:  08/10/19 0552    Specimen:  Blood Updated:  08/10/19 0639     WBC 14.56 10*3/mm3      RBC 3.40 10*6/mm3      Hemoglobin 10.2 g/dL      Hematocrit 30.4 %      MCV 89.4 fL      MCH 30.0 pg      MCHC 33.6 g/dL      RDW 19.8 %      RDW-SD 60.3 fl      MPV 12.1 fL      Platelets 130 10*3/mm3      Neutrophil % 85.8 %      Lymphocyte % 5.7 %      Monocyte % 4.5 %      Eosinophil % 0.2 %      Basophil % 0.1 %      Immature Grans % 3.7 %      Neutrophils, Absolute 12.49 10*3/mm3      Lymphocytes, Absolute 0.83 10*3/mm3      Monocytes, Absolute 0.65 10*3/mm3      Eosinophils, Absolute 0.03 10*3/mm3      Basophils, Absolute 0.02 10*3/mm3      Immature Grans, Absolute 0.54 10*3/mm3      nRBC 0.5 /100 WBC     Urinalysis, Microscopic Only - Urine, Clean Catch [[id number]]  (Abnormal) Collected:  08/09/19 2352    Specimen:  Urine, Clean Catch Updated:  08/10/19 0013     RBC, UA 6-12 /HPF      WBC, UA 6-12 /HPF      Bacteria, UA 1+ /HPF      Squamous Epithelial Cells, UA 0-2 /HPF      Hyaline Casts, UA 3-6 /LPF      Methodology Manual Light Microscopy    Urinalysis With Microscopic If Indicated (No Culture) - Urine, Clean Catch [[id number]]  (Abnormal) Collected:  08/09/19 2352    Specimen:  Urine, Clean Catch Updated:  08/10/19 0003     Color, UA Dark Yellow     Appearance, UA Cloudy     pH, UA 5.5     Specific Gravity, UA 1.019     Glucose, UA Negative     Ketones, UA 15 mg/dL (1+)     Bilirubin, UA Large (3+)     Blood, UA Negative     Protein, UA 30 mg/dL (1+)     Leuk Esterase, UA Small (1+)     Nitrite, UA Negative     Urobilinogen, UA 1.0 E.U./dL    Extra Tubes [[id number]] Collected:  08/09/19 1117    Specimen:  Blood, Venous Line  Updated:  08/09/19 1230    Narrative:       The following orders were created for panel order Extra Tubes.  Procedure                               Abnormality         Status                     ---------                               -----------         ------                     Light Blue Top[[id number]]                                   Final result               Gold Top - SST[[id number]]                                   Final result                 Please view results for these tests on the individual orders.    Light Blue Top [[id number]] Collected:  08/09/19 1117    Specimen:  Blood Updated:  08/09/19 1230     Extra Tube hold for add-on     Comment: Auto resulted       Gold Top - SST [[id number]] Collected:  08/09/19 1117    Specimen:  Blood Updated:  08/09/19 1230     Extra Tube Hold for add-ons.     Comment: Auto resulted.       Comprehensive Metabolic Panel [[id number]]  (Abnormal) Collected:  08/09/19 1114    Specimen:  Blood Updated:  08/09/19 1144     Glucose 119 mg/dL      BUN 31 mg/dL      Creatinine 1.47 mg/dL      Sodium 142 mmol/L      Potassium 3.3 mmol/L      Chloride 100 mmol/L      CO2 22.0 mmol/L      Calcium 8.7 mg/dL      Total Protein 5.9 g/dL      Albumin 3.30 g/dL      ALT (SGPT) 143 U/L      AST (SGOT) 199 U/L      Alkaline Phosphatase 440 U/L      Total Bilirubin 4.8 mg/dL      eGFR Non African Amer 34 mL/min/1.73      Globulin 2.6 gm/dL      A/G Ratio 1.3 g/dL      BUN/Creatinine Ratio 21.1     Anion Gap 20.0 mmol/L     Narrative:       GFR Normal >60  Chronic Kidney Disease <60  Kidney Failure <15    CBC & Differential [[id number]] Collected:  08/09/19 1114    Specimen:  Blood Updated:  08/09/19 1120    Narrative:       The following orders were created for panel order CBC & Differential.  Procedure                               Abnormality         Status                     ---------                               -----------         ------                     CBC Auto  Differential[[id number]]        Abnormal            Final result                 Please view results for these tests on the individual orders.    CBC Auto Differential [[id number]]  (Abnormal) Collected:  08/09/19 1114    Specimen:  Blood Updated:  08/09/19 1120     WBC 15.38 10*3/mm3      RBC 3.59 10*6/mm3      Hemoglobin 10.6 g/dL      Hematocrit 31.8 %      MCV 88.6 fL      MCH 29.5 pg      MCHC 33.3 g/dL      RDW 19.1 %      RDW-SD 58.8 fl      MPV 12.3 fL      Platelets 153 10*3/mm3      Neutrophil % 86.7 %      Lymphocyte % 5.6 %      Monocyte % 4.4 %      Eosinophil % 0.0 %      Basophil % 0.2 %      Immature Grans % 3.1 %      Neutrophils, Absolute 13.33 10*3/mm3      Lymphocytes, Absolute 0.86 10*3/mm3      Monocytes, Absolute 0.68 10*3/mm3      Eosinophils, Absolute 0.00 10*3/mm3      Basophils, Absolute 0.03 10*3/mm3      Immature Grans, Absolute 0.48 10*3/mm3      nRBC 0.5 /100 WBC           HOSPITAL COURSE:  Active Hospital Problems    Diagnosis POA   • **Metastatic cancer (CMS/HCC) [C79.9] Yes   • Malignant neoplasm of lumbar vertebra (CMS/HCC) [C41.2] Yes       #.  Metastatic cancer. Patient with likely metastatic cancer involving bone, adrenal, liver, and lung.   8/12. Home with hospice today.   8/11. Hospice meeting today. Awaiting recommendations.  8/10. Family meeting with oncology today secondary to likely metastatic disease. Will await meeting and recommendations. Pain better controlled.  8/9. Patient with acute compression fracture as well as likely bone metastasis or multiple myeloma.  Have consulted hematology oncology.    #.  Weakness.  This appears to be acute on chronic.  This was exacerbated with patient's recent fall and back pain.  #.  Back pain.    8/12. Pain moderately controlled currently.  8/11. Pain better controlled.  8/10. Pain better controlled today per patient.  8/9. Acute on chronic.  Likely exacerbated by bony mets or multiple myeloma.  Hematology oncology consulted.  Have  discussed the case with orthopedics.  No immediate surgical intervention needed.  Pain medication.  #.  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  8/12. Home with hospice today.   8/11. Hospice meeting today. Awaiting recommendations.  8/10. Patient to have family meeting with oncology today after MRI and CT scan reviewed.  8/9. Hematology oncology consulted.  Patient and family to discuss options.  #.  Elevated LFTs.    8/12. Worse today. Home with hospice today.   8/11. Stable.  8/10. Stable. IVFs. Monitor.  8/9. This is likely due to metastatic disease involving the liver.  Monitor.          Results from last 7 days   Lab Units 08/12/19  0618 08/11/19  0638 08/10/19  0552 08/09/19  1114   AST (SGOT) U/L 210* 182* 208* 199*   ALT (SGPT) U/L 129* 126* 137* 143*             Results from last 7 days   Lab Units 08/12/19  0618 08/11/19  0638 08/10/19  0552   BILIRUBIN mg/dL 5.2* 4.4* 4.9*   #. SEVERINO.   8/12. Stable. Home with hospice today.   8/11. Improving.  8/10. Likely secondary to dehydration. IVF's. Monitor.          Results from last 7 days   Lab Units 08/12/19  0618 08/11/19  0638 08/10/19  0552 08/09/19  1114   CREATININE mg/dL 1.14* 1.24* 1.47* 1.47*       Patient to be discharged home with hospice today to her son's house.  Prescriptions for morphine, Ativan, Dulcolax, and Zofran printed and signed.  Patient to have intoxication meds per hospice provider as needed.      DISCHARGE CONDITION:   Fair.    DISPOSITION:  Hospice/Home    DISCHARGE MEDICATIONS     Discharge Medications      New Medications      Instructions Start Date   bisacodyl 10 MG suppository  Commonly known as:  DULCOLAX   10 mg, Rectal, Daily      LORazepam 2 MG/ML concentrated solution  Commonly known as:  ATIVAN   0.5 mg, Oral, Every 8 Hours PRN      morphine 20 MG/ML concentrated solution   5 mg, Oral, Every 3 Hours PRN      ondansetron ODT 4 MG disintegrating tablet  Commonly known as:  ZOFRAN-ODT   4 mg, Oral,  Every 8 Hours PRN         Stop These Medications    alendronate 70 MG tablet  Commonly known as:  FOSAMAX     ibuprofen 200 MG tablet  Commonly known as:  ADVIL,MOTRIN     traMADol 50 MG tablet  Commonly known as:  ULTRAM            INSTRUCTIONS:  Activity:   Activity Instructions     Activity as Tolerated            Diet:   Diet Instructions     Advance Diet As Tolerated      Home with hospice. Diet as tolerated.          Special instructions: Patient instructed to call MD or return to ED with worsening shortness of breath, chest pain, fever greater than 100.4 degrees F or any other medical concerns..    FOLLOW UP:    Contact information for follow-up providers     Juanis Ivy, APRN .    Specialty:  Family Medicine  Contact information:  [address] [facility] [address]  [phone]                   Contact information for after-discharge care     Destination     [facility] Follow up.    Service:  Inpatient Hospice  Contact information:  [address] [facility] [address] [phone]                             PENDING TEST RESULTS AT DISCHARGE      Time: Discharge 30 min          This document has been electronically signed by Greg Zuniga MD on August 12, 2019 10:09 AM

## 2019-08-12 NOTE — PROGRESS NOTES
MEDICINE DAILY PROGRESS NOTE  NAME: Rebecca Zaragoza  : 1935  MRN: [id number]     LOS: 0 days     PROVIDER OF SERVICE: Greg Zuniga MD    Chief Complaint: Fall. Back pain.    Subjective:   HPI: Rebecca Zaragoza is a 84 y.o. female presents with recent fall on back pain.  Patient also endorses weakness and difficulty ambulating secondary to low back pain.  Patient denies any fever, chills, nausea, vomiting, chest pain, diaphoresis, or dyspnea.  Patient does endorse some acute on chronic low back pain as well as weakness.     In the emergency department patient was taken to the MRI scanner for L-spine evaluation.  Patient was found to have diffuse multiple bony marrow infiltrative lesions of various size which was suggestive for multiple myeloma or metastatic disease.  Patient also with an acute T11 mild compression fracture.  Patient also had a CT pending at time of admission.    Interval History:  History taken from: patient chart family RN  . Patient resting at time of exam. No acute event overnight. Patient home with hospice today. No issues with bowel or bladder per patient and grand daughter who is at bedside.  . Patient with some confusion this morning. She reports better pain control. Plan for hospice evaluation today. Family at bedside. All questions answered.  8/10. Patient feeling some better. Pain better controlled with pain medication. Awaiting son to arrive to discuss case with oncology as a family.    Review of Systems:   Review of Systems   Constitutional: Positive for activity change and fatigue. Negative for appetite change and fever.   Respiratory: Negative for cough and shortness of breath.    Cardiovascular: Negative for chest pain and palpitations.   Gastrointestinal: Negative for abdominal pain and nausea.   Genitourinary: Negative for difficulty urinating and dysuria.   Musculoskeletal: Positive for arthralgias, back pain and gait problem.   Skin: Positive for color  change. Negative for rash.   Neurological: Positive for weakness. Negative for dizziness and headaches.   Psychiatric/Behavioral: Positive for confusion. Negative for agitation and sleep disturbance.       Objective:     Vital Signs  Vitals:    08/11/19 1930 08/12/19 0430 08/12/19 0509 08/12/19 0827   BP: 133/60 136/71  106/54   BP Location: Right arm Right arm  Left arm   Patient Position: Lying Lying  Lying   Pulse: 89 76  79   Resp: 18 18  16   Temp: 97.8 °F (36.6 °C) 98.3 °F (36.8 °C)  97.5 °F (36.4 °C)   TempSrc: Oral Axillary  Oral   SpO2: 95% 97%  96%   Weight:   54.4 kg (119 lb 14.4 oz)    Height:           Physical Exam  Physical Exam   Constitutional: She is oriented to person, place, and time. She appears well-developed and well-nourished. No distress.   HENT:   Head: Normocephalic and atraumatic.   Right Ear: External ear normal.   Left Ear: External ear normal.   Nose: Nose normal.   Eyes: Pupils are equal, round, and reactive to light. Right eye exhibits no discharge. Left eye exhibits no discharge. Scleral icterus is present.   Neck: Neck supple. No thyromegaly present.   Cardiovascular: Normal rate, regular rhythm and normal heart sounds.   Pulmonary/Chest: Effort normal and breath sounds normal. No respiratory distress. She has no wheezes. She has no rales. She exhibits no tenderness.   Abdominal: Soft. Bowel sounds are normal. She exhibits no distension and no mass. There is no tenderness. There is no rebound and no guarding.   Musculoskeletal:   Patient again moves LE's symmetrically, but reduced ROM secondary to pain.   Neurological: She is alert and oriented to person, place, and time.   Skin: Skin is warm and dry. No rash noted. She is not diaphoretic. No erythema. No pallor.   Psychiatric: She has a normal mood and affect. Her behavior is normal.   Nursing note and vitals reviewed.      Medication Review    Current Facility-Administered Medications:   •  diphenhydrAMINE (BENADRYL) 12.5 MG/5ML  elixir 12.5 mg, 12.5 mg, Oral, Nightly PRN, Andrés Hill MD, 12.5 mg at 08/10/19 0008  •  famotidine (PEPCID) tablet 20 mg, 20 mg, Oral, Daily, Greg Zuniga MD, 20 mg at 08/12/19 0831  •  morphine injection 1 mg, 1 mg, Intravenous, Q6H PRN, Greg Zuniga MD, 1 mg at 08/12/19 0457  •  ondansetron (ZOFRAN) injection 4 mg, 4 mg, Intravenous, Q6H PRN, Greg Zuniga MD  •  [COMPLETED] Insert peripheral IV, , , Once **AND** sodium chloride 0.9 % flush 10 mL, 10 mL, Intravenous, PRN, James Pathak PA-C  •  sodium chloride 0.9 % flush 3 mL, 3 mL, Intravenous, Q12H, Greg Zuniga MD, 3 mL at 08/12/19 0831  •  sodium chloride 0.9 % flush 3-10 mL, 3-10 mL, Intravenous, PRN, Greg Zuniga MD     Diagnostic Data    Lab Results (last 24 hours)     Procedure Component Value Units Date/Time    Comprehensive Metabolic Panel [[id number]]  (Abnormal) Collected:  08/12/19 0618    Specimen:  Blood Updated:  08/12/19 0733     Glucose 132 mg/dL      BUN 21 mg/dL      Creatinine 1.14 mg/dL      Sodium 141 mmol/L      Potassium 3.6 mmol/L      Chloride 106 mmol/L      CO2 23.0 mmol/L      Calcium 8.4 mg/dL      Total Protein 5.2 g/dL      Albumin 3.00 g/dL      ALT (SGPT) 129 U/L      AST (SGOT) 210 U/L      Alkaline Phosphatase 510 U/L      Total Bilirubin 5.2 mg/dL      eGFR Non African Amer 46 mL/min/1.73      Globulin 2.2 gm/dL      A/G Ratio 1.4 g/dL      BUN/Creatinine Ratio 18.4     Anion Gap 12.0 mmol/L     Narrative:       GFR Normal >60  Chronic Kidney Disease <60  Kidney Failure <15    CBC & Differential [[id number]] Collected:  08/12/19 0618    Specimen:  Blood Updated:  08/12/19 0717    Narrative:       The following orders were created for panel order CBC & Differential.  Procedure                               Abnormality         Status                     ---------                               -----------         ------                     Scan Slide[[id number]]                                                                   CBC Auto Differential[[id number]]        Abnormal            Final result                 Please view results for these tests on the individual orders.    CBC Auto Differential [[id number]]  (Abnormal) Collected:  08/12/19 0618    Specimen:  Blood Updated:  08/12/19 0717     WBC 15.02 10*3/mm3      RBC 3.43 10*6/mm3      Hemoglobin 10.1 g/dL      Hematocrit 30.1 %      MCV 87.8 fL      MCH 29.4 pg      MCHC 33.6 g/dL      RDW 20.3 %      RDW-SD 60.3 fl      MPV 12.2 fL      Platelets 102 10*3/mm3     Manual Differential [[id number]] Collected:  08/12/19 0618    Specimen:  Blood Updated:  08/12/19 0717          I reviewed the patient's new clinical results.    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  Metastatic cancer. Patient with likely metastatic cancer involving bone, adrenal, liver, and lung.   8/12. Home with hospice today.   8/11. Hospice meeting today. Awaiting recommendations.  8/10. Family meeting with oncology today secondary to likely metastatic disease. Will await meeting and recommendations. Pain better controlled.  8/9. Patient with acute compression fracture as well as likely bone metastasis or multiple myeloma.  Have consulted hematology oncology.    #.  Weakness.  This appears to be acute on chronic.  This was exacerbated with patient's recent fall and back pain.  #.  Back pain.    8/12. Pain moderately controlled currently.  8/11. Pain better controlled.  8/10. Pain better controlled today per patient.  8/9. Acute on chronic.  Likely exacerbated by bony mets or multiple myeloma.  Hematology oncology consulted.  Have discussed the case with orthopedics.  No immediate surgical intervention needed.  Pain medication.  #.  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  8/12. Home with hospice today.   8/11. Hospice meeting  today. Awaiting recommendations.  8/10. Patient to have family meeting with oncology today after MRI and CT scan reviewed.  8/9. Hematology oncology consulted.  Patient and family to discuss options.  #.  Elevated LFTs.    8/12. Worse today. Home with hospice today.   8/11. Stable.  8/10. Stable. IVFs. Monitor.  8/9. This is likely due to metastatic disease involving the liver.  Monitor.  Results from last 7 days   Lab Units 08/12/19  0618 08/11/19  0638 08/10/19  0552 08/09/19  1114   AST (SGOT) U/L 210* 182* 208* 199*   ALT (SGPT) U/L 129* 126* 137* 143*     Results from last 7 days   Lab Units 08/12/19  0618 08/11/19  0638 08/10/19  0552   BILIRUBIN mg/dL 5.2* 4.4* 4.9*   #. SEVERINO.   8/12. Stable. Home with hospice today.   8/11. Improving.  8/10. Likely secondary to dehydration. IVF's. Monitor.  Results from last 7 days   Lab Units 08/12/19  0618 08/11/19  0638 08/10/19  0552 08/09/19  1114   CREATININE mg/dL 1.14* 1.24* 1.47* 1.47*       Will monitor patient's hospital course and adjust treatment as hospital course dictates.    DVT prophylaxis: SCDs  Code status is   Code Status and Medical Interventions:   Ordered at: 08/09/19 1524     Limited Support to NOT Include:    Intubation    Artificial Nutrition     Code Status:    No CPR     Medical Interventions (Level of Support Prior to Arrest):    Limited       Plan for disposition:Planning ongoing.      Time:           This document has been electronically signed by Greg Zuniga MD on August 12, 2019 8:40 AM

## 2019-08-12 NOTE — PLAN OF CARE
Problem: Patient Care Overview  Goal: Plan of Care Review  Outcome: Ongoing (interventions implemented as appropriate)   08/12/19 0335   Coping/Psychosocial   Plan of Care Reviewed With patient   Plan of Care Review   Progress no change   OTHER   Outcome Summary Patient currently resting with no complaints. VSS. Will continue to monitor      Goal: Individualization and Mutuality  Outcome: Ongoing (interventions implemented as appropriate)    Goal: Discharge Needs Assessment  Outcome: Ongoing (interventions implemented as appropriate)    Goal: Interprofessional Rounds/Family Conf  Outcome: Ongoing (interventions implemented as appropriate)      Problem: Fall Risk (Adult)  Goal: Identify Related Risk Factors and Signs and Symptoms  Outcome: Ongoing (interventions implemented as appropriate)      Problem: Skin Injury Risk (Adult)  Goal: Skin Health and Integrity  Outcome: Ongoing (interventions implemented as appropriate)      Problem: Pain, Chronic (Adult)  Goal: Acceptable Pain/Comfort Level and Functional Ability  Outcome: Ongoing (interventions implemented as appropriate)      Problem: Oncology Care (Adult)  Goal: Signs and Symptoms of Listed Potential Problems Will be Absent, Minimized or Managed (Oncology Care)  Outcome: Ongoing (interventions implemented as appropriate)

## 2019-08-12 NOTE — NURSING NOTE
Pt declined to eat food from meal trays today. Pt did drink Boost with encouragement. Pt expressed concern that her son and daughter did not agree on plan for her discharge. Pt says she preferred to go to her son's home.

## 2020-12-22 NOTE — PROGRESS NOTES
"Postop Follow-up    Name:  Rebecca Zaragoza  Date:  2018  :  1935    Chief Complaint:    Chief Complaint   Patient presents with   • Right Ankle - Follow-up   • Cast Removal     x-rays      Date of surgery:    17 (53d) Marco A Ibrahim MD     OPEN REDUCTION INTERNAL FIXATION RIGHT ANKLE       (C-ARM#3) - Right       X- rays done today in office     History of Present Illness: follow up right ankle 2/10   States her pain is minimal.  She is at the [facility] for rehabilitation.      Current Outpatient Prescriptions:   •  HYDROcodone-acetaminophen (NORCO) 7.5-325 MG per tablet, Take 1 tablet by mouth Every 6 (Six) Hours As Needed for Moderate Pain ., Disp: 80 tablet, Rfl: 0    Allergies   Allergen Reactions   • Contrast Dye      Pt states \" I had red dye here and broke out in rash.\"         Exam:  Vitals:    18 1523   Weight: 61.2 kg (135 lb)   Height: 165.1 cm (65\")       The patient is awake, alert, and oriented and in no apparent distress.    Right upper extremity:    Left upper extremity:    Right lower extremity:    Left lower extremity: incision is healed 80%, no erythema, no drainage, no swelling no mass  Plantarflexion 20   -10 dorsiflexion right ankle.          Xr Ankle 3+ View Right    Result Date: 2018  Narrative: Xray of the left ankle with standard projection AP and lateral view bone quality is decreased, normal alignment, unhealed fibula. Fracture appliance in position.          Assessment:  Diagnoses and all orders for this visit:    Closed bimalleolar fracture of right ankle with routine healing, subsequent encounter          Plan:    Partial weight bearing of the right ankle.  Slow progression.      No Follow-up on file.      18 at 3:25 PM by Marco A Ibrahim MD  " 22-Dec-2020 23:16

## (undated) DEVICE — PK EXTRM LF 60

## (undated) DEVICE — GLV SURG SENSICARE GREEN W/ALOE PF LF 9 STRL

## (undated) DEVICE — DRSNG GZ CURAD XEROFORM NONADHS 5X9IN STRL

## (undated) DEVICE — DRSNG GZ CURAD XEROFORM NONADHR OVERWRAP 5X9IN

## (undated) DEVICE — GLV SURG SENSICARE ALOE LF PF SZ7.5 GRN

## (undated) DEVICE — GW THRD 1.25X150MM FOR 3.5 4MM CANN SCRW

## (undated) DEVICE — SUT VIC 3/0 SH 27IN J416H

## (undated) DEVICE — CVR SURG EQUIP BND RECTG 36X28

## (undated) DEVICE — PAD UNDERCAST WYTEX 6IN 4YD LF STRL

## (undated) DEVICE — Device

## (undated) DEVICE — SPLNT ORTHOGLASS P/C 34X30IN LF

## (undated) DEVICE — DRAPE,U/ SHT,SPLIT,PLAS,STERIL: Brand: MEDLINE

## (undated) DEVICE — CAUTERY TIP POLISHER: Brand: DEVON

## (undated) DEVICE — DISPOSABLE TOURNIQUET CUFF SINGLE BLADDER, DUAL PORT AND QUICK CONNECT CONNECTOR: Brand: COLOR CUFF

## (undated) DEVICE — PAD UNDERCAST WYTEX 4IN 4YD LF STRL

## (undated) DEVICE — BNDG ELAS ELITE V/CLOSE 6IN 5YD LF STRL

## (undated) DEVICE — GLV SURG SENSICARE MICRO PF LF 7.5 STRL

## (undated) DEVICE — SUT ETHLN 3-0 FS118IN 663H

## (undated) DEVICE — BNDG ELAS CO-FLEX SLF ADHR 4IN5YD LF STRL

## (undated) DEVICE — SUT VIC 2 CP 27IN UD VCP195H

## (undated) DEVICE — SUT VICRYL 2-0  X-1 27IN ETVCP459H PLS

## (undated) DEVICE — UNDRPD BREATH 23X36 BG/10

## (undated) DEVICE — APPL CHLORAPREP W/TINT 26ML ORNG

## (undated) DEVICE — GLV SURG SENSICARE GREEN W/ALOE PF LF 7 STRL

## (undated) DEVICE — SPNG GZ WOVN 4X4IN 12PLY 10/BX STRL

## (undated) DEVICE — GLV SURG SENSICARE GREEN W/ALOE PF LF 8 STRL

## (undated) DEVICE — BNDG ELAS ELITE V/CLOSE 4IN 5YD LF STRL

## (undated) DEVICE — SOL IRR NACL 0.9PCT BT 1000ML

## (undated) DEVICE — GLV SURG TRIUMPH ORTHO W/ALOE PF LTX 7.0

## (undated) DEVICE — SUT ETHLN 2/0 FS 18IN 664H

## (undated) DEVICE — CVR FLUOROSCOPE C/ARM W/TP 36X28IN

## (undated) DEVICE — TOWEL,OR,DSP,ST,BLUE,DLX,8/PK,10PK/CS: Brand: MEDLINE